# Patient Record
Sex: MALE | Race: WHITE | Employment: FULL TIME | URBAN - NONMETROPOLITAN AREA
[De-identification: names, ages, dates, MRNs, and addresses within clinical notes are randomized per-mention and may not be internally consistent; named-entity substitution may affect disease eponyms.]

---

## 2020-07-07 ENCOUNTER — OFFICE VISIT (OUTPATIENT)
Dept: INTERNAL MEDICINE CLINIC | Age: 30
End: 2020-07-07
Payer: MEDICAID

## 2020-07-07 VITALS
SYSTOLIC BLOOD PRESSURE: 128 MMHG | DIASTOLIC BLOOD PRESSURE: 69 MMHG | HEIGHT: 72 IN | BODY MASS INDEX: 21.81 KG/M2 | HEART RATE: 66 BPM | TEMPERATURE: 98.1 F | WEIGHT: 161 LBS

## 2020-07-07 LAB
AMPHETAMINE SCREEN, URINE: ABNORMAL
BARBITURATE SCREEN, URINE: ABNORMAL
BENZODIAZEPINE SCREEN, URINE: ABNORMAL
BUPRENORPHINE URINE: ABNORMAL
COCAINE METABOLITE SCREEN URINE: ABNORMAL
GABAPENTIN SCREEN, URINE: ABNORMAL
MDMA URINE: ABNORMAL
METHADONE SCREEN, URINE: ABNORMAL
METHAMPHETAMINE, URINE: ABNORMAL
OPIATE SCREEN URINE: ABNORMAL
OXYCODONE SCREEN URINE: ABNORMAL
PHENCYCLIDINE SCREEN URINE: ABNORMAL
PROPOXYPHENE SCREEN, URINE: ABNORMAL
THC SCREEN, URINE: ABNORMAL
TRICYCLIC ANTIDEPRESSANTS, UR: ABNORMAL

## 2020-07-07 PROCEDURE — 99204 OFFICE O/P NEW MOD 45 MIN: CPT | Performed by: INTERNAL MEDICINE

## 2020-07-07 PROCEDURE — G8420 CALC BMI NORM PARAMETERS: HCPCS | Performed by: INTERNAL MEDICINE

## 2020-07-07 PROCEDURE — 4004F PT TOBACCO SCREEN RCVD TLK: CPT | Performed by: INTERNAL MEDICINE

## 2020-07-07 PROCEDURE — 80305 DRUG TEST PRSMV DIR OPT OBS: CPT | Performed by: INTERNAL MEDICINE

## 2020-07-07 PROCEDURE — G8428 CUR MEDS NOT DOCUMENT: HCPCS | Performed by: INTERNAL MEDICINE

## 2020-07-07 NOTE — PROGRESS NOTES
MEDICATION ASSISTED TREATMENT ENCOUNTER    HISTORY OF PRESENT ILLNESS  Patient presents for evaluation of opioid use and would like to be placed on medication assisted treatment  She is referred here by his sister who comes here and sees me  Patient has had problems using heroin/fentanyl, pain pills  Patient started using [id number] years ago  He said his brother had  of a heroin overdose  Patient uses it snorting  Other drugs used: THC  Suboxone programs in the past no  Vivitrol in the past no  Last use of heroin 2 to 3 days ago  Patient would typically use $40-$50 worth of heroin/fentanyl daily  Ever used Suboxone off the street yes he has been using Suboxone off the street the past couple of days    No past medical history on file. No past surgical history on file. PAST PSYCHIATRIC HISTORY: Depression    No Known Allergies    Current Outpatient Medications   Medication Sig Dispense Refill    buprenorphine-naloxone (SUBOXONE) 12-3 MG sublingual film Place 1 Film under the tongue daily for 7 days. 7 Film 0     No current facility-administered medications for this visit.           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     Support system wife, sister, mom and dad     Legal issues Counts include 234 beds at the [facility] prison a handful of times     Tobacco: yes, cigarettes     Alcohol no                 ROS     General: Patient denies fevers, chills ,weight changes, sweats     Psych: No depression, anxiety, suicidal ideation or attempts     Endocrine: No thyroid issues,no neck pain, no galactorrhea, no weight changes     Pulmonary: No shortness of breath, orthopnea, PND     Cardiac: No chest pain,syncope, no history of cardiac issues     GI: nervous stomach     : No dysuria, an opioid antagonist that reverses respiratory depression caused by opioids. Pharmacy will give patient or family member [facility] and explain how to use in an emergency.   I reviewed the [place] Automated Rx Reporting System report     There does not appear to be any discrepancies or overprescribing of controlled substances    We gave him 112 mg Suboxone strip out of stock  I told him he has to wait at least 24 hours after last fentanyl use  Follow-up here tomorrow

## 2020-07-07 NOTE — PROGRESS NOTES
Verbal order per Dr. Eveline Schneider for urine drug screen. Positive for BUP BAR THC Fentanyl . Verified results with Ivana Gee RN. Dr. Eveline Schneider ordered Suboxone 12mg film qty 1 out of stock for patient. Verified dose with patient. Patient was sent home with Suboxone 12mg film qty 1 out of stock and will be seen back in the office 7/8/20.

## 2020-07-08 ENCOUNTER — OFFICE VISIT (OUTPATIENT)
Dept: INTERNAL MEDICINE CLINIC | Age: 30
End: 2020-07-08
Payer: MEDICAID

## 2020-07-08 VITALS
BODY MASS INDEX: 22.48 KG/M2 | HEART RATE: 66 BPM | HEIGHT: 72 IN | DIASTOLIC BLOOD PRESSURE: 74 MMHG | SYSTOLIC BLOOD PRESSURE: 128 MMHG | TEMPERATURE: 98.1 F | WEIGHT: 166 LBS

## 2020-07-08 PROCEDURE — 99214 OFFICE O/P EST MOD 30 MIN: CPT | Performed by: INTERNAL MEDICINE

## 2020-07-08 PROCEDURE — 4004F PT TOBACCO SCREEN RCVD TLK: CPT | Performed by: INTERNAL MEDICINE

## 2020-07-08 PROCEDURE — G8427 DOCREV CUR MEDS BY ELIG CLIN: HCPCS | Performed by: INTERNAL MEDICINE

## 2020-07-08 PROCEDURE — G8420 CALC BMI NORM PARAMETERS: HCPCS | Performed by: INTERNAL MEDICINE

## 2020-07-08 PROCEDURE — 80305 DRUG TEST PRSMV DIR OPT OBS: CPT | Performed by: INTERNAL MEDICINE

## 2020-07-08 RX ORDER — BUPRENORPHINE AND NALOXONE 12; 3 MG/1; MG/1
1 FILM, SOLUBLE BUCCAL; SUBLINGUAL DAILY
Qty: 2 FILM | Refills: 0 | Status: CANCELLED | OUTPATIENT
Start: 2020-07-08 | End: 2020-07-10

## 2020-07-08 RX ORDER — BUPRENORPHINE AND NALOXONE 12; 3 MG/1; MG/1
1 FILM, SOLUBLE BUCCAL; SUBLINGUAL DAILY
COMMUNITY
End: 2020-07-09 | Stop reason: SDUPTHER

## 2020-07-08 NOTE — PROGRESS NOTES
Clinician engaged with pt and reviewed OBOT clinic expectations for program participants. Clinician explained that upon starting with the program the patient will be expected to engage in individual and group counseling to enhance their recovery skills. Clinician reviewed the treatment menu with patient and identified local providers that they are comfortable seeing for counseling services. Provided pt with information about accessing services at their agency of choice. Clinician explained that upon completing their first counseling visit they need to bring a copy of a signed MICHELINE that allows the counselor to release attendance records to the clinic and a copy of their treatment plan. Clinician explained that they need to bring a signed form verifying their attendance after each session with their counselor so that it can be uploaded into their chart. It was explained to the pt that they need to attend either treatment groups at a local agency where they receive counseling or attend a minimum of two community support groups per week and would need to bring a signed verification form. Pt was provided with a list of local 12 step meetings and the attendance verification form. Clinician explained the drug screen policy and informed them that their provider may request that they come in for a random drug screen or medication count. In the event that they are called for a random check, they will have 24hrs to come into the office. In the event that pt is not compliant with program expectations the frequency of their visits may be increased for added accountability, they may be asked to participate in a higher level of care, or they may be terminated from the treatment program.     Clinician allowed time for pt questions and had them sign the program expectations form and clinician signed as the witness.  A copy was made for patient to present to the counselor that they schedule with so that the pt can get the necessary documentation for the clinic. The form was given to support staff to be scanned into the pt chart. Pt would prefer counseling options in the [facility] area and was provided with multiple providers locally that accept his insurance and encouraged to review the options and notify staff at next appointment which agency he has chosen to engage with. Clinician also provided pt with a comprehensive list of virtual and in-person community support groups.

## 2020-07-08 NOTE — PROGRESS NOTES
nocturia, urgency, frequency     MS: legs ache     Neuro: Patient denies headaches, seizures, tremors     Skin: No skin lesions, rashes    PHYSICAL EXAM   Blood pressure 128/74, pulse 66, temperature 98.1 °F (36.7 °C), height 6' (1.829 m), weight 166 lb (75.3 kg). General: Patient resting comfortably in no acute distress     Mental status: Alert and oriented to person place and time, no hallucinations or delusions     Eyes: Pupils are normal        Neurologic: No focal neurologic deficit detected, no tremors        Skin: No rashes, lesions or abnormalities noted        URINE DRUG SCREEN TODAY:  Recent Labs     07/09/20  1625   LABAMPH NEG   LABBARB NEG   LABBENZ NEG   BUPRENUR POS   COCAIMETSCRU NEG   GABAPENTIN N/A   MDMA NEG   METAMPU NEG   LABMETH NEG   OPIATESCREENURINE NEG   OXTCOSU NEG   PHENCYCLIDINESCREENURINE NEG   PROPOXYPHENE N/A   THCSCREENUR POS   TRICYUR N/A           Diagnosis Orders   1. Severe opioid use disorder (HCC)  POCT Rapid Drug Screen   2. Polysubstance abuse [facility])           PLAN:  Provider provided education on Medication Assisted Treatment options, including: Suboxone, Sublocade, Methadone, and Naltrexone/Vivitrol  Allowed opportunity to respond to questions regarding the treatment options. Patient voices that their treatment preference is suboxone. I feel that this patient is an appropriate candidate for this treatment option. Education given on the importance of combining Medication Assisted Treatment with comprehensive treatment, including: individual counseling, treatment groups, community support groups, and psychiatry as applicable. Patient will meet with  to review clinic counseling expectations and to be linked to appropriate services. Provider reviewed medication contract with patient. Patient is agreeable to the program expectations. Both patient and provider signed the medication contract.      Patient instructed to go to [facility] to watch a video and learn about [place]. I told patient [place] is an opioid antagonist that reverses respiratory depression caused by opioids. Pharmacy will give patient or family member [place] and explain how to use in an emergency.   I reviewed the [place] Automated Rx Reporting System report     There does not appear to be any discrepancies or overprescribing of controlled substances  I sent him home with 2- 12 mg strips Suboxone  Follow-up here tomorrow

## 2020-07-08 NOTE — PROGRESS NOTES
Verbal order per Dr. Olga Castillo for urine drug screen. Positive for BAR BUP THC. Verified results with Yani Naqvi RN. Dr. Olga Castillo ordered Suboxone 12mg film qty 2 out of stock for patient. Verified dose with patient. Patient was sent home with Suboxone 12mg film qty 2 out of stock and will be seen back in the office 7/9/20.

## 2020-07-09 ENCOUNTER — OFFICE VISIT (OUTPATIENT)
Dept: INTERNAL MEDICINE CLINIC | Age: 30
End: 2020-07-09
Payer: MEDICAID

## 2020-07-09 VITALS
BODY MASS INDEX: 22.08 KG/M2 | DIASTOLIC BLOOD PRESSURE: 71 MMHG | TEMPERATURE: 98.2 F | HEIGHT: 72 IN | SYSTOLIC BLOOD PRESSURE: 134 MMHG | HEART RATE: 63 BPM | WEIGHT: 163 LBS

## 2020-07-09 PROCEDURE — G8427 DOCREV CUR MEDS BY ELIG CLIN: HCPCS | Performed by: INTERNAL MEDICINE

## 2020-07-09 PROCEDURE — 80305 DRUG TEST PRSMV DIR OPT OBS: CPT | Performed by: INTERNAL MEDICINE

## 2020-07-09 PROCEDURE — G8420 CALC BMI NORM PARAMETERS: HCPCS | Performed by: INTERNAL MEDICINE

## 2020-07-09 PROCEDURE — 99213 OFFICE O/P EST LOW 20 MIN: CPT | Performed by: INTERNAL MEDICINE

## 2020-07-09 PROCEDURE — 4004F PT TOBACCO SCREEN RCVD TLK: CPT | Performed by: INTERNAL MEDICINE

## 2020-07-09 RX ORDER — BUPRENORPHINE AND NALOXONE 12; 3 MG/1; MG/1
1 FILM, SOLUBLE BUCCAL; SUBLINGUAL DAILY
Qty: 5 FILM | Refills: 0 | Status: SHIPPED | OUTPATIENT
Start: 2020-07-09 | End: 2020-07-14 | Stop reason: SDUPTHER

## 2020-07-09 NOTE — PROGRESS NOTES
MEDICATION ASSISTED TREATMENT ENCOUNTER    HISTORY OF PRESENT ILLNESS  Patient presents for evaluation of opioid use and would like to be placed on medication assisted treatment  he is referred here by his sister who comes here and sees me  I saw him here   Patient has had problems using heroin/fentanyl, pain pills  Patient started using [id number] years ago  He said his brother had  of a heroin overdose  Patient uses it snorting  Other drugs used: THC  Suboxone programs in the past no  Vivitrol in the past no  Last use of heroin 2 to 3 days ago  Patient would typically use $40-$50 worth of heroin/fentanyl daily  Ever used Suboxone off the street yes he has been using Suboxone off the street the past couple of days          ROS     General:   Feels much better on Suboxone no withdrawal symptoms    PHYSICAL EXAM   Blood pressure 134/71, pulse 63, temperature 98.2 °F (36.8 °C), height 6' (1.829 m), weight 163 lb (73.9 kg).          General: Patient resting comfortably in no acute distress     Mental status: Alert and oriented to person place and time, no hallucinations or delusions     Eyes: Pupils are normal           URINE DRUG SCREEN TODAY:  Amphetamine Screen, Urine  2020  4:25 PM  Unknown    NEG    Barbiturate Screen, Urine  2020  4:25 PM  Unknown    NEG    Benzodiazepine Screen, Urine  2020  4:25 PM  Unknown    NEG    Buprenorphine Urine  2020  4:25 PM  Unknown    POS    Cocaine Metabolite Screen, Urine  2020  4:25 PM  Unknown    NEG    Gabapentin Screen, Urine  2020  4:25 PM  Unknown    N/A    MDMA, Urine  2020  4:25 PM  Unknown    NEG    Methamphetamine, Urine  2020  4:25 PM  Unknown    NEG    Methadone Screen, Urine  2020  4:25 PM  Unknown    NEG    Opiate Scrn, Ur  2020  4:25 PM  Unknown    NEG    Oxycodone Screen, Ur  2020  4:25 PM  Unknown    NEG    PCP Screen, Urine

## 2020-07-14 ENCOUNTER — OFFICE VISIT (OUTPATIENT)
Dept: INTERNAL MEDICINE CLINIC | Age: 30
End: 2020-07-14
Payer: MEDICAID

## 2020-07-14 VITALS
TEMPERATURE: 97.7 F | BODY MASS INDEX: 22.48 KG/M2 | HEART RATE: 62 BPM | SYSTOLIC BLOOD PRESSURE: 129 MMHG | WEIGHT: 166 LBS | DIASTOLIC BLOOD PRESSURE: 80 MMHG | HEIGHT: 72 IN

## 2020-07-14 PROCEDURE — 80305 DRUG TEST PRSMV DIR OPT OBS: CPT | Performed by: INTERNAL MEDICINE

## 2020-07-14 PROCEDURE — 4004F PT TOBACCO SCREEN RCVD TLK: CPT | Performed by: INTERNAL MEDICINE

## 2020-07-14 PROCEDURE — G8420 CALC BMI NORM PARAMETERS: HCPCS | Performed by: INTERNAL MEDICINE

## 2020-07-14 PROCEDURE — G8427 DOCREV CUR MEDS BY ELIG CLIN: HCPCS | Performed by: INTERNAL MEDICINE

## 2020-07-14 PROCEDURE — 99213 OFFICE O/P EST LOW 20 MIN: CPT | Performed by: INTERNAL MEDICINE

## 2020-07-14 RX ORDER — BUPRENORPHINE AND NALOXONE 12; 3 MG/1; MG/1
1 FILM, SOLUBLE BUCCAL; SUBLINGUAL DAILY
Qty: 7 FILM | Refills: 0 | Status: SHIPPED | OUTPATIENT
Start: 2020-07-14 | End: 2020-07-21 | Stop reason: SDUPTHER

## 2020-07-14 NOTE — PROGRESS NOTES
Verbal order per Dr. Paola Morillo for urine drug screen. Positive for BUP THC. Verified results with Georges Peraza RN. Dr. Paola Morillo ordered Suboxone 12mg film daily for patient. Verified dose with patient. Patient was sent home with 1 week script of Suboxone 12mg film daily and will be seen back in the office 7/21/20.

## 2020-07-14 NOTE — PROGRESS NOTES
MEDICATION ASSISTED TREATMENT ENCOUNTER    HISTORY OF PRESENT ILLNESS  Patient presents for evaluation of opioid use and would like to be placed on medication assisted treatment  he is referred here by his sister who comes here and sees me  I saw him here   Patient has had problems using heroin/fentanyl, pain pills  Patient started using [id number] years ago  He said his brother had  of a heroin overdose  Patient uses it snorting  We started Suboxone last week  I gave him 12 mg films over the weekend  He said 1 day he had to use an extra half of a strip          ROS     General:   Feels much better on Suboxone no withdrawal symptoms    PHYSICAL EXAM   Blood pressure 129/80, pulse 62, temperature 97.7 °F (36.5 °C), height 6' (1.829 m), weight 166 lb (75.3 kg).          General: Patient resting comfortably in no acute distress     Mental status: Alert and oriented to person place and time, no hallucinations or delusions     Eyes: Pupils are normal           URINE DRUG SCREEN TODAY:  Amphetamine Screen, Urine  2020 10:15 AM  Unknown    NEG    Barbiturate Screen, Urine  2020 10:15 AM  Unknown    NEG    Benzodiazepine Screen, Urine  2020 10:15 AM  Unknown    NEG    Buprenorphine Urine  2020 10:15 AM  Unknown    POS    Cocaine Metabolite Screen, Urine  2020 10:15 AM  Unknown    NEG    Gabapentin Screen, Urine  2020 10:15 AM  Unknown    N/A    MDMA, Urine  2020 10:15 AM  Unknown    NEG    Methamphetamine, Urine  2020 10:15 AM  Unknown    NEG    Methadone Screen, Urine  2020 10:15 AM  Unknown    NEG    Opiate Scrn, Ur  2020 10:15 AM  Unknown    NEG    Oxycodone Screen, Ur  2020 10:15 AM  Unknown    NEG    PCP Screen, Urine  2020 10:15 AM  Unknown    NEG    Propoxyphene Screen, Urine  2020 10:15 AM  Unknown    N/A    THC Screen, Urine  2020 10:15 AM  Unknown    POS Tricyclic Antidepressants, Urine  07/14/2020 10:15 AM  Unknown    N/A         Diagnosis Orders   1. Severe opioid use disorder (HCC)  POCT Rapid Drug Screen    buprenorphine-naloxone (SUBOXONE) 12-3 MG sublingual film    Hepatitis C Antibody   2. Polysubstance abuse ([facility] [address]     3.  Encounter for monitoring Suboxone maintenance therapy           PLAN:  Patient says he is doing well on this dose  He is having no withdrawal no cravings no side effects from the Suboxone plan will be to continue him on 12 mg films daily  I am going to see him back early next week Gloria Johnston is working on setting up counseling for the patient

## 2020-07-21 ENCOUNTER — OFFICE VISIT (OUTPATIENT)
Dept: INTERNAL MEDICINE CLINIC | Age: 30
End: 2020-07-21
Payer: MEDICAID

## 2020-07-21 VITALS
HEIGHT: 72 IN | WEIGHT: 169.2 LBS | DIASTOLIC BLOOD PRESSURE: 60 MMHG | TEMPERATURE: 98.6 F | BODY MASS INDEX: 22.92 KG/M2 | SYSTOLIC BLOOD PRESSURE: 129 MMHG | HEART RATE: 65 BPM

## 2020-07-21 PROCEDURE — G8427 DOCREV CUR MEDS BY ELIG CLIN: HCPCS | Performed by: INTERNAL MEDICINE

## 2020-07-21 PROCEDURE — G8420 CALC BMI NORM PARAMETERS: HCPCS | Performed by: INTERNAL MEDICINE

## 2020-07-21 PROCEDURE — 80305 DRUG TEST PRSMV DIR OPT OBS: CPT | Performed by: INTERNAL MEDICINE

## 2020-07-21 PROCEDURE — 4004F PT TOBACCO SCREEN RCVD TLK: CPT | Performed by: INTERNAL MEDICINE

## 2020-07-21 PROCEDURE — 99213 OFFICE O/P EST LOW 20 MIN: CPT | Performed by: INTERNAL MEDICINE

## 2020-07-21 RX ORDER — BUPRENORPHINE AND NALOXONE 12; 3 MG/1; MG/1
1 FILM, SOLUBLE BUCCAL; SUBLINGUAL DAILY
Qty: 7 FILM | Refills: 0 | Status: SHIPPED | OUTPATIENT
Start: 2020-07-21 | End: 2020-07-28 | Stop reason: SDUPTHER

## 2020-07-21 ASSESSMENT — PATIENT HEALTH QUESTIONNAIRE - PHQ9
2. FEELING DOWN, DEPRESSED OR HOPELESS: 0
SUM OF ALL RESPONSES TO PHQ9 QUESTIONS 1 & 2: 0
SUM OF ALL RESPONSES TO PHQ QUESTIONS 1-9: 0
1. LITTLE INTEREST OR PLEASURE IN DOING THINGS: 0
SUM OF ALL RESPONSES TO PHQ QUESTIONS 1-9: 0

## 2020-07-21 NOTE — PROGRESS NOTES
Verbal order per Dr. Silvana Irizarry for urine drug screen. Positive for THC,BUP. Verified results with Tatiana Paulino.

## 2020-07-21 NOTE — PROGRESS NOTES
MEDICATION ASSISTED TREATMENT ENCOUNTER    HISTORY OF PRESENT ILLNESS  Patient presents for evaluation of opioid use and would like to be placed on medication assisted treatment  he is referred here by his sister who comes here and sees me  I saw him here   Patient has had problems using heroin/fentanyl, pain pills  Patient started using [id number] years ago  He said his brother had  of a heroin overdose  Patient uses it snorting  We started Suboxone last week  I gave him 12 mg films over the weekend  He said 1 day he had to use an extra half of a strip          ROS     General:   Feels much better on Suboxone no withdrawal symptoms  No urges or cravings  PHYSICAL EXAM   Blood pressure 129/60, pulse 65, temperature 98.6 °F (37 °C), temperature source Temporal, height 6' (1.829 m), weight 169 lb 3.2 oz (76.7 kg).          General: Patient resting comfortably in no acute distress     Mental status: Alert and oriented to person place and time, no hallucinations or delusions     Eyes: Pupils are normal           URINE DRUG SCREEN TODAY:  Amphetamine Screen, Urine  2020  1:34 PM  Unknown    NEG    Barbiturate Screen, Urine  2020  1:34 PM  Unknown    NEG    Benzodiazepine Screen, Urine  2020  1:34 PM  Unknown    NEG    Buprenorphine Urine  2020  1:34 PM  Unknown    POS    Cocaine Metabolite Screen, Urine  2020  1:34 PM  Unknown    NEG    Gabapentin Screen, Urine  2020  1:34 PM  Unknown    N/A    MDMA, Urine  2020  1:34 PM  Unknown    NEG    Methamphetamine, Urine  2020  1:34 PM  Unknown    NEG    Methadone Screen, Urine  2020  1:34 PM  Unknown    NEG    Opiate Scrn, Ur  2020  1:34 PM  Unknown    NEG    Oxycodone Screen, Ur  2020  1:34 PM  Unknown    NEG    PCP Screen, Urine  2020  1:34 PM  Unknown    NEG    Propoxyphene Screen, Urine  2020  1:34 PM  Unknown    N/A    THC Screen, Urine  07/21/2020  1:34 PM  Unknown    POS    Tricyclic Antidepressants, Urine  07/21/2020  1:34 PM  Unknown    N/A    Narrative     Negative for Fentanyl         Diagnosis Orders   1. Severe opioid use disorder (HCC)  POCT Rapid Drug Screen    buprenorphine-naloxone (SUBOXONE) 12-3 MG sublingual film   2. Polysubstance abuse ([facility] [address]     3.  Encounter for monitoring Suboxone maintenance therapy           PLAN:  Patient says he is doing well on this dose  He is having no withdrawal no cravings no side effects from the Suboxone plan will be to continue him on 12 mg films daily  We will have the  see him here today to help him set up meetings and counseling

## 2020-07-28 ENCOUNTER — VIRTUAL VISIT (OUTPATIENT)
Dept: INTERNAL MEDICINE CLINIC | Age: 30
End: 2020-07-28
Payer: MEDICAID

## 2020-07-28 PROCEDURE — G8420 CALC BMI NORM PARAMETERS: HCPCS | Performed by: INTERNAL MEDICINE

## 2020-07-28 PROCEDURE — G8428 CUR MEDS NOT DOCUMENT: HCPCS | Performed by: INTERNAL MEDICINE

## 2020-07-28 PROCEDURE — 4004F PT TOBACCO SCREEN RCVD TLK: CPT | Performed by: INTERNAL MEDICINE

## 2020-07-28 PROCEDURE — 99213 OFFICE O/P EST LOW 20 MIN: CPT | Performed by: INTERNAL MEDICINE

## 2020-07-28 RX ORDER — BUPRENORPHINE AND NALOXONE 12; 3 MG/1; MG/1
1 FILM, SOLUBLE BUCCAL; SUBLINGUAL DAILY
Qty: 7 FILM | Refills: 0 | Status: SHIPPED | OUTPATIENT
Start: 2020-07-28 | End: 2020-08-04 | Stop reason: SDUPTHER

## 2020-07-28 NOTE — PROGRESS NOTES
MEDICATION ASSISTED TREATMENT ENCOUNTER  We changed the patient to a video visit given the COVID threat  HISTORY OF PRESENT ILLNESS  Patient presents for evaluation of opioid use and would like to be placed on medication assisted treatment  he is referred here by his sister who comes here and sees me  I saw him here   Patient has had problems using heroin/fentanyl, pain pills  Patient started using [id number] years ago  He said his brother had  of a heroin overdose  Patient uses it snorting  We started Suboxone last week  I gave him 12 mg films over the weekend  He said 1 day he had to use an extra half of a strip          ROS     General:   Feels much better on Suboxone no withdrawal symptoms  No urges or cravings  PHYSICAL EXAM   There were no vitals taken for this visit. General: Patient resting comfortably in no acute distress     Mental status: Alert and oriented to person place and time, no hallucinations or delusions     Eyes: Pupils are normal           URINE DRUG SCREEN TODAY:  Not done   Diagnosis Orders   1. Severe opioid use disorder (HCC)  buprenorphine-naloxone (SUBOXONE) 12-3 MG sublingual film   2. Polysubstance abuse ([facility] [address]     3.  Encounter for monitoring Suboxone maintenance therapy           PLAN:  Patient says he is doing well on this dose  He is having no withdrawal no cravings no side effects from the Suboxone plan will be to continue him on 12 mg films daily  Follow-up 1 week

## 2020-08-04 ENCOUNTER — VIRTUAL VISIT (OUTPATIENT)
Dept: INTERNAL MEDICINE CLINIC | Age: 30
End: 2020-08-04
Payer: MEDICAID

## 2020-08-04 PROCEDURE — G8420 CALC BMI NORM PARAMETERS: HCPCS | Performed by: INTERNAL MEDICINE

## 2020-08-04 PROCEDURE — 4004F PT TOBACCO SCREEN RCVD TLK: CPT | Performed by: INTERNAL MEDICINE

## 2020-08-04 PROCEDURE — 99213 OFFICE O/P EST LOW 20 MIN: CPT | Performed by: INTERNAL MEDICINE

## 2020-08-04 PROCEDURE — G8428 CUR MEDS NOT DOCUMENT: HCPCS | Performed by: INTERNAL MEDICINE

## 2020-08-04 RX ORDER — BUPRENORPHINE AND NALOXONE 12; 3 MG/1; MG/1
1 FILM, SOLUBLE BUCCAL; SUBLINGUAL DAILY
Qty: 7 FILM | Refills: 0 | Status: SHIPPED | OUTPATIENT
Start: 2020-08-04 | End: 2020-08-11 | Stop reason: SDUPTHER

## 2020-08-04 NOTE — PROGRESS NOTES
2020    TELEHEALTH EVALUATION -- Audio/Visual (During EPVAB-73 public health emergency)    HPI:  A video conference was done with the patient  Patient was at home, I was in the office  There was no  one else  present during the conference    Chilango Juárez (:  1990) has requested an audio/video evaluation for the following concern(s):    HPI  I saw him here   Patient has had problems using heroin/fentanyl, pain pills  Patient started using [id number] years ago  He said his brother had  of a heroin overdose  Patient uses it snorting  We started Suboxone last week  I gave him 12 mg films over the weekend  He said 1 day he had to use an extra half of a strip  ROS-Patient is feeling well  Patient is not experiencing  withdrawal symptoms ,no urges or cravings  Patient is not having any side effects from the buprenorphine      Prior to Visit Medications    Medication Sig Taking? Authorizing Provider   buprenorphine-naloxone (SUBOXONE) 12-3 MG sublingual film Place 1 Film under the tongue daily for 7 days.  Yes Sapphire García MD       Social History     Tobacco Use    Smoking status: Current Every Day Smoker     Packs/day: 1.00    Smokeless tobacco: Never Used   Substance Use Topics    Alcohol use: Not Currently     Comment: social/occasional    Drug use: No     Comment: last used fentanyl 20            PHYSICAL EXAMINATION:  [ INSTRUCTIONS:  \"[x]\" Indicates a positive item  \"[]\" Indicates a negative item  -- DELETE ALL ITEMS NOT EXAMINED]  No vitals done    Constitutional: [x] Appears well-developed and well-nourished [x] No apparent distress      [] Abnormal-   Mental status  [x] Alert and awake  [x] Oriented to person/place/time [x]Able to follow commands      Eyes:  EOM    [x]  Normal  [] Abnormal-  Sclera  []  Normal  [] Abnormal -         Discharge []  None visible  [] Abnormal -  Pupils normal           Psychiatric:       [x] Normal Affect [] No Hallucinations        [] Abnormal- Diagnosis Orders   1. Severe opioid use disorder (HCC)  buprenorphine-naloxone (SUBOXONE) 12-3 MG sublingual film   2. Polysubstance abuse ([address]     3. Encounter for monitoring Suboxone maintenance therapy           Galen Mendoza is a 27 y.o. male being evaluated by a Virtual Visit (video visit) encounter to address concerns as mentioned above. A caregiver was present when appropriate. Due to this being a TeleHealth encounter (During [facility] public health emergency), evaluation of the following organ systems was limited: Vitals/Constitutional/EENT/Resp/CV/GI//MS/Neuro/Skin/Heme-Lymph-Imm. Pursuant to the emergency declaration under the [address] [place] [address] authority and the [employer] Act, this Virtual Visit was conducted with patient's (and/or legal guardian's) consent, to reduce the patient's risk of exposure to COVID-19 and provide necessary medical care. The patient (and/or legal guardian) has also been advised to contact this office for worsening conditions or problems, and seek emergency medical treatment and/or call 911 if deemed necessary. Services were provided through a video synchronous discussion virtually to substitute for in-person clinic visit. Follow-up here in the office 1 week  --Scooter Cunha MD on 8/4/2020 at 4:29 PM    An electronic signature was used to authenticate this note.

## 2020-08-11 ENCOUNTER — NURSE ONLY (OUTPATIENT)
Dept: INTERNAL MEDICINE CLINIC | Age: 30
End: 2020-08-11
Payer: MEDICAID

## 2020-08-11 VITALS
HEART RATE: 60 BPM | SYSTOLIC BLOOD PRESSURE: 120 MMHG | TEMPERATURE: 97.2 F | HEIGHT: 72 IN | BODY MASS INDEX: 23.57 KG/M2 | WEIGHT: 174 LBS | DIASTOLIC BLOOD PRESSURE: 62 MMHG

## 2020-08-11 PROCEDURE — 80305 DRUG TEST PRSMV DIR OPT OBS: CPT | Performed by: INTERNAL MEDICINE

## 2020-08-11 PROCEDURE — 99211 OFF/OP EST MAY X REQ PHY/QHP: CPT | Performed by: NURSE PRACTITIONER

## 2020-08-11 RX ORDER — BUPRENORPHINE AND NALOXONE 12; 3 MG/1; MG/1
1 FILM, SOLUBLE BUCCAL; SUBLINGUAL DAILY
Qty: 7 FILM | Refills: 0 | OUTPATIENT
Start: 2020-08-11 | End: 2020-08-18 | Stop reason: SDUPTHER

## 2020-08-11 NOTE — PROGRESS NOTES
Verbal order per Madelaine Clay CNP for urine drug screen. Positive for BUP THC. Altagracia NELSON ordered Suboxone 12mg film daily for 7 days. LPN called in script of Suboxone 12mg film daily for 7 days qty 7 into [facility] in [place], [place].

## 2020-08-18 ENCOUNTER — NURSE ONLY (OUTPATIENT)
Dept: LAB | Age: 30
End: 2020-08-18

## 2020-08-18 ENCOUNTER — OFFICE VISIT (OUTPATIENT)
Dept: INTERNAL MEDICINE CLINIC | Age: 30
End: 2020-08-18
Payer: MEDICAID

## 2020-08-18 VITALS
HEART RATE: 55 BPM | BODY MASS INDEX: 23.57 KG/M2 | HEIGHT: 72 IN | TEMPERATURE: 97.2 F | WEIGHT: 174 LBS | DIASTOLIC BLOOD PRESSURE: 63 MMHG | SYSTOLIC BLOOD PRESSURE: 110 MMHG

## 2020-08-18 LAB
AMPHETAMINE SCREEN, URINE: ABNORMAL
BARBITURATE SCREEN, URINE: ABNORMAL
BENZODIAZEPINE SCREEN, URINE: ABNORMAL
BUPRENORPHINE URINE: ABNORMAL
COCAINE METABOLITE SCREEN URINE: ABNORMAL
GABAPENTIN SCREEN, URINE: ABNORMAL
HEPATITIS C ANTIBODY: NEGATIVE
MDMA URINE: ABNORMAL
METHADONE SCREEN, URINE: ABNORMAL
METHAMPHETAMINE, URINE: ABNORMAL
OPIATE SCREEN URINE: ABNORMAL
OXYCODONE SCREEN URINE: ABNORMAL
PHENCYCLIDINE SCREEN URINE: ABNORMAL
PROPOXYPHENE SCREEN, URINE: ABNORMAL
THC SCREEN, URINE: ABNORMAL
TRICYCLIC ANTIDEPRESSANTS, UR: ABNORMAL

## 2020-08-18 PROCEDURE — 80305 DRUG TEST PRSMV DIR OPT OBS: CPT | Performed by: INTERNAL MEDICINE

## 2020-08-18 PROCEDURE — 99213 OFFICE O/P EST LOW 20 MIN: CPT | Performed by: INTERNAL MEDICINE

## 2020-08-18 PROCEDURE — G8427 DOCREV CUR MEDS BY ELIG CLIN: HCPCS | Performed by: INTERNAL MEDICINE

## 2020-08-18 PROCEDURE — 4004F PT TOBACCO SCREEN RCVD TLK: CPT | Performed by: INTERNAL MEDICINE

## 2020-08-18 PROCEDURE — G8420 CALC BMI NORM PARAMETERS: HCPCS | Performed by: INTERNAL MEDICINE

## 2020-08-18 RX ORDER — BUPRENORPHINE AND NALOXONE 12; 3 MG/1; MG/1
1 FILM, SOLUBLE BUCCAL; SUBLINGUAL DAILY
Qty: 7 FILM | Refills: 0 | Status: SHIPPED | OUTPATIENT
Start: 2020-08-18 | End: 2020-08-25 | Stop reason: SDUPTHER

## 2020-08-18 NOTE — PROGRESS NOTES
MEDICATION ASSISTED TREATMENT ENCOUNTER    HISTORY OF PRESENT ILLNESS  Patient presents for evaluation of opioid use and would like to be placed on medication assisted treatment  he is referred here by his sister who comes here and sees me  I saw him here   We had a video visit  and in the nurse visit a week later  Patient has had problems using heroin/fentanyl, pain pills  Patient started using [id number] years ago  He said his brother had  of a heroin overdose  Patient uses it snorting  We started Suboxone last week  I gave him 12 mg films over the weekend  He said 1 day he had to use an extra half of a strip          ROS     General:   Feels much better on Suboxone no withdrawal symptoms  No urges or cravings  PHYSICAL EXAM   Blood pressure 110/63, pulse 55, temperature 97.2 °F (36.2 °C), height 6' (1.829 m), weight 174 lb (78.9 kg).          General: Patient resting comfortably in no acute distress     Mental status: Alert and oriented to person place and time, no hallucinations or delusions     Eyes: Pupils are normal           URINE DRUG SCREEN TODAY:  Amphetamine Screen, Urine  2020  8:10 AM  Unknown    NEG    Barbiturate Screen, Urine  2020  8:10 AM  Unknown    NEG    Benzodiazepine Screen, Urine  2020  8:10 AM  Unknown    NEG    Buprenorphine Urine  2020  8:10 AM  Unknown    POS    Cocaine Metabolite Screen, Urine  2020  8:10 AM  Unknown    NEG    Gabapentin Screen, Urine  2020  8:10 AM  Unknown    N/A    MDMA, Urine  2020  8:10 AM  Unknown    NEG    Methamphetamine, Urine  2020  8:10 AM  Unknown    NEG    Methadone Screen, Urine  2020  8:10 AM  Unknown    NEG    Opiate Scrn, Ur  2020  8:10 AM  Unknown    NEG    Oxycodone Screen, Ur  2020  8:10 AM  Unknown    NEG    PCP Screen, Urine  2020  8:10 AM  Unknown    NEG    Propoxyphene Screen, Urine  2020  8:10 AM  Unknown    N/A    THC Screen, Urine  08/18/2020  8:10 AM  Unknown    POS    Tricyclic Antidepressants, Urine  08/18/2020  8:10 AM  Unknown    N/A    Narrative     NEGATIVE FOR FENTANYL. Diagnosis Orders   1. Severe opioid use disorder (HCC)  POCT Rapid Drug Screen    buprenorphine-naloxone (SUBOXONE) 12-3 MG sublingual film    Hepatitis C Antibody   2. Polysubstance abuse ([facility] [address]     3.  Encounter for monitoring Suboxone maintenance therapy           PLAN:  Patient says he is doing well on this dose  He is having no withdrawal no cravings no side effects from the Suboxone plan will be to continue him on 12 mg films daily  He is working on setting up counseling  Check hepatitis C antibody  Follow-up 1 week

## 2020-08-18 NOTE — PROGRESS NOTES
Verbal order per Dr. Fox Ruelas for urine drug screen. Positive for BUP THC . Verified results with Monika Oswald RN. Dr. Fox Ruelas ordered Suboxone 12mg film daily  for patient. Verified dose with patient. Patient was sent home with 1 week script of Suboxone 12mg film daily and will be seen back in the office 8/25/20. Ordered Hep C antibody.

## 2020-08-25 ENCOUNTER — OFFICE VISIT (OUTPATIENT)
Dept: INTERNAL MEDICINE CLINIC | Age: 30
End: 2020-08-25
Payer: MEDICAID

## 2020-08-25 VITALS
DIASTOLIC BLOOD PRESSURE: 60 MMHG | HEART RATE: 62 BPM | WEIGHT: 174 LBS | TEMPERATURE: 97.7 F | HEIGHT: 72 IN | SYSTOLIC BLOOD PRESSURE: 115 MMHG | BODY MASS INDEX: 23.57 KG/M2

## 2020-08-25 PROCEDURE — 4004F PT TOBACCO SCREEN RCVD TLK: CPT | Performed by: INTERNAL MEDICINE

## 2020-08-25 PROCEDURE — G8420 CALC BMI NORM PARAMETERS: HCPCS | Performed by: INTERNAL MEDICINE

## 2020-08-25 PROCEDURE — 80305 DRUG TEST PRSMV DIR OPT OBS: CPT | Performed by: INTERNAL MEDICINE

## 2020-08-25 PROCEDURE — 99213 OFFICE O/P EST LOW 20 MIN: CPT | Performed by: INTERNAL MEDICINE

## 2020-08-25 PROCEDURE — G8428 CUR MEDS NOT DOCUMENT: HCPCS | Performed by: INTERNAL MEDICINE

## 2020-08-25 RX ORDER — BUPRENORPHINE AND NALOXONE 12; 3 MG/1; MG/1
1 FILM, SOLUBLE BUCCAL; SUBLINGUAL DAILY
Qty: 14 FILM | Refills: 0 | Status: SHIPPED | OUTPATIENT
Start: 2020-08-25 | End: 2020-09-08 | Stop reason: SDUPTHER

## 2020-08-25 NOTE — PROGRESS NOTES
MEDICATION ASSISTED TREATMENT ENCOUNTER    HISTORY OF PRESENT ILLNESS  Patient presents for evaluation of opioid use and would like to be placed on medication assisted treatment  he is referred here by his sister who comes here and sees me  I saw him here   We had a video visit  and in the nurse visit a week later  Patient has had problems using heroin/fentanyl, pain pills  Patient started using [id number] years ago  He said his brother had  of a heroin overdose  Patient uses it snorting  We started Suboxone last week  I gave him 12 mg films over the weekend  He said 1 day he had to use an extra half of a strip          ROS     General:   Feels much better on Suboxone no withdrawal symptoms  No urges or cravings  PHYSICAL EXAM   Blood pressure 115/60, pulse 62, temperature 97.7 °F (36.5 °C), height 6' (1.829 m), weight 174 lb (78.9 kg).          General: Patient resting comfortably in no acute distress     Mental status: Alert and oriented to person place and time, no hallucinations or delusions     Eyes: Pupils are normal           URINE DRUG SCREEN TODAY:  Amphetamine Screen, Urine  2020  8:26 AM  Unknown    NEG    Barbiturate Screen, Urine  2020  8:26 AM  Unknown    NEG    Benzodiazepine Screen, Urine  2020  8:26 AM  Unknown    NEG    Buprenorphine Urine  2020  8:26 AM  Unknown    POS    Cocaine Metabolite Screen, Urine  2020  8:26 AM  Unknown    NEG    Gabapentin Screen, Urine  2020  8:26 AM  Unknown    N/A    MDMA, Urine  2020  8:26 AM  Unknown    NEG    Methamphetamine, Urine  2020  8:26 AM  Unknown    NEG    Methadone Screen, Urine  2020  8:26 AM  Unknown    NEG    Opiate Scrn, Ur  2020  8:26 AM  Unknown    NEG    Oxycodone Screen, Ur  2020  8:26 AM  Unknown    NEG    PCP Screen, Urine  2020  8:26 AM  Unknown    NEG    Propoxyphene Screen, Urine  2020  8:26 AM  Unknown    N/A    THC Screen, Urine  08/25/2020  8:26 AM  Unknown    POS    Tricyclic Antidepressants, Urine  08/25/2020  8:26 AM  Unknown    N/A    Narrative     NEGATIVE FOR FENTANYL. Diagnosis Orders   1. Severe opioid use disorder (HCC)  POCT Rapid Drug Screen    buprenorphine-naloxone (SUBOXONE) 12-3 MG sublingual film   2. Polysubstance abuse ([facility] [address]     3.  Encounter for monitoring Suboxone maintenance therapy           PLAN:  Patient says he is doing well on this dose  He is having no withdrawal no cravings no side effects from the Suboxone plan will be to continue him on 12 mg films daily  He is working on setting up counseling  Check hepatitis C antibody  Follow-up 2 weeks

## 2020-09-08 ENCOUNTER — OFFICE VISIT (OUTPATIENT)
Dept: INTERNAL MEDICINE CLINIC | Age: 30
End: 2020-09-08
Payer: MEDICAID

## 2020-09-08 VITALS
HEART RATE: 65 BPM | TEMPERATURE: 98.3 F | DIASTOLIC BLOOD PRESSURE: 60 MMHG | BODY MASS INDEX: 23.43 KG/M2 | SYSTOLIC BLOOD PRESSURE: 129 MMHG | HEIGHT: 72 IN | WEIGHT: 173 LBS

## 2020-09-08 PROCEDURE — 99213 OFFICE O/P EST LOW 20 MIN: CPT | Performed by: INTERNAL MEDICINE

## 2020-09-08 PROCEDURE — 80305 DRUG TEST PRSMV DIR OPT OBS: CPT | Performed by: INTERNAL MEDICINE

## 2020-09-08 PROCEDURE — G8420 CALC BMI NORM PARAMETERS: HCPCS | Performed by: INTERNAL MEDICINE

## 2020-09-08 PROCEDURE — 4004F PT TOBACCO SCREEN RCVD TLK: CPT | Performed by: INTERNAL MEDICINE

## 2020-09-08 PROCEDURE — G8427 DOCREV CUR MEDS BY ELIG CLIN: HCPCS | Performed by: INTERNAL MEDICINE

## 2020-09-08 RX ORDER — BUPRENORPHINE AND NALOXONE 12; 3 MG/1; MG/1
1 FILM, SOLUBLE BUCCAL; SUBLINGUAL DAILY
Qty: 14 FILM | Refills: 0 | Status: SHIPPED | OUTPATIENT
Start: 2020-09-08 | End: 2020-09-22 | Stop reason: SDUPTHER

## 2020-09-08 NOTE — PROGRESS NOTES
MEDICATION ASSISTED TREATMENT ENCOUNTER    HISTORY OF PRESENT ILLNESS  Patient presents for evaluation of opioid use and would like to be placed on medication assisted treatment  he is referred here by his sister who comes here and sees me  I saw him here   We had a video visit  and in the nurse visit a week later  Patient has had problems using heroin/fentanyl, pain pills  Patient started using [id number] years ago  He said his brother had  of a heroin overdose  Patient uses it snorting  We started Suboxone last week  I gave him 12 mg films over the weekend  He said 1 day he had to use an extra half of a strip    He says he has not used any illicit opioids      ROS     General:   Feels much better on Suboxone no withdrawal symptoms  No urges or cravings  PHYSICAL EXAM   Blood pressure 129/60, pulse 65, temperature 98.3 °F (36.8 °C), height 6' (1.829 m), weight 173 lb (78.5 kg).          General: Patient resting comfortably in no acute distress     Mental status: Alert and oriented to person place and time, no hallucinations or delusions     Eyes: Pupils are normal           URINE DRUG SCREEN TODAY:  Amphetamine Screen, Urine  2020  4:20 PM  Unknown    NEG    Barbiturate Screen, Urine  2020  4:20 PM  Unknown    NEG    Benzodiazepine Screen, Urine  2020  4:20 PM  Unknown    NEG    Buprenorphine Urine  2020  4:20 PM  Unknown    POS    Cocaine Metabolite Screen, Urine  2020  4:20 PM  Unknown    NEG    Gabapentin Screen, Urine  2020  4:20 PM  Unknown    N/A    MDMA, Urine  2020  4:20 PM  Unknown    NEG    Methamphetamine, Urine  2020  4:20 PM  Unknown    NEG    Methadone Screen, Urine  2020  4:20 PM  Unknown    NEG    Opiate Scrn, Ur  2020  4:20 PM  Unknown    NEG    Oxycodone Screen, Ur  2020  4:20 PM  Unknown    NEG    PCP Screen, Urine  2020  4:20 PM  Unknown    NEG Propoxyphene Screen, Urine  09/08/2020  4:20 PM  Unknown    N/A    THC Screen, Urine  09/08/2020  4:20 PM  Unknown    POS    Tricyclic Antidepressants, Urine  09/08/2020  4:20 PM  Unknown    N/A    Narrative     NEGATIVE FOR FENTANYL. Diagnosis Orders   1. Severe opioid use disorder (HCC)  POCT Rapid Drug Screen    buprenorphine-naloxone (SUBOXONE) 12-3 MG sublingual film   2. Polysubstance abuse ([facility] [address]     3.  Encounter for monitoring Suboxone maintenance therapy           PLAN:  Patient says he is doing well on this dose  He is having no withdrawal no cravings no side effects from the Suboxone plan will be to continue him on 12 mg films daily  He is working on setting up counseling  Hep C negative  Follow-up 2 weeks

## 2020-09-08 NOTE — PROGRESS NOTES
Verbal order per Dr. Mariela Soni for urine drug screen. Positive for BUP THC . Verified results with Tino Burkitt RN. Dr. Mariela Soni ordered Suboxone 12mg film daily for patient. Verified dose with patient. Patient was sent home with 2 week script of Suboxone 12mg film daily and will be seen back in the office 9/22/20.

## 2020-09-22 ENCOUNTER — OFFICE VISIT (OUTPATIENT)
Dept: INTERNAL MEDICINE CLINIC | Age: 30
End: 2020-09-22
Payer: MEDICAID

## 2020-09-22 VITALS
TEMPERATURE: 98.1 F | SYSTOLIC BLOOD PRESSURE: 109 MMHG | DIASTOLIC BLOOD PRESSURE: 64 MMHG | WEIGHT: 172 LBS | BODY MASS INDEX: 23.3 KG/M2 | HEIGHT: 72 IN | HEART RATE: 72 BPM

## 2020-09-22 PROCEDURE — 99213 OFFICE O/P EST LOW 20 MIN: CPT | Performed by: INTERNAL MEDICINE

## 2020-09-22 PROCEDURE — G8427 DOCREV CUR MEDS BY ELIG CLIN: HCPCS | Performed by: INTERNAL MEDICINE

## 2020-09-22 PROCEDURE — 80305 DRUG TEST PRSMV DIR OPT OBS: CPT | Performed by: INTERNAL MEDICINE

## 2020-09-22 PROCEDURE — 4004F PT TOBACCO SCREEN RCVD TLK: CPT | Performed by: INTERNAL MEDICINE

## 2020-09-22 PROCEDURE — G8420 CALC BMI NORM PARAMETERS: HCPCS | Performed by: INTERNAL MEDICINE

## 2020-09-22 RX ORDER — BUPRENORPHINE AND NALOXONE 12; 3 MG/1; MG/1
1 FILM, SOLUBLE BUCCAL; SUBLINGUAL DAILY
Qty: 14 FILM | Refills: 0 | Status: SHIPPED | OUTPATIENT
Start: 2020-09-22 | End: 2020-10-06 | Stop reason: SDUPTHER

## 2020-09-22 NOTE — PROGRESS NOTES
MEDICATION ASSISTED TREATMENT ENCOUNTER    HISTORY OF PRESENT ILLNESS  Patient presents for evaluation of opioid use and would like to be placed on medication assisted treatment  he is referred here by his sister who comes here and sees me  I saw him here   We had a video visit  and in the nurse visit a week later  Patient has had problems using heroin/fentanyl, pain pills  Patient started using [id number] years ago  He said his brother had  of a heroin overdose  Patient uses it snorting  We started Suboxone last week  I gave him 12 mg films over the weekend  He said 1 day he had to use an extra half of a strip    He says he has not used any illicit opioids      ROS     General:   Feels much better on Suboxone no withdrawal symptoms  No urges or cravings  PHYSICAL EXAM   Blood pressure 109/64, pulse 72, temperature 98.1 °F (36.7 °C), height 6' (1.829 m), weight 172 lb (78 kg).          General: Patient resting comfortably in no acute distress     Mental status: Alert and oriented to person place and time, no hallucinations or delusions     Eyes: Pupils are normal           URINE DRUG SCREEN TODAY:  Amphetamine Screen, Urine  2020  4:25 PM  Unknown    NEG    Barbiturate Screen, Urine  2020  4:25 PM  Unknown    NEG    Benzodiazepine Screen, Urine  2020  4:25 PM  Unknown    NEG    Buprenorphine Urine  2020  4:25 PM  Unknown    POS    Cocaine Metabolite Screen, Urine  2020  4:25 PM  Unknown    NEG    Gabapentin Screen, Urine  2020  4:25 PM  Unknown    N/A    MDMA, Urine  2020  4:25 PM  Unknown    NEG    Methamphetamine, Urine  2020  4:25 PM  Unknown    NEG    Methadone Screen, Urine  2020  4:25 PM  Unknown    NEG    Opiate Scrn, Ur  2020  4:25 PM  Unknown    NEG    Oxycodone Screen, Ur  2020  4:25 PM  Unknown    NEG    PCP Screen, Urine  2020  4:25 PM  Unknown    NEG

## 2020-09-22 NOTE — PROGRESS NOTES
Verbal order per Dr. Shonna Valle for urine drug screen. Positive for BUP THC. Verified results with Lelia Metz RN. Dr. Shonna Valle ordered Suboxone 12mg film daily for patient. Verified dose with patient. Patient was sent home with 2 week script of Suboxone 12mg film daily and will be seen back in the office 10/6/20.

## 2020-10-06 ENCOUNTER — OFFICE VISIT (OUTPATIENT)
Dept: INTERNAL MEDICINE CLINIC | Age: 30
End: 2020-10-06
Payer: MEDICAID

## 2020-10-06 VITALS
DIASTOLIC BLOOD PRESSURE: 61 MMHG | BODY MASS INDEX: 23.16 KG/M2 | TEMPERATURE: 97.8 F | HEART RATE: 59 BPM | WEIGHT: 171 LBS | SYSTOLIC BLOOD PRESSURE: 116 MMHG | HEIGHT: 72 IN

## 2020-10-06 LAB
ALCOHOL URINE: ABNORMAL
AMPHETAMINE SCREEN, URINE: ABNORMAL
BARBITURATE SCREEN, URINE: ABNORMAL
BENZODIAZEPINE SCREEN, URINE: ABNORMAL
BUPRENORPHINE URINE: ABNORMAL
COCAINE METABOLITE SCREEN URINE: ABNORMAL
FENTANYL SCREEN, URINE: ABNORMAL
GABAPENTIN SCREEN, URINE: ABNORMAL
MDMA URINE: ABNORMAL
METHADONE SCREEN, URINE: ABNORMAL
METHAMPHETAMINE, URINE: ABNORMAL
OPIATE SCREEN URINE: ABNORMAL
OXYCODONE SCREEN URINE: ABNORMAL
PHENCYCLIDINE SCREEN URINE: ABNORMAL
PROPOXYPHENE SCREEN, URINE: ABNORMAL
SYNTHETIC CANNABINOIDS(K2) SCREEN, URINE: ABNORMAL
THC SCREEN, URINE: ABNORMAL
TRAMADOL SCREEN URINE: ABNORMAL
TRICYCLIC ANTIDEPRESSANTS, UR: ABNORMAL

## 2020-10-06 PROCEDURE — G8427 DOCREV CUR MEDS BY ELIG CLIN: HCPCS | Performed by: INTERNAL MEDICINE

## 2020-10-06 PROCEDURE — G8484 FLU IMMUNIZE NO ADMIN: HCPCS | Performed by: INTERNAL MEDICINE

## 2020-10-06 PROCEDURE — 80305 DRUG TEST PRSMV DIR OPT OBS: CPT | Performed by: INTERNAL MEDICINE

## 2020-10-06 PROCEDURE — 99213 OFFICE O/P EST LOW 20 MIN: CPT | Performed by: INTERNAL MEDICINE

## 2020-10-06 PROCEDURE — 4004F PT TOBACCO SCREEN RCVD TLK: CPT | Performed by: INTERNAL MEDICINE

## 2020-10-06 PROCEDURE — G8420 CALC BMI NORM PARAMETERS: HCPCS | Performed by: INTERNAL MEDICINE

## 2020-10-06 RX ORDER — BUPRENORPHINE AND NALOXONE 12; 3 MG/1; MG/1
1 FILM, SOLUBLE BUCCAL; SUBLINGUAL DAILY
Qty: 14 FILM | Refills: 0 | OUTPATIENT
Start: 2020-10-06 | End: 2020-10-20 | Stop reason: SDUPTHER

## 2020-10-06 RX ORDER — BUPRENORPHINE AND NALOXONE 12; 3 MG/1; MG/1
1 FILM, SOLUBLE BUCCAL; SUBLINGUAL DAILY
Qty: 7 FILM | Refills: 0 | Status: SHIPPED | OUTPATIENT
Start: 2020-10-06 | End: 2020-10-06 | Stop reason: CLARIF

## 2020-10-06 NOTE — PROGRESS NOTES
Verbal order per Dr. Jasmin Logan for urine drug screen. Positive for BUP. Verified results with Meena Torrez. Dr. Jasmin Logan ordered Suboxone 12mg film daily for patient. Verified dose with patient. Patient was sent home with 1 week script of Suboxone 12mg film daily and will be seen back in the office 10/13/20.
Cocaine Metabolite Screen, Urine  10/06/2020  4:32 PM  Unknown    NEG    FENTANYL SCREEN, URINE  10/06/2020  4:32 PM  Unknown    NEG    Gabapentin Screen, Urine  10/06/2020  4:32 PM  Unknown    N/A    MDMA, Urine  10/06/2020  4:32 PM  Unknown    NEG    Methadone Screen, Urine  10/06/2020  4:32 PM  Unknown    NEG    Methamphetamine, Urine  10/06/2020  4:32 PM  Unknown    NEG    Opiate Scrn, Ur  10/06/2020  4:32 PM  Unknown    NEG    Oxycodone Screen, Ur  10/06/2020  4:32 PM  Unknown    NEG    PCP Screen, Urine  10/06/2020  4:32 PM  Unknown    NEG    Propoxyphene Screen, Urine  10/06/2020  4:32 PM  Unknown    N/A    Synthetic Cannabinoids (K2) Screen, Urine  10/06/2020  4:32 PM  Unknown    NEG    THC Screen, Urine  10/06/2020  4:32 PM  Unknown    NEG    Tramadol Scrn, Ur  10/06/2020  4:32 PM  Unknown    POS    Tricyclic Antidepressants, Urine  10/06/2020  4:32 PM  Unknown       Narrative     NEGATIVE FOR FENTANYL. Diagnosis Orders   1. Severe opioid use disorder (HCC)  POCT Rapid Drug Screen    buprenorphine-naloxone (SUBOXONE) 12-3 MG sublingual film   2. Polysubstance abuse ([facility] [address]     3.  Encounter for monitoring Suboxone maintenance therapy           PLAN:  Patient says he is doing well on this dose  He is having no withdrawal no cravings no side effects from the Suboxone plan will be to continue him on 12 mg films daily  He is working on setting up counseling  Hep C negative  Absolutely denies taking any Ultram  Will send a comprehensive urine  Follow-up next Monday

## 2020-10-20 ENCOUNTER — OFFICE VISIT (OUTPATIENT)
Dept: INTERNAL MEDICINE CLINIC | Age: 30
End: 2020-10-20
Payer: MEDICAID

## 2020-10-20 VITALS
WEIGHT: 168 LBS | SYSTOLIC BLOOD PRESSURE: 123 MMHG | BODY MASS INDEX: 22.75 KG/M2 | TEMPERATURE: 97.7 F | HEART RATE: 62 BPM | DIASTOLIC BLOOD PRESSURE: 60 MMHG | HEIGHT: 72 IN

## 2020-10-20 PROCEDURE — 99213 OFFICE O/P EST LOW 20 MIN: CPT | Performed by: INTERNAL MEDICINE

## 2020-10-20 PROCEDURE — G8420 CALC BMI NORM PARAMETERS: HCPCS | Performed by: INTERNAL MEDICINE

## 2020-10-20 PROCEDURE — 80305 DRUG TEST PRSMV DIR OPT OBS: CPT | Performed by: INTERNAL MEDICINE

## 2020-10-20 PROCEDURE — G8428 CUR MEDS NOT DOCUMENT: HCPCS | Performed by: INTERNAL MEDICINE

## 2020-10-20 PROCEDURE — 4004F PT TOBACCO SCREEN RCVD TLK: CPT | Performed by: INTERNAL MEDICINE

## 2020-10-20 PROCEDURE — G8484 FLU IMMUNIZE NO ADMIN: HCPCS | Performed by: INTERNAL MEDICINE

## 2020-10-20 RX ORDER — BUPRENORPHINE AND NALOXONE 12; 3 MG/1; MG/1
1 FILM, SOLUBLE BUCCAL; SUBLINGUAL DAILY
Qty: 14 FILM | Refills: 0 | Status: SHIPPED | OUTPATIENT
Start: 2020-10-20 | End: 2020-11-03 | Stop reason: SDUPTHER

## 2020-10-20 NOTE — PROGRESS NOTES
Verbal order per Dr. Britta Davalos for urine drug screen. Positive for BUP, THC. Verified results with Rafaela Mliligan LPN. Dr. Britta Davalos ordered Suboxone 12 mg film daily for patient. Verified dose with patient. Patient was sent home with 2 week script for Suboxone 12 mg film daily and will be seen back in the office on 11/3/20.
films daily  He is working on setting up counseling  Hep C negative  Follow-up 2 weeks

## 2020-11-03 ENCOUNTER — OFFICE VISIT (OUTPATIENT)
Dept: INTERNAL MEDICINE CLINIC | Age: 30
End: 2020-11-03
Payer: MEDICAID

## 2020-11-03 VITALS
DIASTOLIC BLOOD PRESSURE: 66 MMHG | HEART RATE: 81 BPM | BODY MASS INDEX: 22.89 KG/M2 | SYSTOLIC BLOOD PRESSURE: 120 MMHG | TEMPERATURE: 97.7 F | HEIGHT: 72 IN | WEIGHT: 169 LBS

## 2020-11-03 PROCEDURE — G8420 CALC BMI NORM PARAMETERS: HCPCS | Performed by: INTERNAL MEDICINE

## 2020-11-03 PROCEDURE — 4004F PT TOBACCO SCREEN RCVD TLK: CPT | Performed by: INTERNAL MEDICINE

## 2020-11-03 PROCEDURE — G8484 FLU IMMUNIZE NO ADMIN: HCPCS | Performed by: INTERNAL MEDICINE

## 2020-11-03 PROCEDURE — 99213 OFFICE O/P EST LOW 20 MIN: CPT | Performed by: INTERNAL MEDICINE

## 2020-11-03 PROCEDURE — 80305 DRUG TEST PRSMV DIR OPT OBS: CPT | Performed by: INTERNAL MEDICINE

## 2020-11-03 PROCEDURE — G8428 CUR MEDS NOT DOCUMENT: HCPCS | Performed by: INTERNAL MEDICINE

## 2020-11-03 RX ORDER — BUPRENORPHINE AND NALOXONE 12; 3 MG/1; MG/1
1 FILM, SOLUBLE BUCCAL; SUBLINGUAL DAILY
Qty: 14 FILM | Refills: 0 | Status: SHIPPED | OUTPATIENT
Start: 2020-11-03 | End: 2020-11-17 | Stop reason: SDUPTHER

## 2020-11-03 NOTE — PROGRESS NOTES
Verbal order per Dr. Randy Oakley for urine drug screen. Positive for BUP THC. Verified results with Kelle Kim MA. Dr. Randy Oakley ordered Suboxone 12mg film daily  for patient. Verified dose with patient. Patient was sent home with 2 week script of Suboxone 12mg film daily and will be seen back in the office 11/17/20.

## 2020-11-03 NOTE — PROGRESS NOTES
MEDICATION ASSISTED TREATMENT ENCOUNTER    HISTORY OF PRESENT ILLNESS  Patient presents for evaluation of opioid use and would like to be placed on medication assisted treatment  he is referred here by his sister who comes here and sees me  I saw him here 10/20  We had a video visit  and in the nurse visit a week later  Patient has had problems using heroin/fentanyl, pain pills  Patient started using [id number] years ago  He said his brother had  of a heroin overdose  Patient uses it snorting  We started Suboxone last week  I gave him 12 mg films over the weekend  He said 1 day he had to use an extra half of a strip    He says he has not used any illicit opioids      ROS     General:   Feels much better on Suboxone no withdrawal symptoms  No urges or cravings  PHYSICAL EXAM   Blood pressure 120/66, pulse 81, temperature 97.7 °F (36.5 °C), height 6' (1.829 m), weight 169 lb (76.7 kg). General: Patient resting comfortably in no acute distress     Mental status: Alert and oriented to person place and time, no hallucinations or delusions     Eyes: Pupils are normal           URINE DRUG SCREEN TODAY:  Recent Labs     20  1616   ALCOHOL NEG   LABAMPH NEG   LABBARB NEG   LABBENZ NEG   BUPRENUR POS   COCAIMETSCRU NEG   FENTSCRUR NEG   GABAPENTIN N/A   MDMA NEG   METAMPU NEG   LABMETH NEG   OPIATESCREENURINE NEG   OXTCOSU NEG   PHENCYCLIDINESCREENURINE NEG   PROPOXYPHENE N/A   SPICEUR NEG   THCSCREENUR POS   TRAMADOLUR NEG   TRICYUR N/A        Diagnosis Orders   1. Severe opioid use disorder (HCC)  POCT Rapid Drug Screen    buprenorphine-naloxone (SUBOXONE) 12-3 MG sublingual film   2. Polysubstance abuse ([facility] [address]     3.  Encounter for monitoring Suboxone maintenance therapy           PLAN:  Patient says he is doing well on this dose  He is having no withdrawal no cravings no side effects from the Suboxone plan will be to continue him on 12 mg films daily  He is working on setting up counseling  Hep C negative  Follow-up 2 weeks

## 2020-11-17 ENCOUNTER — VIRTUAL VISIT (OUTPATIENT)
Dept: INTERNAL MEDICINE CLINIC | Age: 30
End: 2020-11-17
Payer: MEDICAID

## 2020-11-17 ENCOUNTER — TELEPHONE (OUTPATIENT)
Dept: INTERNAL MEDICINE CLINIC | Age: 30
End: 2020-11-17

## 2020-11-17 PROCEDURE — G8428 CUR MEDS NOT DOCUMENT: HCPCS | Performed by: INTERNAL MEDICINE

## 2020-11-17 PROCEDURE — 4004F PT TOBACCO SCREEN RCVD TLK: CPT | Performed by: INTERNAL MEDICINE

## 2020-11-17 PROCEDURE — 99213 OFFICE O/P EST LOW 20 MIN: CPT | Performed by: INTERNAL MEDICINE

## 2020-11-17 PROCEDURE — G8484 FLU IMMUNIZE NO ADMIN: HCPCS | Performed by: INTERNAL MEDICINE

## 2020-11-17 PROCEDURE — G8420 CALC BMI NORM PARAMETERS: HCPCS | Performed by: INTERNAL MEDICINE

## 2020-11-17 RX ORDER — BUPRENORPHINE AND NALOXONE 12; 3 MG/1; MG/1
1 FILM, SOLUBLE BUCCAL; SUBLINGUAL DAILY
Qty: 21 FILM | Refills: 0 | Status: SHIPPED | OUTPATIENT
Start: 2020-11-17 | End: 2020-12-08 | Stop reason: SDUPTHER

## 2020-11-17 NOTE — PROGRESS NOTES
2020    TELEHEALTH EVALUATION -- Audio/Visual (During VQMDE-61 public health emergency)    HPI:  A video conference was done with the patient  Patient was at home, I was in the office  There was no  one else  present during the conference    Chucho Sanderson (:  1990) has requested an audio/video evaluation for the following concern(s):    HPI  I saw him here 11/3  We had a video visit  and in the nurse visit a week later  Patient has had problems using heroin/fentanyl, pain pills  Patient started using [id number] years ago  He said his brother had  of a heroin overdose  Patient uses it snorting  We started Suboxone last week  I gave him 12 mg films over the weekend  He said 1 day he had to use an extra half of a strip     He says he has not used any illicit opioids     ROS-Patient is feeling well  Patient is not experiencing  withdrawal symptoms ,no urges or cravings  Patient is not having any side effects from the buprenorphine      Prior to Visit Medications    Medication Sig Taking? Authorizing Provider   buprenorphine-naloxone (SUBOXONE) 12-3 MG sublingual film Place 1 Film under the tongue daily for 21 days.  Yes Aggie Wright MD       Social History     Tobacco Use    Smoking status: Current Every Day Smoker     Packs/day: 1.00    Smokeless tobacco: Never Used   Substance Use Topics    Alcohol use: Not Currently     Comment: social/occasional    Drug use: No     Comment: last used fentanyl 20            PHYSICAL EXAMINATION:  [ INSTRUCTIONS:  \"[x]\" Indicates a positive item  \"[]\" Indicates a negative item  -- DELETE ALL ITEMS NOT EXAMINED]  No vitals done    Constitutional: [x] Appears well-developed and well-nourished [x] No apparent distress      [] Abnormal-   Mental status  [x] Alert and awake  [x] Oriented to person/place/time [x]Able to follow commands      Eyes:  EOM    [x]  Normal  [] Abnormal-  Sclera  []  Normal  [] Abnormal -         Discharge []  None visible  [] Abnormal -  Pupils normal           Psychiatric:       [x] Normal Affect [] No Hallucinations        [] Abnormal-        Diagnosis Orders   1. Severe opioid use disorder (HCC)  buprenorphine-naloxone (SUBOXONE) 12-3 MG sublingual film   2. Polysubstance abuse ([facility] [address]     3. Encounter for monitoring Suboxone maintenance therapy           Alex Galeana is a 27 y.o. male being evaluated by a Virtual Visit (video visit) encounter to address concerns as mentioned above. A caregiver was present when appropriate. Due to this being a TeleHealth encounter ([facility]), evaluation of the following organ systems was limited: Vitals/Constitutional/EENT/Resp/CV/GI//MS/Neuro/Skin/Heme-Lymph-Imm. Pursuant to the emergency declaration under the [address] [place] [address] authority and the [employer] Act, this Virtual Visit was conducted with patient's (and/or legal guardian's) consent, to reduce the patient's risk of exposure to COVID-19 and provide necessary medical care. The patient (and/or legal guardian) has also been advised to contact this office for worsening conditions or problems, and seek emergency medical treatment and/or call 911 if deemed necessary. Services were provided through a video synchronous discussion virtually to substitute for in-person clinic visit. Doing well  Continue current dose of Suboxone  Follow-up 3 weeks  --Gold Irby MD on 11/17/2020 at 4:01 PM    An electronic signature was used to authenticate this note.

## 2020-11-17 NOTE — TELEPHONE ENCOUNTER
I talked to Patient's wife-she will get message to patient if he can come in early today.  His appointment is at 4:30pm

## 2020-12-07 ENCOUNTER — TELEPHONE (OUTPATIENT)
Dept: INTERNAL MEDICINE CLINIC | Age: 30
End: 2020-12-07

## 2020-12-08 ENCOUNTER — OFFICE VISIT (OUTPATIENT)
Dept: INTERNAL MEDICINE CLINIC | Age: 30
End: 2020-12-08
Payer: MEDICAID

## 2020-12-08 VITALS
DIASTOLIC BLOOD PRESSURE: 59 MMHG | HEIGHT: 72 IN | TEMPERATURE: 97.3 F | WEIGHT: 163 LBS | SYSTOLIC BLOOD PRESSURE: 131 MMHG | BODY MASS INDEX: 22.08 KG/M2 | HEART RATE: 72 BPM

## 2020-12-08 PROCEDURE — 99213 OFFICE O/P EST LOW 20 MIN: CPT | Performed by: INTERNAL MEDICINE

## 2020-12-08 PROCEDURE — G8427 DOCREV CUR MEDS BY ELIG CLIN: HCPCS | Performed by: INTERNAL MEDICINE

## 2020-12-08 PROCEDURE — 80305 DRUG TEST PRSMV DIR OPT OBS: CPT | Performed by: INTERNAL MEDICINE

## 2020-12-08 PROCEDURE — G8484 FLU IMMUNIZE NO ADMIN: HCPCS | Performed by: INTERNAL MEDICINE

## 2020-12-08 PROCEDURE — 4004F PT TOBACCO SCREEN RCVD TLK: CPT | Performed by: INTERNAL MEDICINE

## 2020-12-08 PROCEDURE — G8420 CALC BMI NORM PARAMETERS: HCPCS | Performed by: INTERNAL MEDICINE

## 2020-12-08 RX ORDER — BUPRENORPHINE AND NALOXONE 12; 3 MG/1; MG/1
1 FILM, SOLUBLE BUCCAL; SUBLINGUAL DAILY
Qty: 21 FILM | Refills: 0 | Status: SHIPPED | OUTPATIENT
Start: 2020-12-08 | End: 2020-12-29 | Stop reason: SDUPTHER

## 2020-12-08 NOTE — PROGRESS NOTES
MEDICATION ASSISTED TREATMENT ENCOUNTER    HISTORY OF PRESENT ILLNESS  Patient presents for evaluation of opioid use and would like to be placed on medication assisted treatment  he is referred here by his sister who comes here and sees me  I saw him here 11/3  We had a video visit    Patient has had problems using heroin/fentanyl, pain pills  Patient started using [id number] years ago  He said his brother had  of a heroin overdose  Patient uses it snorting  We started Suboxone last week  I gave him 12 mg films over the weekend  He said 1 day he had to use an extra half of a strip    He says he has not used any illicit opioids      ROS     General:   Feels much better on Suboxone no withdrawal symptoms  No urges or cravings  PHYSICAL EXAM   Blood pressure (!) 131/59, pulse 72, temperature 97.3 °F (36.3 °C), height 6' (1.829 m), weight 163 lb (73.9 kg). General: Patient resting comfortably in no acute distress     Mental status: Alert and oriented to person place and time, no hallucinations or delusions     Eyes: Pupils are normal           URINE DRUG SCREEN TODAY:  Recent Labs     20  1621   ALCOHOL NEG   LABAMPH NEG   LABBARB NEG   LABBENZ NEG   BUPRENUR POS   COCAIMETSCRU NEG   FENTSCRUR NEG   GABAPENTIN N/A   MDMA NEG   METAMPU NEG   LABMETH NEG   OPIATESCREENURINE NEG   OXTCOSU NEG   PHENCYCLIDINESCREENURINE NEG   PROPOXYPHENE N/A   SPICEUR NEG   THCSCREENUR POS   TRAMADOLUR NEG   TRICYUR N/A        Diagnosis Orders   1. Severe opioid use disorder (HCC)  POCT Rapid Drug Screen    buprenorphine-naloxone (SUBOXONE) 12-3 MG sublingual film   2. Polysubstance abuse ([facility] [address]     3.  Encounter for monitoring Suboxone maintenance therapy           PLAN:  Patient says he is doing well on this dose  He is having no withdrawal no cravings no side effects from the Suboxone plan will be to continue him on 12 mg films daily  He is working on setting up counseling  Hep C negative  Follow-up 3  weeks

## 2020-12-08 NOTE — PROGRESS NOTES
Verbal order per Dr. Corrine Eng for urine drug screen. Positive for BUP, THC. Verified results with Court B., LPn. Dr. Corrine Eng ordered Suboxone 12 mg film daily for patient. Verified dose with patient. Patient was sent home with 3 week script for Suboxone 12 mg film daily and will be seen back in the office on 12/29/20.

## 2020-12-29 ENCOUNTER — OFFICE VISIT (OUTPATIENT)
Dept: INTERNAL MEDICINE CLINIC | Age: 30
End: 2020-12-29
Payer: MEDICAID

## 2020-12-29 VITALS
DIASTOLIC BLOOD PRESSURE: 66 MMHG | BODY MASS INDEX: 22.21 KG/M2 | HEIGHT: 72 IN | TEMPERATURE: 98.2 F | SYSTOLIC BLOOD PRESSURE: 134 MMHG | WEIGHT: 164 LBS | HEART RATE: 80 BPM

## 2020-12-29 PROCEDURE — 99213 OFFICE O/P EST LOW 20 MIN: CPT | Performed by: INTERNAL MEDICINE

## 2020-12-29 PROCEDURE — G8428 CUR MEDS NOT DOCUMENT: HCPCS | Performed by: INTERNAL MEDICINE

## 2020-12-29 PROCEDURE — G8484 FLU IMMUNIZE NO ADMIN: HCPCS | Performed by: INTERNAL MEDICINE

## 2020-12-29 PROCEDURE — 4004F PT TOBACCO SCREEN RCVD TLK: CPT | Performed by: INTERNAL MEDICINE

## 2020-12-29 PROCEDURE — G8420 CALC BMI NORM PARAMETERS: HCPCS | Performed by: INTERNAL MEDICINE

## 2020-12-29 PROCEDURE — 80305 DRUG TEST PRSMV DIR OPT OBS: CPT | Performed by: INTERNAL MEDICINE

## 2020-12-29 RX ORDER — BUPRENORPHINE AND NALOXONE 12; 3 MG/1; MG/1
1 FILM, SOLUBLE BUCCAL; SUBLINGUAL DAILY
Qty: 28 FILM | Refills: 0 | Status: SHIPPED | OUTPATIENT
Start: 2020-12-29 | End: 2021-01-26 | Stop reason: SDUPTHER

## 2020-12-29 NOTE — PROGRESS NOTES
Verbal order per Dr. Horald Bloch for urine drug screen. Positive for BUP THC. Verified results with Ema Ewing RN. Dr. Horald Bloch ordered Suboxone 12mg film daily for patient. Verified dose with patient. Patient was sent home with 3 week script of Suboxone 12mg film daily and will be seen back in the office 1/19/21.

## 2020-12-29 NOTE — PROGRESS NOTES
MEDICATION ASSISTED TREATMENT ENCOUNTER    HISTORY OF PRESENT ILLNESS  Patient presents for evaluation of opioid use and would like to be placed on medication assisted treatment  he is referred here by his sister who comes here and sees me  I saw him here   We had a video visit    Patient has had problems using heroin/fentanyl, pain pills  Patient started using [id number] years ago  He said his brother had  of a heroin overdose  Patient uses it snorting  We started Suboxone last week  I gave him 12 mg films over the weekend  He said 1 day he had to use an extra half of a strip    He says he has not used any illicit opioids      ROS     General:   Feels much better on Suboxone no withdrawal symptoms  No urges or cravings  PHYSICAL EXAM   Blood pressure 134/66, pulse 80, temperature 98.2 °F (36.8 °C), height 6' (1.829 m), weight 164 lb (74.4 kg). General: Patient resting comfortably in no acute distress     Mental status: Alert and oriented to person place and time, no hallucinations or delusions     Eyes: Pupils are normal           URINE DRUG SCREEN TODAY:  Recent Labs     20  1636   ALCOHOL NEG   LABAMPH NEG   LABBARB NEG   LABBENZ NEG   BUPRENUR POS   COCAIMETSCRU NEG   FENTSCRUR NEG   GABAPENTIN N/A   MDMA NEG   METAMPU NEG   LABMETH NEG   OPIATESCREENURINE NEG   OXTCOSU NEG   PHENCYCLIDINESCREENURINE NEG   PROPOXYPHENE N/A   SPICEUR NEG   THCSCREENUR POS   TRAMADOLUR NEG   TRICYUR N/A        Diagnosis Orders   1. Severe opioid use disorder (HCC)  POCT Rapid Drug Screen    buprenorphine-naloxone (SUBOXONE) 12-3 MG sublingual film   2. Polysubstance abuse ([facility] [address]     3.  Encounter for monitoring Suboxone maintenance therapy           PLAN:  Patient says he is doing well on this dose  He is having no withdrawal no cravings no side effects from the Suboxone plan will be to continue him on 12 mg films daily  He is working on setting up counseling  Hep C negative  I reviewed the [place] [employer] [facility] Reporting [facility] report     There does not appear to be any discrepancies or overprescribing of controlled substances    Follow-up 4  weeks

## 2021-01-26 ENCOUNTER — OFFICE VISIT (OUTPATIENT)
Dept: INTERNAL MEDICINE CLINIC | Age: 31
End: 2021-01-26
Payer: MEDICAID

## 2021-01-26 VITALS
HEIGHT: 72 IN | BODY MASS INDEX: 22.35 KG/M2 | HEART RATE: 79 BPM | WEIGHT: 165 LBS | DIASTOLIC BLOOD PRESSURE: 60 MMHG | SYSTOLIC BLOOD PRESSURE: 113 MMHG

## 2021-01-26 DIAGNOSIS — F11.20 SEVERE OPIOID USE DISORDER (HCC): Primary | ICD-10-CM

## 2021-01-26 DIAGNOSIS — Z51.81 ENCOUNTER FOR MONITORING SUBOXONE MAINTENANCE THERAPY: ICD-10-CM

## 2021-01-26 DIAGNOSIS — F19.10 POLYSUBSTANCE ABUSE (HCC): ICD-10-CM

## 2021-01-26 DIAGNOSIS — Z79.899 ENCOUNTER FOR MONITORING SUBOXONE MAINTENANCE THERAPY: ICD-10-CM

## 2021-01-26 PROCEDURE — G8420 CALC BMI NORM PARAMETERS: HCPCS | Performed by: INTERNAL MEDICINE

## 2021-01-26 PROCEDURE — G8428 CUR MEDS NOT DOCUMENT: HCPCS | Performed by: INTERNAL MEDICINE

## 2021-01-26 PROCEDURE — G8484 FLU IMMUNIZE NO ADMIN: HCPCS | Performed by: INTERNAL MEDICINE

## 2021-01-26 PROCEDURE — 80305 DRUG TEST PRSMV DIR OPT OBS: CPT | Performed by: INTERNAL MEDICINE

## 2021-01-26 PROCEDURE — 99213 OFFICE O/P EST LOW 20 MIN: CPT | Performed by: INTERNAL MEDICINE

## 2021-01-26 PROCEDURE — 4004F PT TOBACCO SCREEN RCVD TLK: CPT | Performed by: INTERNAL MEDICINE

## 2021-01-26 RX ORDER — BUPRENORPHINE AND NALOXONE 12; 3 MG/1; MG/1
1 FILM, SOLUBLE BUCCAL; SUBLINGUAL DAILY
Qty: 28 FILM | Refills: 0 | Status: SHIPPED | OUTPATIENT
Start: 2021-01-26 | End: 2021-02-23 | Stop reason: SDUPTHER

## 2021-01-26 NOTE — PROGRESS NOTES
MEDICATION ASSISTED TREATMENT ENCOUNTER    HISTORY OF PRESENT ILLNESS  Patient presents for evaluation of opioid use and would like to be placed on medication assisted treatment  he is referred here by his sister who comes here and sees me  I saw him here   We had a video visit    Patient has had problems using heroin/fentanyl, pain pills  Patient started using [id number] years ago  He said his brother had  of a heroin overdose  Patient uses it snorting  We started Suboxone last week  I gave him 12 mg films over the weekend  He said 1 day he had to use an extra half of a strip    He says he has not used any illicit opioids      ROS     General:   Feels much better on Suboxone no withdrawal symptoms  No urges or cravings  PHYSICAL EXAM   Blood pressure 113/60, pulse 79, height 6' (1.829 m), weight 165 lb (74.8 kg). General: Patient resting comfortably in no acute distress     Mental status: Alert and oriented to person place and time, no hallucinations or delusions     Eyes: Pupils are normal           URINE DRUG SCREEN TODAY:  Recent Labs     21  1639   ALCOHOL NEG   LABAMPH NEG   LABBARB NEG   LABBENZ NEG   BUPRENUR POS   COCAIMETSCRU NEG   FENTSCRUR NEG   GABAPENTIN N/A   MDMA NEG   METAMPU NEG   LABMETH NEG   OPIATESCREENURINE NEG   OXTCOSU NEG   PHENCYCLIDINESCREENURINE NEG   PROPOXYPHENE N/A   SPICEUR NEG   THCSCREENUR POS   TRAMADOLUR NEG   TRICYUR N/A        Diagnosis Orders   1. Severe opioid use disorder (HCC)  POCT Rapid Drug Screen    buprenorphine-naloxone (SUBOXONE) 12-3 MG sublingual film   2. Polysubstance abuse ([facility] [address]     3.  Encounter for monitoring Suboxone maintenance therapy           PLAN:  Patient says he is doing well on this dose  He is having no withdrawal no cravings no side effects from the Suboxone plan will be to continue him on 12 mg films daily  He is working on setting up counseling  Hep C negative  I reviewed the [place] [employer] report     There does not appear to be any discrepancies or overprescribing of controlled substances    Follow-up 4  weeks

## 2021-01-26 NOTE — PROGRESS NOTES
Verbal order per Dr. Janneth Pearce for urine drug screen. Positive for BUp, THC. Verified results with Jessica Rosen LPN. Dr. Janneth Pearce ordered Suboxone 12 mg film daily for patient. Verified dose with patient. Patient was sent home with 4 week script for Suboxone 12 mg film daily and will be seen back in the office on 2/23/21.

## 2021-02-23 ENCOUNTER — OFFICE VISIT (OUTPATIENT)
Dept: INTERNAL MEDICINE CLINIC | Age: 31
End: 2021-02-23
Payer: MEDICAID

## 2021-02-23 VITALS
WEIGHT: 165 LBS | DIASTOLIC BLOOD PRESSURE: 87 MMHG | HEART RATE: 83 BPM | SYSTOLIC BLOOD PRESSURE: 132 MMHG | BODY MASS INDEX: 22.35 KG/M2 | HEIGHT: 72 IN

## 2021-02-23 DIAGNOSIS — Z79.899 ENCOUNTER FOR MONITORING SUBOXONE MAINTENANCE THERAPY: ICD-10-CM

## 2021-02-23 DIAGNOSIS — Z51.81 ENCOUNTER FOR MONITORING SUBOXONE MAINTENANCE THERAPY: ICD-10-CM

## 2021-02-23 DIAGNOSIS — F11.20 SEVERE OPIOID USE DISORDER (HCC): Primary | ICD-10-CM

## 2021-02-23 DIAGNOSIS — F19.10 POLYSUBSTANCE ABUSE (HCC): ICD-10-CM

## 2021-02-23 PROCEDURE — 4004F PT TOBACCO SCREEN RCVD TLK: CPT | Performed by: INTERNAL MEDICINE

## 2021-02-23 PROCEDURE — 80305 DRUG TEST PRSMV DIR OPT OBS: CPT | Performed by: INTERNAL MEDICINE

## 2021-02-23 PROCEDURE — G8484 FLU IMMUNIZE NO ADMIN: HCPCS | Performed by: INTERNAL MEDICINE

## 2021-02-23 PROCEDURE — 99213 OFFICE O/P EST LOW 20 MIN: CPT | Performed by: INTERNAL MEDICINE

## 2021-02-23 PROCEDURE — G8428 CUR MEDS NOT DOCUMENT: HCPCS | Performed by: INTERNAL MEDICINE

## 2021-02-23 PROCEDURE — G8420 CALC BMI NORM PARAMETERS: HCPCS | Performed by: INTERNAL MEDICINE

## 2021-02-23 RX ORDER — BUPRENORPHINE AND NALOXONE 12; 3 MG/1; MG/1
1 FILM, SOLUBLE BUCCAL; SUBLINGUAL DAILY
Qty: 28 FILM | Refills: 0 | Status: SHIPPED | OUTPATIENT
Start: 2021-02-23 | End: 2021-03-23 | Stop reason: SDUPTHER

## 2021-02-23 NOTE — PROGRESS NOTES
Verbal order per Dr. Evelia Mckeon for urine drug screen. Positive for BUP THC. Verified results with Anika Haywood. Dr. Evelia Mckeon ordered Subxoone 12mg film daily for patient. Verified dose with patient. Patient was sent home with 4 week script of Suboxone 12mg film daily and will be seen back in the office 3/23/21.

## 2021-02-23 NOTE — PROGRESS NOTES
MEDICATION ASSISTED TREATMENT ENCOUNTER    HISTORY OF PRESENT ILLNESS  Patient presents for evaluation of opioid use and would like to be placed on medication assisted treatment  he is referred here by his sister who comes here and sees me  I saw him here     Patient has had problems using heroin/fentanyl, pain pills  Patient started using [id number] years ago  He said his brother had  of a heroin overdose  Patient uses it snorting  We started Suboxone last week  I gave him 12 mg films over the weekend  He said 1 day he had to use an extra half of a strip    He says he has not used any illicit opioids      ROS     General:   Feels much better on Suboxone no withdrawal symptoms  No urges or cravings  PHYSICAL EXAM   Blood pressure 132/87, pulse 83, height 6' (1.829 m), weight 165 lb (74.8 kg). General: Patient resting comfortably in no acute distress     Mental status: Alert and oriented to person place and time, no hallucinations or delusions     Eyes: Pupils are normal           URINE DRUG SCREEN TODAY:  Recent Labs     21  1634   ALCOHOL NEG   LABAMPH NEG   LABBARB NEG   LABBENZ NEG   BUPRENUR POS   COCAIMETSCRU NEG   FENTSCRUR NEG   GABAPENTIN N/A   MDMA NEG   METAMPU NEG   LABMETH NEG   OPIATESCREENURINE NEG   OXTCOSU NEG   PHENCYCLIDINESCREENURINE NEG   PROPOXYPHENE N/A   SPICEUR NEG   THCSCREENUR POS   TRAMADOLUR NEG   TRICYUR N/A        Diagnosis Orders   1. Severe opioid use disorder (HCC)  POCT Rapid Drug Screen    buprenorphine-naloxone (SUBOXONE) 12-3 MG sublingual film   2. Polysubstance abuse ([facility] [address]     3.  Encounter for monitoring Suboxone maintenance therapy           PLAN:  Patient says he is doing well on this dose  He is having no withdrawal no cravings no side effects from the Suboxone plan will be to continue him on 12 mg films daily  He is working on setting up counseling  Hep C negative  I reviewed the [place] [facility] report     There does not appear to be any discrepancies or overprescribing of controlled substances    Follow-up 4  weeks

## 2021-03-23 ENCOUNTER — OFFICE VISIT (OUTPATIENT)
Dept: INTERNAL MEDICINE CLINIC | Age: 31
End: 2021-03-23
Payer: MEDICAID

## 2021-03-23 VITALS
TEMPERATURE: 98.1 F | WEIGHT: 155 LBS | SYSTOLIC BLOOD PRESSURE: 109 MMHG | HEART RATE: 58 BPM | BODY MASS INDEX: 20.99 KG/M2 | HEIGHT: 72 IN | DIASTOLIC BLOOD PRESSURE: 64 MMHG

## 2021-03-23 DIAGNOSIS — F19.10 POLYSUBSTANCE ABUSE (HCC): ICD-10-CM

## 2021-03-23 DIAGNOSIS — Z79.899 ENCOUNTER FOR MONITORING SUBOXONE MAINTENANCE THERAPY: ICD-10-CM

## 2021-03-23 DIAGNOSIS — F11.20 SEVERE OPIOID USE DISORDER (HCC): Primary | ICD-10-CM

## 2021-03-23 DIAGNOSIS — R63.4 WEIGHT LOSS: ICD-10-CM

## 2021-03-23 DIAGNOSIS — Z51.81 ENCOUNTER FOR MONITORING SUBOXONE MAINTENANCE THERAPY: ICD-10-CM

## 2021-03-23 PROCEDURE — 4004F PT TOBACCO SCREEN RCVD TLK: CPT | Performed by: INTERNAL MEDICINE

## 2021-03-23 PROCEDURE — G8427 DOCREV CUR MEDS BY ELIG CLIN: HCPCS | Performed by: INTERNAL MEDICINE

## 2021-03-23 PROCEDURE — 80305 DRUG TEST PRSMV DIR OPT OBS: CPT | Performed by: INTERNAL MEDICINE

## 2021-03-23 PROCEDURE — G8484 FLU IMMUNIZE NO ADMIN: HCPCS | Performed by: INTERNAL MEDICINE

## 2021-03-23 PROCEDURE — G8420 CALC BMI NORM PARAMETERS: HCPCS | Performed by: INTERNAL MEDICINE

## 2021-03-23 PROCEDURE — 99213 OFFICE O/P EST LOW 20 MIN: CPT | Performed by: INTERNAL MEDICINE

## 2021-03-23 RX ORDER — BUPRENORPHINE AND NALOXONE 12; 3 MG/1; MG/1
1 FILM, SOLUBLE BUCCAL; SUBLINGUAL DAILY
Qty: 28 FILM | Refills: 0 | Status: SHIPPED | OUTPATIENT
Start: 2021-03-23 | End: 2021-04-20 | Stop reason: SDUPTHER

## 2021-03-23 NOTE — PROGRESS NOTES
Verbal order per Dr. Michael Durham for urine drug screen. Positive for BUP, THC. Verified results with Bry Hill LPN. Dr. Michael Durham ordered Suboxone 12 mg film daily for patient. Verified dose with patient. Patient was sent home with 4 week script for Suboxone 12 mg film daily and will be seen back in the office on 4/20/21.

## 2021-03-23 NOTE — PROGRESS NOTES
MEDICATION ASSISTED TREATMENT ENCOUNTER    HISTORY OF PRESENT ILLNESS  Patient presents for evaluation of opioid use and would like to be placed on medication assisted treatment  he is referred here by his sister who comes here and sees me  I saw him here     Patient has had problems using heroin/fentanyl, pain pills  Patient started using [id number] years ago  He said his brother had  of a heroin overdose  Patient uses it snorting  We started Suboxone last week  I gave him 12 mg films over the weekend  He said 1 day he had to use an extra half of a strip    He says he has not used any illicit opioids      ROS     General:   Feels much better on Suboxone no withdrawal symptoms  No urges or cravings  He says he is losing weight  PHYSICAL EXAM   Blood pressure 109/64, pulse 58, temperature 98.1 °F (36.7 °C), height 6' (1.829 m), weight 155 lb (70.3 kg). General: Patient resting comfortably in no acute distress     Mental status: Alert and oriented to person place and time, no hallucinations or delusions     Eyes: Pupils are normal           URINE DRUG SCREEN TODAY:  Recent Labs     21  1630   ALCOHOL NEG   LABAMPH NEG   LABBARB NEG   LABBENZ NEG   BUPRENUR POS   COCAIMETSCRU NEG   FENTSCRUR NEG   GABAPENTIN N/A   MDMA NEG   METAMPU NEG   LABMETH NEG   OPIATESCREENURINE NEG   OXTCOSU NEG   PHENCYCLIDINESCREENURINE NEG   PROPOXYPHENE N/A   SPICEUR NEG   THCSCREENUR POS   TRAMADOLUR NEG   TRICYUR N/A        Diagnosis Orders   1. Severe opioid use disorder (HCC)  POCT Rapid Drug Screen    buprenorphine-naloxone (SUBOXONE) 12-3 MG sublingual film   2. Weight loss  CBC    Comprehensive Metabolic Panel    TSH With Reflex Ft4   3. Polysubstance abuse ([address]     4.  Encounter for monitoring Suboxone maintenance therapy           PLAN:  Patient says he is doing well on this dose  He is having no withdrawal no cravings no side effects from the Suboxone plan will be to continue him on 12 mg films daily  He is working on setting up counseling  Hep C negative  I reviewed the [place] [facility] report     There does not appear to be any discrepancies or overprescribing of controlled substances  He has lost 15 pounds but he says he walks about 10 miles a day at work  He has a good appetite  He wears a monitor that says he burns 3000 arturo a day at work  We will check CBC TSH CMP  Follow-up 4 weeks  Follow-up 4  weeks

## 2021-04-20 ENCOUNTER — OFFICE VISIT (OUTPATIENT)
Dept: INTERNAL MEDICINE CLINIC | Age: 31
End: 2021-04-20
Payer: MEDICAID

## 2021-04-20 VITALS
HEIGHT: 72 IN | TEMPERATURE: 97.8 F | HEART RATE: 75 BPM | DIASTOLIC BLOOD PRESSURE: 72 MMHG | WEIGHT: 156 LBS | SYSTOLIC BLOOD PRESSURE: 120 MMHG | BODY MASS INDEX: 21.13 KG/M2

## 2021-04-20 DIAGNOSIS — F11.20 SEVERE OPIOID USE DISORDER (HCC): Primary | ICD-10-CM

## 2021-04-20 DIAGNOSIS — F19.10 POLYSUBSTANCE ABUSE (HCC): ICD-10-CM

## 2021-04-20 DIAGNOSIS — R63.4 WEIGHT LOSS: ICD-10-CM

## 2021-04-20 DIAGNOSIS — Z51.81 ENCOUNTER FOR MONITORING SUBOXONE MAINTENANCE THERAPY: ICD-10-CM

## 2021-04-20 DIAGNOSIS — Z79.899 ENCOUNTER FOR MONITORING SUBOXONE MAINTENANCE THERAPY: ICD-10-CM

## 2021-04-20 PROCEDURE — 99213 OFFICE O/P EST LOW 20 MIN: CPT | Performed by: INTERNAL MEDICINE

## 2021-04-20 PROCEDURE — 4004F PT TOBACCO SCREEN RCVD TLK: CPT | Performed by: INTERNAL MEDICINE

## 2021-04-20 PROCEDURE — G8428 CUR MEDS NOT DOCUMENT: HCPCS | Performed by: INTERNAL MEDICINE

## 2021-04-20 PROCEDURE — 80305 DRUG TEST PRSMV DIR OPT OBS: CPT | Performed by: INTERNAL MEDICINE

## 2021-04-20 PROCEDURE — G8420 CALC BMI NORM PARAMETERS: HCPCS | Performed by: INTERNAL MEDICINE

## 2021-04-20 RX ORDER — BUPRENORPHINE AND NALOXONE 12; 3 MG/1; MG/1
1 FILM, SOLUBLE BUCCAL; SUBLINGUAL DAILY
Qty: 28 FILM | Refills: 0 | Status: SHIPPED | OUTPATIENT
Start: 2021-04-20 | End: 2021-05-18 | Stop reason: SDUPTHER

## 2021-04-20 NOTE — PROGRESS NOTES
Verbal order per Dr. Olga Kuhn for urine drug screen. Positive for BUP THC. Verified results with Kristin Fiore RN. Dr. Olga Kuhn ordered Suboxone 12mg film daily for patient. Verified dose with patient. Patient was sent home with 4 week script of Suboxone 12mg film daily and will be seen back in the office 5/18/21.
PHENCYCLIDINESCREENURINE NEG   PROPOXYPHENE N/A   SPICEUR NEG   THCSCREENUR POS   TRAMADOLUR NEG   TRICYUR N/A        Diagnosis Orders   1. Severe opioid use disorder (HCC)  POCT Rapid Drug Screen    buprenorphine-naloxone (SUBOXONE) 12-3 MG sublingual film   2. Polysubstance abuse ([facility] [address]     3. Encounter for monitoring Suboxone maintenance therapy     4.  Weight loss           PLAN:  Patient says he is doing well on this dose  He is having no withdrawal no cravings no side effects from the Suboxone plan will be to continue him on 12 mg films daily  He is working on setting up counseling  Hep C negative  I reviewed the [employer] report     There does not appear to be any discrepancies or overprescribing of controlled substances  He has lost 15 pounds but he says he walks about 10 miles a day at work  He has a good appetite  He wears a monitor that says he burns 3000 arturo a day at work  I ordered CBC TSH CMP  He says he has not got these done yet but he will  Follow-up 4 weeks

## 2021-05-18 ENCOUNTER — NURSE ONLY (OUTPATIENT)
Dept: LAB | Age: 31
End: 2021-05-18

## 2021-05-18 ENCOUNTER — OFFICE VISIT (OUTPATIENT)
Dept: INTERNAL MEDICINE CLINIC | Age: 31
End: 2021-05-18
Payer: MEDICAID

## 2021-05-18 VITALS
SYSTOLIC BLOOD PRESSURE: 134 MMHG | BODY MASS INDEX: 21.07 KG/M2 | HEIGHT: 73 IN | HEART RATE: 72 BPM | WEIGHT: 159 LBS | DIASTOLIC BLOOD PRESSURE: 96 MMHG | TEMPERATURE: 97.7 F

## 2021-05-18 DIAGNOSIS — Z79.899 ENCOUNTER FOR MONITORING SUBOXONE MAINTENANCE THERAPY: ICD-10-CM

## 2021-05-18 DIAGNOSIS — F11.20 SEVERE OPIOID USE DISORDER (HCC): ICD-10-CM

## 2021-05-18 DIAGNOSIS — Z51.81 ENCOUNTER FOR MONITORING SUBOXONE MAINTENANCE THERAPY: ICD-10-CM

## 2021-05-18 DIAGNOSIS — F11.20 SEVERE OPIOID USE DISORDER (HCC): Primary | ICD-10-CM

## 2021-05-18 DIAGNOSIS — R63.4 WEIGHT LOSS: ICD-10-CM

## 2021-05-18 DIAGNOSIS — F19.10 POLYSUBSTANCE ABUSE (HCC): ICD-10-CM

## 2021-05-18 LAB
ALBUMIN SERPL-MCNC: 4.4 G/DL (ref 3.5–5.1)
ALCOHOL URINE: ABNORMAL
ALP BLD-CCNC: 55 U/L (ref 38–126)
ALT SERPL-CCNC: 26 U/L (ref 11–66)
AMPHETAMINE SCREEN, URINE: ABNORMAL
ANION GAP SERPL CALCULATED.3IONS-SCNC: 8 MEQ/L (ref 8–16)
AST SERPL-CCNC: 30 U/L (ref 5–40)
BARBITURATE SCREEN, URINE: ABNORMAL
BENZODIAZEPINE SCREEN, URINE: ABNORMAL
BILIRUB SERPL-MCNC: 0.2 MG/DL (ref 0.3–1.2)
BUN BLDV-MCNC: 18 MG/DL (ref 7–22)
BUPRENORPHINE URINE: ABNORMAL
CALCIUM SERPL-MCNC: 9.1 MG/DL (ref 8.5–10.5)
CHLORIDE BLD-SCNC: 105 MEQ/L (ref 98–111)
CO2: 28 MEQ/L (ref 23–33)
COCAINE METABOLITE SCREEN URINE: ABNORMAL
CREAT SERPL-MCNC: 1.1 MG/DL (ref 0.4–1.2)
ERYTHROCYTE [DISTWIDTH] IN BLOOD BY AUTOMATED COUNT: 12.5 % (ref 11.5–14.5)
ERYTHROCYTE [DISTWIDTH] IN BLOOD BY AUTOMATED COUNT: 42.5 FL (ref 35–45)
FENTANYL SCREEN, URINE: ABNORMAL
GABAPENTIN SCREEN, URINE: ABNORMAL
GFR SERPL CREATININE-BSD FRML MDRD: 78 ML/MIN/1.73M2
GLUCOSE BLD-MCNC: 98 MG/DL (ref 70–108)
HCT VFR BLD CALC: 40.9 % (ref 42–52)
HEMOGLOBIN: 13.2 GM/DL (ref 14–18)
MCH RBC QN AUTO: 29.9 PG (ref 26–33)
MCHC RBC AUTO-ENTMCNC: 32.3 GM/DL (ref 32.2–35.5)
MCV RBC AUTO: 92.5 FL (ref 80–94)
MDMA URINE: ABNORMAL
METHADONE SCREEN, URINE: ABNORMAL
METHAMPHETAMINE, URINE: ABNORMAL
OPIATE SCREEN URINE: ABNORMAL
OXYCODONE SCREEN URINE: ABNORMAL
PHENCYCLIDINE SCREEN URINE: ABNORMAL
PLATELET # BLD: 111 THOU/MM3 (ref 130–400)
PMV BLD AUTO: 13.6 FL (ref 9.4–12.4)
POTASSIUM SERPL-SCNC: 4.2 MEQ/L (ref 3.5–5.2)
PROPOXYPHENE SCREEN, URINE: ABNORMAL
RBC # BLD: 4.42 MILL/MM3 (ref 4.7–6.1)
SODIUM BLD-SCNC: 141 MEQ/L (ref 135–145)
SYNTHETIC CANNABINOIDS(K2) SCREEN, URINE: ABNORMAL
THC SCREEN, URINE: ABNORMAL
TOTAL PROTEIN: 7.1 G/DL (ref 6.1–8)
TRAMADOL SCREEN URINE: ABNORMAL
TRICYCLIC ANTIDEPRESSANTS, UR: ABNORMAL
TSH SERPL DL<=0.05 MIU/L-ACNC: 2.13 UIU/ML (ref 0.4–4.2)
WBC # BLD: 5.6 THOU/MM3 (ref 4.8–10.8)

## 2021-05-18 PROCEDURE — 99213 OFFICE O/P EST LOW 20 MIN: CPT | Performed by: INTERNAL MEDICINE

## 2021-05-18 PROCEDURE — 4004F PT TOBACCO SCREEN RCVD TLK: CPT | Performed by: INTERNAL MEDICINE

## 2021-05-18 PROCEDURE — 80305 DRUG TEST PRSMV DIR OPT OBS: CPT | Performed by: INTERNAL MEDICINE

## 2021-05-18 PROCEDURE — G8420 CALC BMI NORM PARAMETERS: HCPCS | Performed by: INTERNAL MEDICINE

## 2021-05-18 PROCEDURE — G8428 CUR MEDS NOT DOCUMENT: HCPCS | Performed by: INTERNAL MEDICINE

## 2021-05-18 RX ORDER — BUPRENORPHINE AND NALOXONE 12; 3 MG/1; MG/1
1 FILM, SOLUBLE BUCCAL; SUBLINGUAL DAILY
Qty: 28 FILM | Refills: 0 | Status: SHIPPED | OUTPATIENT
Start: 2021-05-18 | End: 2021-06-15 | Stop reason: SDUPTHER

## 2021-05-18 NOTE — PROGRESS NOTES
Verbal order per Dr. Reagan Gamez for urine drug screen. Positive for BUP THC. Verified results with Candis Castleman RN. Dr. Reagan Gamez ordered Suboxone 12mg film daily for patient. Verified dose with patient. Patient was sent home with 4 week script of Suboxone 12mg film daily and will be seen back in the office 6/15/21.

## 2021-05-18 NOTE — PROGRESS NOTES
MEDICATION ASSISTED TREATMENT ENCOUNTER    HISTORY OF PRESENT ILLNESS  Patient presents for evaluation of opioid use and would like to be placed on medication assisted treatment  he is referred here by his sister who comes here and sees me  I saw him here     Patient has had problems using heroin/fentanyl, pain pills  Patient started using [id number] years ago  He said his brother had  of a heroin overdose  Patient uses it snorting  We started Suboxone last week  I gave him 12 mg films over the weekend  He said 1 day he had to use an extra half of a strip    He says he has not used any illicit opioids      ROS     General:   Feels much better on Suboxone no withdrawal symptoms  No urges or cravings  He says he is losing weight  PHYSICAL EXAM   Blood pressure (!) 134/96, pulse 72, temperature 97.7 °F (36.5 °C), height 6' 1\" (1.854 m), weight 159 lb (72.1 kg).          General: Patient resting comfortably in no acute distress   Mental Status Examination:  Level of consciousness:  within normal limits and awake  Appearance:  well-appearing, in chair, good grooming and good hygiene  Behavior/Motor:  {Normal  Attitude toward examiner:  cooperative and attentive  Speech:  spontaneous and normal volume  Mood: normal  Affect:  mood congruent  Thought processes:  linear  Thought content:  Denies homicidal ideations  Suicidal Ideation:  denies suicidal ideation  Delusions:  no evidence of delusions  Perceptual Disturbance:  denies any perceptual disturbance  Cognition:  oriented to person, place, and time    Insight : good  Judgment: good  Medication Side Effects:none       Eyes: Pupils are normal           URINE DRUG SCREEN TODAY:  Recent Labs     21  0904   ALCOHOL NEG   LABAMPH NEG   LABBARB NEG   LABBENZ NEG   BUPRENUR POS   COCAIMETSCRU NEG   FENTSCRUR NEG   GABAPENTIN N/A   MDMA NEG   METAMPU NEG   LABMETH NEG   OPIATESCREENURINE NEG   OXTCOSU NEG   PHENCYCLIDINESCREENURINE NEG   PROPOXYPHENE N/A   SPICEUR NEG   THCSCREENUR POS   TRAMADOLUR NEG   TRICYUR N/A        Diagnosis Orders   1. Severe opioid use disorder (HCC)  POCT Rapid Drug Screen    buprenorphine-naloxone (SUBOXONE) 12-3 MG sublingual film    Comprehensive Metabolic Panel    TSH With Reflex Ft4    CBC   2. Weight loss     3. Polysubstance abuse ([address]     4.  Encounter for monitoring Suboxone maintenance therapy           PLAN:  Patient says he is doing well on this dose  He is having no withdrawal no cravings no side effects from the Suboxone plan will be to continue him on 12 mg films daily  He is working on setting up counseling  Hep C negative  I reviewed the [place] [facility] report     There does not appear to be any discrepancies or overprescribing of controlled substances  He has lost 15 pounds but he says he walks about 10 miles a day at work  He has a good appetite  He wears a monitor that says he burns 3000 arturo a day at work  I ordered CBC TSH CMP  He says he has not got these done yet but he will  Follow-up 4 weeks  I reviewed the [place] [facility] report     There does not appear to be any discrepancies or overprescribing of controlled substances

## 2021-06-15 ENCOUNTER — OFFICE VISIT (OUTPATIENT)
Dept: INTERNAL MEDICINE CLINIC | Age: 31
End: 2021-06-15
Payer: MEDICAID

## 2021-06-15 VITALS
WEIGHT: 163 LBS | DIASTOLIC BLOOD PRESSURE: 60 MMHG | HEIGHT: 73 IN | SYSTOLIC BLOOD PRESSURE: 105 MMHG | TEMPERATURE: 97.9 F | BODY MASS INDEX: 21.6 KG/M2 | HEART RATE: 71 BPM

## 2021-06-15 DIAGNOSIS — F11.20 SEVERE OPIOID USE DISORDER (HCC): Primary | ICD-10-CM

## 2021-06-15 DIAGNOSIS — Z51.81 ENCOUNTER FOR MONITORING SUBOXONE MAINTENANCE THERAPY: ICD-10-CM

## 2021-06-15 DIAGNOSIS — Z79.899 ENCOUNTER FOR MONITORING SUBOXONE MAINTENANCE THERAPY: ICD-10-CM

## 2021-06-15 DIAGNOSIS — F19.10 POLYSUBSTANCE ABUSE (HCC): ICD-10-CM

## 2021-06-15 PROCEDURE — 99213 OFFICE O/P EST LOW 20 MIN: CPT | Performed by: INTERNAL MEDICINE

## 2021-06-15 PROCEDURE — G8420 CALC BMI NORM PARAMETERS: HCPCS | Performed by: INTERNAL MEDICINE

## 2021-06-15 PROCEDURE — 4004F PT TOBACCO SCREEN RCVD TLK: CPT | Performed by: INTERNAL MEDICINE

## 2021-06-15 PROCEDURE — G8427 DOCREV CUR MEDS BY ELIG CLIN: HCPCS | Performed by: INTERNAL MEDICINE

## 2021-06-15 PROCEDURE — 80305 DRUG TEST PRSMV DIR OPT OBS: CPT | Performed by: INTERNAL MEDICINE

## 2021-06-15 RX ORDER — BUPRENORPHINE AND NALOXONE 12; 3 MG/1; MG/1
1 FILM, SOLUBLE BUCCAL; SUBLINGUAL DAILY
Qty: 28 FILM | Refills: 0 | Status: SHIPPED | OUTPATIENT
Start: 2021-06-15 | End: 2021-07-13 | Stop reason: SDUPTHER

## 2021-06-15 NOTE — PROGRESS NOTES
MEDICATION ASSISTED TREATMENT ENCOUNTER    HISTORY OF PRESENT ILLNESS  Patient presents for evaluation of opioid use and would like to be placed on medication assisted treatment  he is referred here by his sister who comes here and sees me  I saw him here     Patient has had problems using heroin/fentanyl, pain pills  Patient started using [id number] years ago  He said his brother had  of a heroin overdose  Patient uses it snorting  We started Suboxone last week  I gave him 12 mg films over the weekend  He said 1 day he had to use an extra half of a strip    He says he has not used any illicit opioids    He works at [employer]:   Feels much better on Suboxone no withdrawal symptoms  No urges or cravings  He says he is losing weight  PHYSICAL EXAM   Blood pressure 105/60, pulse 71, temperature 97.9 °F (36.6 °C), height 6' 1\" (1.854 m), weight 163 lb (73.9 kg).          General: Patient resting comfortably in no acute distress   Mental Status Examination:  Level of consciousness:  within normal limits and awake  Appearance:  well-appearing, in chair, good grooming and good hygiene  Behavior/Motor:  {Normal  Attitude toward examiner:  cooperative and attentive  Speech:  spontaneous and normal volume  Mood: normal  Affect:  mood congruent  Thought processes:  linear  Thought content:  Denies homicidal ideations  Suicidal Ideation:  denies suicidal ideation  Delusions:  no evidence of delusions  Perceptual Disturbance:  denies any perceptual disturbance  Cognition:  oriented to person, place, and time    Insight : good  Judgment: good  Medication Side Effects:none       Eyes: Pupils are normal           URINE DRUG SCREEN TODAY:  Recent Labs     06/15/21  0850   ALCOHOL NEG   LABAMPH NEG   LABBARB NEG   LABBENZ NEG   BUPRENUR POS   COCAIMETSCRU NEG   FENTSCRUR NEG   GABAPENTIN N/A   MDMA NEG   METAMPU NEG   LABMETH NEG OPIATESCREENURINE NEG   OXTCOSU NEG   PHENCYCLIDINESCREENURINE NEG   PROPOXYPHENE N/A   SPICEUR NEG   THCSCREENUR POS   TRAMADOLUR NEG   TRICYUR N/A        Diagnosis Orders   1. Severe opioid use disorder (HCC)  POCT Rapid Drug Screen    buprenorphine-naloxone (SUBOXONE) 12-3 MG sublingual film   2. Polysubstance abuse ([address]     3.  Encounter for monitoring Suboxone maintenance therapy           PLAN:  Patient says he is doing well on this dose  He is having no withdrawal no cravings no side effects from the Suboxone plan will be to continue him on 12 mg films daily  He is working on setting up counseling  Hep C negative  I reviewed the [place] [facility] report     There does not appear to be any discrepancies or overprescribing of controlled substances  He has lost 15 pounds but he says he walks about 10 miles a day at work  He says he has left that job does not walk as much  He has gained about 4 5 pounds  I ordered CBC TSH CMP ,were normal    Follow-up 4 weeks  I reviewed the [place] [facility] report     There does not appear to be any discrepancies or overprescribing of controlled substances

## 2021-06-15 NOTE — PROGRESS NOTES
Verbal order per Dr. Alok Chu for urine drug screen. Positive for BUP THC. Verified results with Blanca Meyer RN. Dr. Alok Chu ordered Suboxone 12mg film daily for patient. Verified dose with patient. Patient was sent home with 4 week script of Suboxone 12mg film daily and will be seen back in the office 7/13/21.

## 2021-07-13 ENCOUNTER — OFFICE VISIT (OUTPATIENT)
Dept: INTERNAL MEDICINE CLINIC | Age: 31
End: 2021-07-13
Payer: MEDICAID

## 2021-07-13 VITALS
TEMPERATURE: 97.2 F | WEIGHT: 162 LBS | SYSTOLIC BLOOD PRESSURE: 109 MMHG | HEIGHT: 73 IN | HEART RATE: 67 BPM | BODY MASS INDEX: 21.47 KG/M2 | DIASTOLIC BLOOD PRESSURE: 69 MMHG

## 2021-07-13 DIAGNOSIS — F11.20 SEVERE OPIOID USE DISORDER (HCC): Primary | ICD-10-CM

## 2021-07-13 DIAGNOSIS — F19.10 POLYSUBSTANCE ABUSE (HCC): ICD-10-CM

## 2021-07-13 DIAGNOSIS — Z79.899 ENCOUNTER FOR MONITORING SUBOXONE MAINTENANCE THERAPY: ICD-10-CM

## 2021-07-13 DIAGNOSIS — Z51.81 ENCOUNTER FOR MONITORING SUBOXONE MAINTENANCE THERAPY: ICD-10-CM

## 2021-07-13 PROCEDURE — 80305 DRUG TEST PRSMV DIR OPT OBS: CPT | Performed by: INTERNAL MEDICINE

## 2021-07-13 PROCEDURE — G8428 CUR MEDS NOT DOCUMENT: HCPCS | Performed by: INTERNAL MEDICINE

## 2021-07-13 PROCEDURE — G8420 CALC BMI NORM PARAMETERS: HCPCS | Performed by: INTERNAL MEDICINE

## 2021-07-13 PROCEDURE — 99213 OFFICE O/P EST LOW 20 MIN: CPT | Performed by: INTERNAL MEDICINE

## 2021-07-13 PROCEDURE — 4004F PT TOBACCO SCREEN RCVD TLK: CPT | Performed by: INTERNAL MEDICINE

## 2021-07-13 RX ORDER — BUPRENORPHINE AND NALOXONE 12; 3 MG/1; MG/1
1 FILM, SOLUBLE BUCCAL; SUBLINGUAL DAILY
Qty: 28 FILM | Refills: 0 | Status: SHIPPED | OUTPATIENT
Start: 2021-07-13 | End: 2021-08-10 | Stop reason: SDUPTHER

## 2021-07-13 NOTE — PROGRESS NOTES
Verbal order per Dr. Tony Alcala for urine drug screen. Positive for BUP THC. Verified results with Yani Huang. Dr. Tony Alcala ordered Suboxone 12mg film daily for patient. Verified dose with patient. Patient was sent home with 4 week script of Suboxone 12mg film daily and will be seen back in the office 8/10/21.

## 2021-07-13 NOTE — PROGRESS NOTES
MEDICATION ASSISTED TREATMENT ENCOUNTER    HISTORY OF PRESENT ILLNESS  Patient presents for evaluation of opioid use and would like to be placed on medication assisted treatment  he is referred here by his sister who comes here and sees me  I saw him here 6/15    Patient has had problems using heroin/fentanyl, pain pills  Patient started using [id number] years ago  He said his brother had  of a heroin overdose  Patient uses it snorting  We started Suboxone last week  I gave him 12 mg films over the weekend  He said 1 day he had to use an extra half of a strip    He says he has not used any illicit opioids    He works at [employer]:   Feels much better on Suboxone no withdrawal symptoms  No urges or cravings  He says he is losing weight  PHYSICAL EXAM   Blood pressure 109/69, pulse 67, temperature 97.2 °F (36.2 °C), height 6' 1\" (1.854 m), weight 162 lb (73.5 kg).          General: Patient resting comfortably in no acute distress   Mental Status Examination:  Level of consciousness:  within normal limits and awake  Appearance:  well-appearing, in chair, good grooming and good hygiene  Behavior/Motor:  {Normal  Attitude toward examiner:  cooperative and attentive  Speech:  spontaneous and normal volume  Mood: normal  Affect:  mood congruent  Thought processes:  linear  Thought content:  Denies homicidal ideations  Suicidal Ideation:  denies suicidal ideation  Delusions:  no evidence of delusions  Perceptual Disturbance:  denies any perceptual disturbance  Cognition:  oriented to person, place, and time    Insight : good  Judgment: good  Medication Side Effects:none       Eyes: Pupils are normal           URINE DRUG SCREEN TODAY:  Recent Labs     21  0858   ALCOHOL NEG   LABAMPH NEG   LABBARB NEG   LABBENZ NEG   BUPRENUR POS   COCAIMETSCRU NEG   FENTSCRUR NEG   GABAPENTIN N/A   MDMA NEG   METAMPU NEG   LABMETH NEG OPIATESCREENURINE NEG   OXTCOSU NEG   PHENCYCLIDINESCREENURINE NEG   PROPOXYPHENE N/A   SPICEUR NEG   THCSCREENUR POS   TRAMADOLUR NEG   TRICYUR N/A        Diagnosis Orders   1. Severe opioid use disorder (HCC)  POCT Rapid Drug Screen    buprenorphine-naloxone (SUBOXONE) 12-3 MG sublingual film   2. Polysubstance abuse ([address]     3.  Encounter for monitoring Suboxone maintenance therapy           PLAN:  Patient says he is doing well on this dose  He is having no withdrawal no cravings no side effects from the Suboxone plan will be to continue him on 12 mg films daily  He is working on setting up counseling  Hep C negative  I reviewed the [place] [facility] report     There does not appear to be any discrepancies or overprescribing of controlled substances    Follow-up 4 weeks

## 2021-08-10 ENCOUNTER — OFFICE VISIT (OUTPATIENT)
Dept: INTERNAL MEDICINE CLINIC | Age: 31
End: 2021-08-10
Payer: MEDICAID

## 2021-08-10 VITALS
TEMPERATURE: 98.3 F | DIASTOLIC BLOOD PRESSURE: 62 MMHG | BODY MASS INDEX: 21.6 KG/M2 | HEIGHT: 73 IN | SYSTOLIC BLOOD PRESSURE: 109 MMHG | WEIGHT: 163 LBS | HEART RATE: 61 BPM

## 2021-08-10 DIAGNOSIS — F11.20 SEVERE OPIOID USE DISORDER (HCC): Primary | ICD-10-CM

## 2021-08-10 DIAGNOSIS — Z79.899 ENCOUNTER FOR MONITORING SUBOXONE MAINTENANCE THERAPY: ICD-10-CM

## 2021-08-10 DIAGNOSIS — Z51.81 ENCOUNTER FOR MONITORING SUBOXONE MAINTENANCE THERAPY: ICD-10-CM

## 2021-08-10 DIAGNOSIS — F19.10 POLYSUBSTANCE ABUSE (HCC): ICD-10-CM

## 2021-08-10 PROCEDURE — G8420 CALC BMI NORM PARAMETERS: HCPCS | Performed by: INTERNAL MEDICINE

## 2021-08-10 PROCEDURE — 99213 OFFICE O/P EST LOW 20 MIN: CPT | Performed by: INTERNAL MEDICINE

## 2021-08-10 PROCEDURE — 80305 DRUG TEST PRSMV DIR OPT OBS: CPT | Performed by: INTERNAL MEDICINE

## 2021-08-10 PROCEDURE — 4004F PT TOBACCO SCREEN RCVD TLK: CPT | Performed by: INTERNAL MEDICINE

## 2021-08-10 PROCEDURE — G8428 CUR MEDS NOT DOCUMENT: HCPCS | Performed by: INTERNAL MEDICINE

## 2021-08-10 RX ORDER — BUPRENORPHINE AND NALOXONE 12; 3 MG/1; MG/1
1 FILM, SOLUBLE BUCCAL; SUBLINGUAL DAILY
Qty: 28 FILM | Refills: 0 | Status: SHIPPED | OUTPATIENT
Start: 2021-08-10 | End: 2021-09-07 | Stop reason: SDUPTHER

## 2021-08-10 NOTE — PROGRESS NOTES
Verbal order per Dr. Jasmin Logan for urine drug screen. Positive for BUP THC. Verified results with Camila Morton. Dr. Jasmin Logan ordered Suboxone 12mg film daily for patient. Verified dose with patient. Patient was sent home with 4 week script of Suboxone 12mg film daily and will be seen back in the office 9/7/21.

## 2021-08-10 NOTE — PROGRESS NOTES
MEDICATION ASSISTED TREATMENT ENCOUNTER    HISTORY OF PRESENT ILLNESS  Patient presents for evaluation of opioid use and would like to be placed on medication assisted treatment  he is referred here by his sister who comes here and sees me  I saw him here     Patient has had problems using heroin/fentanyl, pain pills  Patient started using [id number] years ago  He said his brother had  of a heroin overdose  Patient uses it snorting  We started Suboxone last week  I gave him 12 mg films over the weekend  He said 1 day he had to use an extra half of a strip    He says he has not used any illicit opioids    He works at [employer]:   Feels much better on Suboxone no withdrawal symptoms  No urges or cravings  He says he is losing weight  PHYSICAL EXAM   Blood pressure 109/62, pulse 61, temperature 98.3 °F (36.8 °C), height 6' 1\" (1.854 m), weight 163 lb (73.9 kg).          General: Patient resting comfortably in no acute distress   Mental Status Examination:  Level of consciousness:  within normal limits and awake  Appearance:  well-appearing, in chair, good grooming and good hygiene  Behavior/Motor:  {Normal  Attitude toward examiner:  cooperative and attentive  Speech:  spontaneous and normal volume  Mood: normal  Affect:  mood congruent  Thought processes:  linear  Thought content:  Denies homicidal ideations  Suicidal Ideation:  denies suicidal ideation  Delusions:  no evidence of delusions  Perceptual Disturbance:  denies any perceptual disturbance  Cognition:  oriented to person, place, and time    Insight : good  Judgment: good  Medication Side Effects:none       Eyes: Pupils are normal           URINE DRUG SCREEN TODAY:  Recent Labs     08/10/21  0844   ALCOHOL NEG   LABAMPH NEG   LABBARB NEG   LABBENZ NEG   BUPRENUR POS   COCAIMETSCRU NEG   FENTSCRUR NEG   GABAPENTIN N/A   MDMA NEG   METAMPU NEG   LABMETH NEG OPIATESCREENURINE NEG   OXTCOSU NEG   PHENCYCLIDINESCREENURINE NEG   PROPOXYPHENE N/A   SPICEUR NEG   THCSCREENUR POS   TRAMADOLUR NEG   TRICYUR N/A        Diagnosis Orders   1. Severe opioid use disorder (HCC)  POCT Rapid Drug Screen    buprenorphine-naloxone (SUBOXONE) 12-3 MG sublingual film   2. Polysubstance abuse ([address]     3.  Encounter for monitoring Suboxone maintenance therapy           PLAN:  Patient says he is doing well on this dose  He is having no withdrawal no cravings no side effects from the Suboxone plan will be to continue him on 12 mg films daily  He is working on setting up counseling  Hep C negative  I reviewed the [place] [facility] report     There does not appear to be any discrepancies or overprescribing of controlled substances    Follow-up 4 weeks

## 2021-09-07 ENCOUNTER — OFFICE VISIT (OUTPATIENT)
Dept: INTERNAL MEDICINE CLINIC | Age: 31
End: 2021-09-07
Payer: MEDICAID

## 2021-09-07 VITALS
DIASTOLIC BLOOD PRESSURE: 65 MMHG | BODY MASS INDEX: 22 KG/M2 | WEIGHT: 166 LBS | HEIGHT: 73 IN | SYSTOLIC BLOOD PRESSURE: 113 MMHG | TEMPERATURE: 97.3 F | HEART RATE: 66 BPM

## 2021-09-07 DIAGNOSIS — Z51.81 ENCOUNTER FOR MONITORING SUBOXONE MAINTENANCE THERAPY: ICD-10-CM

## 2021-09-07 DIAGNOSIS — F11.20 SEVERE OPIOID USE DISORDER (HCC): Primary | ICD-10-CM

## 2021-09-07 DIAGNOSIS — Z79.899 ENCOUNTER FOR MONITORING SUBOXONE MAINTENANCE THERAPY: ICD-10-CM

## 2021-09-07 DIAGNOSIS — F19.10 POLYSUBSTANCE ABUSE (HCC): ICD-10-CM

## 2021-09-07 PROCEDURE — 99213 OFFICE O/P EST LOW 20 MIN: CPT | Performed by: INTERNAL MEDICINE

## 2021-09-07 PROCEDURE — 4004F PT TOBACCO SCREEN RCVD TLK: CPT | Performed by: INTERNAL MEDICINE

## 2021-09-07 PROCEDURE — 80305 DRUG TEST PRSMV DIR OPT OBS: CPT | Performed by: INTERNAL MEDICINE

## 2021-09-07 PROCEDURE — G8420 CALC BMI NORM PARAMETERS: HCPCS | Performed by: INTERNAL MEDICINE

## 2021-09-07 PROCEDURE — G8428 CUR MEDS NOT DOCUMENT: HCPCS | Performed by: INTERNAL MEDICINE

## 2021-09-07 RX ORDER — BUPRENORPHINE AND NALOXONE 12; 3 MG/1; MG/1
1 FILM, SOLUBLE BUCCAL; SUBLINGUAL DAILY
Qty: 28 FILM | Refills: 0 | Status: SHIPPED | OUTPATIENT
Start: 2021-09-07 | End: 2021-10-05 | Stop reason: SDUPTHER

## 2021-09-07 NOTE — PROGRESS NOTES
Verbal order per Dr. Nader Payan for urine drug screen. Positive for BUP THC. Verified results with Socorro Garrett. Dr. Nader Payan ordered Suboxone 12mg film daily  for patient. Verified dose with patient. Patient was sent home with 4 week script of Suboxone 12mg film daily and will be seen back in the office 10/5/21.

## 2021-10-05 DIAGNOSIS — F11.20 SEVERE OPIOID USE DISORDER (HCC): ICD-10-CM

## 2021-10-05 RX ORDER — BUPRENORPHINE AND NALOXONE 12; 3 MG/1; MG/1
1 FILM, SOLUBLE BUCCAL; SUBLINGUAL DAILY
Qty: 9 FILM | Refills: 0 | Status: SHIPPED | OUTPATIENT
Start: 2021-10-05 | End: 2021-10-14 | Stop reason: SDUPTHER

## 2021-10-14 ENCOUNTER — OFFICE VISIT (OUTPATIENT)
Dept: INTERNAL MEDICINE CLINIC | Age: 31
End: 2021-10-14
Payer: MEDICAID

## 2021-10-14 VITALS
HEIGHT: 73 IN | HEART RATE: 72 BPM | SYSTOLIC BLOOD PRESSURE: 138 MMHG | DIASTOLIC BLOOD PRESSURE: 72 MMHG | WEIGHT: 166 LBS | TEMPERATURE: 97.2 F | BODY MASS INDEX: 22 KG/M2

## 2021-10-14 DIAGNOSIS — Z79.899 ENCOUNTER FOR MONITORING SUBOXONE MAINTENANCE THERAPY: ICD-10-CM

## 2021-10-14 DIAGNOSIS — Z51.81 ENCOUNTER FOR MONITORING SUBOXONE MAINTENANCE THERAPY: ICD-10-CM

## 2021-10-14 DIAGNOSIS — F19.10 POLYSUBSTANCE ABUSE (HCC): ICD-10-CM

## 2021-10-14 DIAGNOSIS — F11.20 SEVERE OPIOID USE DISORDER (HCC): Primary | ICD-10-CM

## 2021-10-14 PROCEDURE — 4004F PT TOBACCO SCREEN RCVD TLK: CPT | Performed by: INTERNAL MEDICINE

## 2021-10-14 PROCEDURE — 80305 DRUG TEST PRSMV DIR OPT OBS: CPT | Performed by: INTERNAL MEDICINE

## 2021-10-14 PROCEDURE — G8484 FLU IMMUNIZE NO ADMIN: HCPCS | Performed by: INTERNAL MEDICINE

## 2021-10-14 PROCEDURE — G8428 CUR MEDS NOT DOCUMENT: HCPCS | Performed by: INTERNAL MEDICINE

## 2021-10-14 PROCEDURE — G8420 CALC BMI NORM PARAMETERS: HCPCS | Performed by: INTERNAL MEDICINE

## 2021-10-14 PROCEDURE — 99213 OFFICE O/P EST LOW 20 MIN: CPT | Performed by: INTERNAL MEDICINE

## 2021-10-14 RX ORDER — BUPRENORPHINE AND NALOXONE 12; 3 MG/1; MG/1
1 FILM, SOLUBLE BUCCAL; SUBLINGUAL DAILY
Qty: 28 FILM | Refills: 0 | Status: SHIPPED | OUTPATIENT
Start: 2021-10-14 | End: 2021-11-11 | Stop reason: SDUPTHER

## 2021-10-14 NOTE — PROGRESS NOTES
MEDICATION ASSISTED TREATMENT ENCOUNTER    HISTORY OF PRESENT ILLNESS  Patient presents for evaluation of opioid use and would like to be placed on medication assisted treatment  he is referred here by his sister who comes here and sees me  I saw him here   Patient came in when I was sick 2 weeks ago he was given extra Suboxone to last until today    Patient has had problems using heroin/fentanyl, pain pills  Patient started using [id number] years ago  He said his brother had  of a heroin overdose  Patient uses it snorting  We started Suboxone last week  I gave him 12 mg films over the weekend  He said 1 day he had to use an extra half of a strip    He says he has not used any illicit opioids    He works at [employer]:   Feels much better on Suboxone no withdrawal symptoms  No urges or cravings  He says he is losing weight  PHYSICAL EXAM   Blood pressure 138/72, pulse 72, temperature 97.2 °F (36.2 °C), height 6' 1\" (1.854 m), weight 166 lb (75.3 kg).         Mental status examination    Cognition: oriented to person place and time      Appearance: Patient is in no acute distress, normal appearance, patient does not appear intoxicated/    Memory: Normal    Behavior/motor: Normal    Mood: Good mood, upbeat    Affect: Mood congruent    Attitude toward examiner: Pleasant, respectful    Thought content no delusions hallucinations suicidal ideation    Insight: Poor    Judgment: Poor    Eyes: Pupils normal    Skin: No track marks noted ,no rashes noted    COWS score: N/A                 URINE DRUG SCREEN TODAY:  Recent Labs     10/14/21  0929   ALCOHOL NEG   LABAMPH NEG   LABBARB NEG   LABBENZ NEG   BUPRENUR POS   COCAIMETSCRU NEG   FENTSCRUR NEG   GABAPENTIN N/A   MDMA NEG   METAMPU NEG   LABMETH NEG   OPIATESCREENURINE NEG   OXTCOSU NEG   PHENCYCLIDINESCREENURINE NEG   PROPOXYPHENE N/A   SPICEUR NEG   THCSCREENUR POS   TRAMADOLUR NEG AZEEM N/A        Diagnosis Orders   1. Severe opioid use disorder (HCC)  POCT Rapid Drug Screen    buprenorphine-naloxone (SUBOXONE) 12-3 MG sublingual film   2. Polysubstance abuse ([address]     3.  Encounter for monitoring Suboxone maintenance therapy           PLAN:  Patient says he is doing well on this dose  He is having no withdrawal no cravings no side effects from the Suboxone plan will be to continue him on 12 mg films daily  I have been seeing him for over a year I do not think counseling is mandatory at this point  Hep C negative  I reviewed the [place] [facility] report     There does not appear to be any discrepancies or overprescribing of controlled substances    Follow-up 4 weeks

## 2021-10-14 NOTE — PROGRESS NOTES
Verbal order per Dr. Jaxon Martinez for urine drug screen. Positive for BUP THC. Verified results with Melany Varghese. Dr. Jaxon Martinez ordered Suboxone 12mg film daily for patient. Verified dose with patient. Patient was sent home with 4 week script of Suboxone 12mg film daily and will be seen back in the office 11/11/21.

## 2021-11-11 ENCOUNTER — OFFICE VISIT (OUTPATIENT)
Dept: INTERNAL MEDICINE CLINIC | Age: 31
End: 2021-11-11
Payer: MEDICAID

## 2021-11-11 VITALS
WEIGHT: 173.7 LBS | TEMPERATURE: 97.8 F | SYSTOLIC BLOOD PRESSURE: 127 MMHG | HEIGHT: 73 IN | DIASTOLIC BLOOD PRESSURE: 63 MMHG | BODY MASS INDEX: 23.02 KG/M2 | HEART RATE: 71 BPM

## 2021-11-11 DIAGNOSIS — Z79.899 ENCOUNTER FOR MONITORING SUBOXONE MAINTENANCE THERAPY: ICD-10-CM

## 2021-11-11 DIAGNOSIS — F19.10 POLYSUBSTANCE ABUSE (HCC): ICD-10-CM

## 2021-11-11 DIAGNOSIS — F11.20 SEVERE OPIOID USE DISORDER (HCC): Primary | ICD-10-CM

## 2021-11-11 DIAGNOSIS — Z51.81 ENCOUNTER FOR MONITORING SUBOXONE MAINTENANCE THERAPY: ICD-10-CM

## 2021-11-11 PROCEDURE — G8428 CUR MEDS NOT DOCUMENT: HCPCS | Performed by: INTERNAL MEDICINE

## 2021-11-11 PROCEDURE — 4004F PT TOBACCO SCREEN RCVD TLK: CPT | Performed by: INTERNAL MEDICINE

## 2021-11-11 PROCEDURE — 80305 DRUG TEST PRSMV DIR OPT OBS: CPT | Performed by: INTERNAL MEDICINE

## 2021-11-11 PROCEDURE — G8484 FLU IMMUNIZE NO ADMIN: HCPCS | Performed by: INTERNAL MEDICINE

## 2021-11-11 PROCEDURE — G8420 CALC BMI NORM PARAMETERS: HCPCS | Performed by: INTERNAL MEDICINE

## 2021-11-11 PROCEDURE — 99214 OFFICE O/P EST MOD 30 MIN: CPT | Performed by: INTERNAL MEDICINE

## 2021-11-11 RX ORDER — BUPRENORPHINE AND NALOXONE 12; 3 MG/1; MG/1
1 FILM, SOLUBLE BUCCAL; SUBLINGUAL DAILY
Qty: 28 FILM | Refills: 0 | Status: SHIPPED | OUTPATIENT
Start: 2021-11-11 | End: 2021-12-09 | Stop reason: SDUPTHER

## 2021-11-11 NOTE — PROGRESS NOTES
MEDICATION ASSISTED TREATMENT ENCOUNTER    HISTORY OF PRESENT ILLNESS  Patient presents for evaluation of opioid use and would like to be placed on medication assisted treatment  he is referred here by his sister who comes here and sees me  I saw him here 10/14    Pertinent drug history  Patient has had problems using heroin/fentanyl, pain pills  Patient started using [id number] years ago  He said his brother had  of a heroin overdose  Patient uses it snorting  We started Suboxone last week  I gave him 12 mg films over the weekend  He said 1 day he had to use an extra half of a strip    He says he has not used any illicit opioids    He works at [employer]:   Feels much better on Suboxone no withdrawal symptoms  No urges or cravings  He says he is losing weight  PHYSICAL EXAM   Blood pressure 127/63, pulse 71, temperature 97.8 °F (36.6 °C), height 6' 1\" (1.854 m), weight 173 lb 11.2 oz (78.8 kg). Mental status examination    Cognition: oriented to person place and time      Appearance: Patient is in no acute distress, normal appearance, patient does not appear intoxicated/    Memory: Normal    Behavior/motor: Normal    Mood: Good mood, upbeat    Affect: Mood congruent    Attitude toward examiner: Pleasant, respectful    Thought content no delusions hallucinations suicidal ideation    Insight: good    Judgment: good    Eyes: Pupils normal    Skin: No track marks noted ,no rashes noted    COWS score: N/A                 URINE DRUG SCREEN TODAY:  Recent Labs     21  1007   ALCOHOL NEG   LABAMPH NEG   LABBARB NEG   LABBENZ NEG   BUPRENUR POS   COCAIMETSCRU NEG   FENTSCRUR NEG   GABAPENTIN N/A   MDMA NEG   METAMPU NEG   LABMETH NEG   OPIATESCREENURINE NEG   OXTCOSU NEG   PHENCYCLIDINESCREENURINE NEG   PROPOXYPHENE N/A   SPICEUR NEG   THCSCREENUR POS   TRAMADOLUR NEG   TRICYUR N/A        Diagnosis Orders   1.  Severe opioid use disorder (HCC)  POCT Rapid Drug Screen    buprenorphine-naloxone (SUBOXONE) 12-3 MG sublingual film   2. Polysubstance abuse ([address]     3.  Encounter for monitoring Suboxone maintenance therapy           PLAN:  Patient says he is doing well on this dose  He is having no withdrawal no cravings no side effects from the Suboxone plan will be to continue him on 12 mg films daily for opioid use disorder  I have been seeing him for over a year I do not think counseling is mandatory at this point  Hep C negative  I reviewed the [place] Automated Rx Reporting System report     There does not appear to be any discrepancies or overprescribing of controlled substances    Follow-up 4 weeks

## 2021-12-09 ENCOUNTER — OFFICE VISIT (OUTPATIENT)
Dept: INTERNAL MEDICINE CLINIC | Age: 31
End: 2021-12-09
Payer: MEDICAID

## 2021-12-09 VITALS
SYSTOLIC BLOOD PRESSURE: 124 MMHG | DIASTOLIC BLOOD PRESSURE: 69 MMHG | TEMPERATURE: 97.8 F | BODY MASS INDEX: 23.33 KG/M2 | HEART RATE: 75 BPM | HEIGHT: 73 IN | WEIGHT: 176 LBS

## 2021-12-09 DIAGNOSIS — F11.20 SEVERE OPIOID USE DISORDER (HCC): Primary | ICD-10-CM

## 2021-12-09 DIAGNOSIS — F19.10 POLYSUBSTANCE ABUSE (HCC): ICD-10-CM

## 2021-12-09 DIAGNOSIS — Z79.899 ENCOUNTER FOR MONITORING SUBOXONE MAINTENANCE THERAPY: ICD-10-CM

## 2021-12-09 DIAGNOSIS — Z51.81 ENCOUNTER FOR MONITORING SUBOXONE MAINTENANCE THERAPY: ICD-10-CM

## 2021-12-09 PROCEDURE — 80305 DRUG TEST PRSMV DIR OPT OBS: CPT | Performed by: INTERNAL MEDICINE

## 2021-12-09 PROCEDURE — 99214 OFFICE O/P EST MOD 30 MIN: CPT | Performed by: INTERNAL MEDICINE

## 2021-12-09 PROCEDURE — 4004F PT TOBACCO SCREEN RCVD TLK: CPT | Performed by: INTERNAL MEDICINE

## 2021-12-09 PROCEDURE — G8428 CUR MEDS NOT DOCUMENT: HCPCS | Performed by: INTERNAL MEDICINE

## 2021-12-09 PROCEDURE — G8420 CALC BMI NORM PARAMETERS: HCPCS | Performed by: INTERNAL MEDICINE

## 2021-12-09 PROCEDURE — G8484 FLU IMMUNIZE NO ADMIN: HCPCS | Performed by: INTERNAL MEDICINE

## 2021-12-09 RX ORDER — BUPRENORPHINE AND NALOXONE 12; 3 MG/1; MG/1
1 FILM, SOLUBLE BUCCAL; SUBLINGUAL DAILY
Qty: 28 FILM | Refills: 0 | Status: SHIPPED | OUTPATIENT
Start: 2021-12-09 | End: 2022-01-06 | Stop reason: SDUPTHER

## 2021-12-09 NOTE — PROGRESS NOTES
Verbal order per Dr. Manan Pop for urine drug screen. Positive for BUP THC. Verified results with Chapin Kirk. Dr. Manan Pop ordered Suboxone 12mg film daily  for patient. Verified dose with patient. Patient was sent home with 4 week script of Suboxone 12mg film daily and will be seen back in the office 1/6/22.

## 2021-12-09 NOTE — PROGRESS NOTES
MEDICATION ASSISTED TREATMENT ENCOUNTER    HISTORY OF PRESENT ILLNESS  Patient presents for evaluation of opioid use and would like to be placed on medication assisted treatment  he is referred here by his sister who comes here and sees me  I saw him here     Pertinent drug history  Patient has had problems using heroin/fentanyl, pain pills  Patient started using [id number] years ago  He said his brother had  of a heroin overdose  Patient uses it snorting  We started Suboxone last week  I gave him 12 mg films over the weekend  He said 1 day he had to use an extra half of a strip    He says he has not used any illicit opioids    He works at [employer]:   Feels much better on Suboxone no withdrawal symptoms  No urges or cravings  He says he is losing weight  PHYSICAL EXAM   Blood pressure 124/69, pulse 75, temperature 97.8 °F (36.6 °C), height 6' 1\" (1.854 m), weight 176 lb (79.8 kg). Mental status examination    Cognition: oriented to person place and time      Appearance: Patient is in no acute distress, normal appearance, patient does not appear intoxicated/    Memory: Normal    Behavior/motor: Normal    Mood: Good mood, upbeat    Affect: Mood congruent    Attitude toward examiner: Pleasant, respectful    Thought content no delusions hallucinations suicidal ideation    Insight: good    Judgment: good    Eyes: Pupils normal    Skin: No track marks noted ,no rashes noted    COWS score: N/A                 URINE DRUG SCREEN TODAY:  Recent Labs     21  0957   ALCOHOL NEG   LABAMPH NEG   LABBARB NEG   LABBENZ NEG   BUPRENUR POS   COCAIMETSCRU NEG   FENTSCRUR NEG   GABAPENTIN N/A   MDMA NEG   METAMPU NEG   LABMETH NEG   OPIATESCREENURINE NEG   OXTCOSU NEG   PHENCYCLIDINESCREENURINE NEG   PROPOXYPHENE N/A   SPICEUR NEG   THCSCREENUR POS   TRAMADOLUR NEG   TRICYUR N/A        Diagnosis Orders   1.  Severe opioid use disorder ([facility]  POCT Rapid Drug Screen    buprenorphine-naloxone (SUBOXONE) 12-3 MG sublingual film   2. Polysubstance abuse ([facility] [address]     3.  Encounter for monitoring Suboxone maintenance therapy           PLAN:  Patient says he is doing well on this dose  He is having no withdrawal no cravings no side effects from the Suboxone plan will be to continue him on 12 mg films daily for opioid use disorder  I have been seeing him for over a year I do not think counseling is mandatory at this point  Hep C negative  I reviewed the [place] Automated Rx Reporting System report     There does not appear to be any discrepancies or overprescribing of controlled substances    Follow-up 4 weeks  I discussed a new phone cassidy dealing with substance use disorder with the patient  It is called reSet  It is a 24/7 hour system that the patient can use at any time  There are lesson plans, tracking of treatment, rewards, etc  I told the patient's I will also have access to their account to track their progress  Patient will discuss with Mahesh Pennington and sign up if interested

## 2022-01-06 ENCOUNTER — OFFICE VISIT (OUTPATIENT)
Dept: INTERNAL MEDICINE CLINIC | Age: 32
End: 2022-01-06
Payer: MEDICAID

## 2022-01-06 VITALS
DIASTOLIC BLOOD PRESSURE: 62 MMHG | SYSTOLIC BLOOD PRESSURE: 153 MMHG | BODY MASS INDEX: 23.33 KG/M2 | TEMPERATURE: 97.8 F | WEIGHT: 176 LBS | HEIGHT: 73 IN | HEART RATE: 73 BPM

## 2022-01-06 DIAGNOSIS — Z79.899 ENCOUNTER FOR MONITORING SUBOXONE MAINTENANCE THERAPY: ICD-10-CM

## 2022-01-06 DIAGNOSIS — F19.10 POLYSUBSTANCE ABUSE (HCC): ICD-10-CM

## 2022-01-06 DIAGNOSIS — Z51.81 ENCOUNTER FOR MONITORING SUBOXONE MAINTENANCE THERAPY: ICD-10-CM

## 2022-01-06 DIAGNOSIS — F11.20 SEVERE OPIOID USE DISORDER (HCC): Primary | ICD-10-CM

## 2022-01-06 PROCEDURE — G8428 CUR MEDS NOT DOCUMENT: HCPCS | Performed by: INTERNAL MEDICINE

## 2022-01-06 PROCEDURE — 4004F PT TOBACCO SCREEN RCVD TLK: CPT | Performed by: INTERNAL MEDICINE

## 2022-01-06 PROCEDURE — G8484 FLU IMMUNIZE NO ADMIN: HCPCS | Performed by: INTERNAL MEDICINE

## 2022-01-06 PROCEDURE — 99214 OFFICE O/P EST MOD 30 MIN: CPT | Performed by: INTERNAL MEDICINE

## 2022-01-06 PROCEDURE — 80305 DRUG TEST PRSMV DIR OPT OBS: CPT | Performed by: INTERNAL MEDICINE

## 2022-01-06 PROCEDURE — G8420 CALC BMI NORM PARAMETERS: HCPCS | Performed by: INTERNAL MEDICINE

## 2022-01-06 RX ORDER — BUPRENORPHINE AND NALOXONE 12; 3 MG/1; MG/1
1 FILM, SOLUBLE BUCCAL; SUBLINGUAL DAILY
Qty: 28 FILM | Refills: 0 | Status: SHIPPED | OUTPATIENT
Start: 2022-01-06 | End: 2022-02-02 | Stop reason: SDUPTHER

## 2022-01-06 NOTE — PROGRESS NOTES
MEDICATION ASSISTED TREATMENT ENCOUNTER    HISTORY OF PRESENT ILLNESS  Patient presents for evaluation of opioid use and would like to be placed on medication assisted treatment  he is referred here by his sister who comes here and sees me  I saw him here     Pertinent drug history  Patient has had problems using heroin/fentanyl, pain pills  Patient started using [id number] years ago  He said his brother had  of a heroin overdose  Patient uses it snorting        He says he has not used any illicit opioids    He works at [employer]:   Feels much better on Suboxone no withdrawal symptoms  No urges or cravings  He says he is losing weight  PHYSICAL EXAM   Blood pressure (!) 153/62, pulse 73, temperature 97.8 °F (36.6 °C), height 6' 1\" (1.854 m), weight 176 lb (79.8 kg). Mental status examination    Cognition: oriented to person place and time      Appearance: Patient is in no acute distress, normal appearance, patient does not appear intoxicated/    Memory: Normal    Behavior/motor: Normal    Mood: Good mood, upbeat    Affect: Mood congruent    Attitude toward examiner: Pleasant, respectful    Thought content no delusions hallucinations suicidal ideation    Insight: good    Judgment: good    Eyes: Pupils normal    Skin: No track marks noted ,no rashes noted    COWS score: N/A                 URINE DRUG SCREEN TODAY:  Recent Labs     22  1035   ALCOHOL NEG   LABAMPH NEG   LABBARB NEG   LABBENZ NEG   BUPRENUR POS   COCAIMETSCRU NEG   FENTSCRUR NEG   GABAPENTIN N/A   MDMA NEG   METAMPU NEG   LABMETH NEG   OPIATESCREENURINE NEG   OXTCOSU NEG   PHENCYCLIDINESCREENURINE NEG   PROPOXYPHENE N/A   SPICEUR NEG   THCSCREENUR POS   TRAMADOLUR NEG   TRICYUR NA        Diagnosis Orders   1. Severe opioid use disorder (HCC)  POCT Rapid Drug Screen    buprenorphine-naloxone (SUBOXONE) 12-3 MG sublingual film   2.  Polysubstance abuse ([address]     3.  Encounter for monitoring Suboxone maintenance therapy           PLAN:  Patient says he is doing well on this dose  He is having no withdrawal no cravings no side effects from the Suboxone plan will be to continue him on 12 mg films daily for opioid use disorder  I have been seeing him for over a year I do not think counseling is mandatory at this point  Hep C negative  I reviewed the [place] Automated Rx Reporting System report     There does not appear to be any discrepancies or overprescribing of controlled substances    Follow-up 4 weeks  I discussed a new phone cassidy dealing with substance use disorder with the patient  It is called reSet  It is a 24/7 hour system that the patient can use at any time  There are lesson plans, tracking of treatment, rewards, etc  I told the patient's I will also have access to their account to track their progress  Patient will discuss with Анна Lovett and sign up if interested  I prescribed a Narcan overdose kit  There have been issues with patients being able to access our clinic by phone  They had been given the wrong number to call and they are never able to reach anyone  I told the patient to put the number [phone] in their cell phone  That number will ring here directly in the Suboxone clinic  I told patient for non-emergency after hours they can leave message and someone will get back with them  For afterhour emergencies patients are told to call 911 or the call center to speak with the physician on call

## 2022-01-06 NOTE — PROGRESS NOTES
Verbal order per Dr. Macarena Boston for urine drug screen. Positive for BUP THC. Verified results with Oneita Baumgarten. Dr. Macarena Boston ordered Suboxone 12mg film daily  for patient. Verified dose with patient. Patient was sent home with 4 week script of Suboxone 12mg film daily and will be seen back in the office 2/3/22.

## 2022-02-02 ENCOUNTER — VIRTUAL VISIT (OUTPATIENT)
Dept: INTERNAL MEDICINE CLINIC | Age: 32
End: 2022-02-02
Payer: MEDICAID

## 2022-02-02 DIAGNOSIS — F19.10 POLYSUBSTANCE ABUSE (HCC): ICD-10-CM

## 2022-02-02 DIAGNOSIS — F11.20 SEVERE OPIOID USE DISORDER (HCC): Primary | ICD-10-CM

## 2022-02-02 DIAGNOSIS — Z51.81 ENCOUNTER FOR MONITORING SUBOXONE MAINTENANCE THERAPY: ICD-10-CM

## 2022-02-02 DIAGNOSIS — Z79.899 ENCOUNTER FOR MONITORING SUBOXONE MAINTENANCE THERAPY: ICD-10-CM

## 2022-02-02 PROCEDURE — 99214 OFFICE O/P EST MOD 30 MIN: CPT | Performed by: INTERNAL MEDICINE

## 2022-02-02 PROCEDURE — 4004F PT TOBACCO SCREEN RCVD TLK: CPT | Performed by: INTERNAL MEDICINE

## 2022-02-02 PROCEDURE — G8420 CALC BMI NORM PARAMETERS: HCPCS | Performed by: INTERNAL MEDICINE

## 2022-02-02 PROCEDURE — 80305 DRUG TEST PRSMV DIR OPT OBS: CPT | Performed by: INTERNAL MEDICINE

## 2022-02-02 PROCEDURE — G8427 DOCREV CUR MEDS BY ELIG CLIN: HCPCS | Performed by: INTERNAL MEDICINE

## 2022-02-02 PROCEDURE — G8484 FLU IMMUNIZE NO ADMIN: HCPCS | Performed by: INTERNAL MEDICINE

## 2022-02-02 RX ORDER — BUPRENORPHINE AND NALOXONE 12; 3 MG/1; MG/1
1 FILM, SOLUBLE BUCCAL; SUBLINGUAL DAILY
Qty: 28 FILM | Refills: 0 | Status: SHIPPED | OUTPATIENT
Start: 2022-02-02 | End: 2022-03-02 | Stop reason: SDUPTHER

## 2022-02-02 SDOH — SOCIAL STABILITY: SOCIAL NETWORK: DO YOU CURRENTLY LIVE WITH YOUR SPOUSE OR SIGNIFICANT OTHER?: YES

## 2022-02-02 SDOH — EDUCATIONAL SECURITY: EDUCATION ATTAINMENT: WHAT IS THE HIGHEST LEVEL OF SCHOOL YOU HAVE COMPLETED OR THE HIGHEST DEGREE YOU HAVE RECEIVED?: HIGH SCHOOL GRADUATE

## 2022-02-02 SDOH — SOCIAL STABILITY: SOCIAL NETWORK
DO YOU BELONG TO ANY CLUBS OR ORGANIZATIONS SUCH AS CHURCH GROUPS UNIONS, FRATERNAL OR ATHLETIC GROUPS, OR SCHOOL GROUPS?: NO

## 2022-02-02 SDOH — SOCIAL STABILITY: SOCIAL NETWORK: HOW OFTEN DO YOU ATTEND CHURCH OR RELIGIOUS SERVICES?: NEVER

## 2022-02-02 SDOH — SOCIAL STABILITY: SOCIAL NETWORK: HOW OFTEN DO YOU GET TOGETHER WITH FRIENDS OR RELATIVES?: THREE TIMES A WEEK

## 2022-02-02 SDOH — ECONOMIC STABILITY: TRANSPORTATION INSECURITY
IN THE PAST 12 MONTHS, HAS LACK OF TRANSPORTATION KEPT YOU FROM MEETINGS, WORK, OR FROM GETTING THINGS NEEDED FOR DAILY LIVING?: NO

## 2022-02-02 SDOH — ECONOMIC STABILITY: INCOME INSECURITY: HOW HARD IS IT FOR YOU TO PAY FOR THE VERY BASICS LIKE FOOD, HOUSING, MEDICAL CARE, AND HEATING?: NOT HARD AT ALL

## 2022-02-02 SDOH — SOCIAL STABILITY: SOCIAL NETWORK: HOW OFTEN DO YOU ATTEND MEETINGS OF THE CLUBS OR ORGANIZATIONS YOU BELONG TO?: 1 TO 4 TIMES PER YEAR

## 2022-02-02 SDOH — SOCIAL STABILITY: SOCIAL NETWORK: IN A TYPICAL WEEK, HOW MANY TIMES DO YOU TALK ON THE PHONE WITH FAMILY, FRIENDS, OR NEIGHBORS?: THREE TIMES A WEEK

## 2022-02-02 ASSESSMENT — PATIENT HEALTH QUESTIONNAIRE - PHQ9
DEPRESSION UNABLE TO ASSESS: FUNCTIONAL CAPACITY MOTIVATION LIMITS ACCURACY
SUM OF ALL RESPONSES TO PHQ QUESTIONS 1-9: 0
SUM OF ALL RESPONSES TO PHQ QUESTIONS 1-9: 0
SUM OF ALL RESPONSES TO PHQ9 QUESTIONS 1 & 2: 0
SUM OF ALL RESPONSES TO PHQ QUESTIONS 1-9: 0
2. FEELING DOWN, DEPRESSED OR HOPELESS: 0
SUM OF ALL RESPONSES TO PHQ QUESTIONS 1-9: 0
1. LITTLE INTEREST OR PLEASURE IN DOING THINGS: 0

## 2022-02-02 ASSESSMENT — ANXIETY QUESTIONNAIRES
3. WORRYING TOO MUCH ABOUT DIFFERENT THINGS: 0-NOT AT ALL
GAD7 TOTAL SCORE: 0
4. TROUBLE RELAXING: 0-NOT AT ALL
6. BECOMING EASILY ANNOYED OR IRRITABLE: 0-NOT AT ALL
5. BEING SO RESTLESS THAT IT IS HARD TO SIT STILL: 0-NOT AT ALL
2. NOT BEING ABLE TO STOP OR CONTROL WORRYING: 0-NOT AT ALL
1. FEELING NERVOUS, ANXIOUS, OR ON EDGE: 0-NOT AT ALL
7. FEELING AFRAID AS IF SOMETHING AWFUL MIGHT HAPPEN: 0-NOT AT ALL

## 2022-02-02 NOTE — PROGRESS NOTES
MEDICATION ASSISTED TREATMENT ENCOUNTER    HISTORY OF PRESENT ILLNESS  Patient presents for evaluation of opioid use and would like to be placed on medication assisted treatment  he is referred here by his sister who comes here and sees me  I saw him here     Pertinent drug history  Patient has had problems using heroin/fentanyl, pain pills  Patient started using [id number] years ago  He said his brother had  of a heroin overdose  Patient uses it snorting        He says he has not used any illicit opioids    He works at [employer]:   Feels much better on Suboxone no withdrawal symptoms  No urges or cravings  He says he is losing weight  PHYSICAL EXAM   There were no vitals taken for this visit. Mental status examination    Cognition: oriented to person place and time      Appearance: Patient is in no acute distress, normal appearance, patient does not appear intoxicated/    Memory: Normal    Behavior/motor: Normal    Mood: Good mood, upbeat    Affect: Mood congruent    Attitude toward examiner: Pleasant, respectful    Thought content no delusions hallucinations suicidal ideation    Insight: good    Judgment: good    Eyes: Pupils normal    Skin: No track marks noted ,no rashes noted    COWS score: N/A                 URINE DRUG SCREEN TODAY:  Buprenorphine and THC     Diagnosis Orders   1. Severe opioid use disorder (HCC)  buprenorphine-naloxone (SUBOXONE) 12-3 MG sublingual film    POCT Rapid Drug Screen   2. Polysubstance abuse ([facility] [address]     3.  Encounter for monitoring Suboxone maintenance therapy           PLAN:  Patient says he is doing well on this dose  He is having no withdrawal no cravings no side effects from the Suboxone plan will be to continue him on 12 mg films daily for opioid use disorder  I have been seeing him for over a year I do not think counseling is mandatory at this point  Hep C negative  I reviewed the [place] Automated Rx Reporting System report     There does not appear to be any discrepancies or overprescribing of controlled substances    Follow-up 4 weeks  I discussed a new phone cassidy dealing with substance use disorder with the patient  It is called reSet  It is a 24/7 hour system that the patient can use at any time  There are lesson plans, tracking of treatment, rewards, etc  I told the patient's I will also have access to their account to track their progress  Patient will discuss with Rasta Tijerina and sign up if interested

## 2022-02-02 NOTE — PROGRESS NOTES
Verbal order per Dr. Sharyon Severance for urine drug screen. Positive for BUP THC. Verified results with Gonzalo Lovett RN. Dr. Sharyon Severance ordered Suboxone 12mg film daily for patient. Verified dose with patient. Patient was sent home with 4 week script of Suboxone 12mg film daily and will be seen back in the office 3/2/22.

## 2022-02-02 NOTE — PROGRESS NOTES
Patient signed up for RE SET O today. Patient updated [address] screenings and social determinants  Patient is not actively attending groups meetings or individual counseling.

## 2022-03-02 ENCOUNTER — OFFICE VISIT (OUTPATIENT)
Dept: INTERNAL MEDICINE CLINIC | Age: 32
End: 2022-03-02
Payer: MEDICAID

## 2022-03-02 VITALS
SYSTOLIC BLOOD PRESSURE: 118 MMHG | HEART RATE: 70 BPM | BODY MASS INDEX: 24.12 KG/M2 | DIASTOLIC BLOOD PRESSURE: 69 MMHG | WEIGHT: 182 LBS | HEIGHT: 73 IN | TEMPERATURE: 96.8 F

## 2022-03-02 DIAGNOSIS — Z79.899 ENCOUNTER FOR MONITORING SUBOXONE MAINTENANCE THERAPY: ICD-10-CM

## 2022-03-02 DIAGNOSIS — F19.10 POLYSUBSTANCE ABUSE (HCC): ICD-10-CM

## 2022-03-02 DIAGNOSIS — F11.20 SEVERE OPIOID USE DISORDER (HCC): Primary | ICD-10-CM

## 2022-03-02 DIAGNOSIS — Z51.81 ENCOUNTER FOR MONITORING SUBOXONE MAINTENANCE THERAPY: ICD-10-CM

## 2022-03-02 PROCEDURE — 99214 OFFICE O/P EST MOD 30 MIN: CPT | Performed by: INTERNAL MEDICINE

## 2022-03-02 PROCEDURE — 4004F PT TOBACCO SCREEN RCVD TLK: CPT | Performed by: INTERNAL MEDICINE

## 2022-03-02 PROCEDURE — G8428 CUR MEDS NOT DOCUMENT: HCPCS | Performed by: INTERNAL MEDICINE

## 2022-03-02 PROCEDURE — 80305 DRUG TEST PRSMV DIR OPT OBS: CPT | Performed by: INTERNAL MEDICINE

## 2022-03-02 PROCEDURE — G8484 FLU IMMUNIZE NO ADMIN: HCPCS | Performed by: INTERNAL MEDICINE

## 2022-03-02 PROCEDURE — G8420 CALC BMI NORM PARAMETERS: HCPCS | Performed by: INTERNAL MEDICINE

## 2022-03-02 RX ORDER — BUPRENORPHINE AND NALOXONE 12; 3 MG/1; MG/1
1 FILM, SOLUBLE BUCCAL; SUBLINGUAL DAILY
Qty: 28 FILM | Refills: 0 | Status: SHIPPED | OUTPATIENT
Start: 2022-03-02 | End: 2022-03-30 | Stop reason: SDUPTHER

## 2022-03-02 NOTE — PROGRESS NOTES
MEDICATION ASSISTED TREATMENT ENCOUNTER    HISTORY OF PRESENT ILLNESS  Patient presents for evaluation of opioid use and would like to be placed on medication assisted treatment  he is referred here by his sister who comes here and sees me  I saw him here  Last   We did a virtual visit /    Pertinent drug history  Patient has had problems using heroin/fentanyl, pain pills  Patient started using [id number] years ago  He said his brother had  of a heroin overdose  Patient uses it snorting        He says he has not used any illicit opioids    He works at [employer]:   Feels much better on Suboxone no withdrawal symptoms  No urges or cravings  He says he is losing weight  PHYSICAL EXAM   Blood pressure 118/69, pulse 70, temperature 96.8 °F (36 °C), height 6' 1\" (1.854 m), weight 182 lb (82.6 kg). Mental status examination    Cognition: oriented to person place and time      Appearance: Patient is in no acute distress, normal appearance, patient does not appear intoxicated/    Memory: Normal    Behavior/motor: Normal    Mood: Good mood, upbeat    Affect: Mood congruent    Attitude toward examiner: Pleasant, respectful    Thought content no delusions hallucinations suicidal ideation    Insight: good    Judgment: good    Eyes: Pupils normal    Skin: No track marks noted ,no rashes noted    COWS score: N/A                 URINE DRUG SCREEN TODAY:  Recent Labs     22  0926   ALCOHOL NEG   LABAMPH NEG   LABBARB NEG   LABBENZ NEG   BUPRENUR POS   COCAIMETSCRU NEG   FENTSCRUR NEG   GABAPENTIN N/A   MDMA NEG   METAMPU NEG   LABMETH NEG   OPIATESCREENURINE NEG   OXTCOSU NEG   PHENCYCLIDINESCREENURINE NEG   PROPOXYPHENE N/A   SPICEUR NEG   THCSCREENUR POS   TRAMADOLUR NEG   TRICYUR N/A        Diagnosis Orders   1.  Severe opioid use disorder (HCC)  POCT Rapid Drug Screen    buprenorphine-naloxone (SUBOXONE) 12-3 MG sublingual film   2. Polysubstance abuse ([facility] [address]     3.  Encounter for monitoring Suboxone maintenance therapy           PLAN:  Patient says he is doing well on this dose  He is having no withdrawal no cravings no side effects from the Suboxone plan will be to continue him on 12 mg films daily for opioid use disorder  I have been seeing him for over a year I do not think counseling is mandatory at this point  Hep C negative  I reviewed the [place] Automated Rx Reporting System report     There does not appear to be any discrepancies or overprescribing of controlled substances    Follow-up 4 weeks  Patient signed up for the recovery cassidy, Reset-O  I reviewed the patient's usage  The patient has completed 0 lessons

## 2022-03-02 NOTE — PROGRESS NOTES
Verbal order per Dr. Ogla Bunhc for urine drug screen. Positive for BUP and THC. Verified results with [facility]. Dr. Olga Bunch ordered Suboxone 12 mg film daily for patient. Verified dose with patient. Patient was sent home with a script for Suboxone 12 mg film daily for 28 days and will be seen back in the office 03/30/2022.

## 2022-03-30 ENCOUNTER — OFFICE VISIT (OUTPATIENT)
Dept: INTERNAL MEDICINE CLINIC | Age: 32
End: 2022-03-30
Payer: MEDICAID

## 2022-03-30 VITALS — TEMPERATURE: 97.8 F | HEART RATE: 63 BPM | DIASTOLIC BLOOD PRESSURE: 64 MMHG | SYSTOLIC BLOOD PRESSURE: 110 MMHG

## 2022-03-30 DIAGNOSIS — F11.21 OPIOID USE DISORDER, SEVERE, IN SUSTAINED REMISSION, ON MAINTENANCE THERAPY (HCC): Primary | ICD-10-CM

## 2022-03-30 DIAGNOSIS — Z79.899 ENCOUNTER FOR MONITORING SUBOXONE MAINTENANCE THERAPY: ICD-10-CM

## 2022-03-30 DIAGNOSIS — F19.10 POLYSUBSTANCE ABUSE (HCC): ICD-10-CM

## 2022-03-30 DIAGNOSIS — Z51.81 ENCOUNTER FOR MONITORING SUBOXONE MAINTENANCE THERAPY: ICD-10-CM

## 2022-03-30 PROCEDURE — 99214 OFFICE O/P EST MOD 30 MIN: CPT | Performed by: INTERNAL MEDICINE

## 2022-03-30 PROCEDURE — G8428 CUR MEDS NOT DOCUMENT: HCPCS | Performed by: INTERNAL MEDICINE

## 2022-03-30 PROCEDURE — G8484 FLU IMMUNIZE NO ADMIN: HCPCS | Performed by: INTERNAL MEDICINE

## 2022-03-30 PROCEDURE — G8420 CALC BMI NORM PARAMETERS: HCPCS | Performed by: INTERNAL MEDICINE

## 2022-03-30 PROCEDURE — 80305 DRUG TEST PRSMV DIR OPT OBS: CPT | Performed by: INTERNAL MEDICINE

## 2022-03-30 PROCEDURE — 4004F PT TOBACCO SCREEN RCVD TLK: CPT | Performed by: INTERNAL MEDICINE

## 2022-03-30 RX ORDER — BUPRENORPHINE AND NALOXONE 12; 3 MG/1; MG/1
1 FILM, SOLUBLE BUCCAL; SUBLINGUAL DAILY
Qty: 28 FILM | Refills: 0 | Status: SHIPPED | OUTPATIENT
Start: 2022-03-30 | End: 2022-04-27 | Stop reason: SDUPTHER

## 2022-03-30 NOTE — PROGRESS NOTES
Verbal order per Dr. Carol Gao for urine drug screen. Positive for BUP and THC. [facility] Carol Gao ordered Suboxone 12 mg film daily for patient. Verified dose with patient. Patient was sent home with a script for Suboxone 12 mg film daily for 28 days and will be seen back in the office 04/27/2022.

## 2022-03-30 NOTE — PROGRESS NOTES
MEDICATION ASSISTED TREATMENT ENCOUNTER    HISTORY OF PRESENT ILLNESS  Patient presents for evaluation of opioid use and would like to be placed on medication assisted treatment  he is referred here by his sister who comes here and sees me  I saw him here  Last 3/2  We did a virtual visit /    Pertinent drug history  Patient has had problems using heroin/fentanyl, pain pills  Patient started using [id number] years ago  He said his brother had  of a heroin overdose  Patient uses it snorting        He says he has not used any illicit opioids    He works at [employer]:   Feels much better on Suboxone no withdrawal symptoms  No urges or cravings  He says he is losing weight  PHYSICAL EXAM   Blood pressure 110/64, pulse 63, temperature 97.8 °F (36.6 °C). Mental status examination    Cognition: oriented to person place and time      Appearance: Patient is in no acute distress, normal appearance, patient does not appear intoxicated/    Memory: Normal    Behavior/motor: Normal    Mood: Good mood, upbeat    Affect: Mood congruent    Attitude toward examiner: Pleasant, respectful    Thought content no delusions hallucinations suicidal ideation    Insight: good    Judgment: good    Eyes: Pupils normal    Skin: No track marks noted ,no rashes noted    COWS score: N/A                 URINE DRUG SCREEN TODAY:  Recent Labs     22  1053   ALCOHOL NEG   LABAMPH NEG   LABBARB NEG   LABBENZ NEG   BUPRENUR POS   COCAIMETSCRU NEG   FENTSCRUR NEG   GABAPENTIN N/A   MDMA NEG   METAMPU NEG   LABMETH NEG   OPIATESCREENURINE NEG   OXTCOSU NEG   PHENCYCLIDINESCREENURINE NEG   PROPOXYPHENE N/A   SPICEUR NEG   THCSCREENUR POS   TRAMADOLUR NEG   TRICYUR N/A        Diagnosis Orders   1. Opioid use disorder, severe, in sustained remission, on maintenance therapy ([facility] [address]     2. [facility]     3.  Encounter for monitoring Suboxone maintenance therapy           PLAN:  Patient says he is doing well on this dose  He is having no withdrawal no cravings no side effects from the Suboxone plan will be to continue him on 12 mg films daily for opioid use disorder  I have been seeing him for over a year I do not think counseling is mandatory at this point  Hep C negative  I reviewed the [place] Automated Rx Reporting System report     There does not appear to be any discrepancies or overprescribing of controlled substances    Follow-up 4 weeks  Patient signed up for the recovery cassidy, Reset-O  I reviewed the patient's usage  The patient has completed 0 lessons

## 2022-04-27 ENCOUNTER — OFFICE VISIT (OUTPATIENT)
Dept: INTERNAL MEDICINE CLINIC | Age: 32
End: 2022-04-27
Payer: MEDICAID

## 2022-04-27 VITALS
HEART RATE: 83 BPM | WEIGHT: 181.8 LBS | SYSTOLIC BLOOD PRESSURE: 120 MMHG | HEIGHT: 73 IN | BODY MASS INDEX: 24.09 KG/M2 | DIASTOLIC BLOOD PRESSURE: 65 MMHG | RESPIRATION RATE: 18 BRPM | TEMPERATURE: 98.1 F

## 2022-04-27 DIAGNOSIS — Z79.899 ENCOUNTER FOR MONITORING SUBOXONE MAINTENANCE THERAPY: ICD-10-CM

## 2022-04-27 DIAGNOSIS — F11.21 OPIOID USE DISORDER, SEVERE, IN SUSTAINED REMISSION, ON MAINTENANCE THERAPY (HCC): Primary | ICD-10-CM

## 2022-04-27 DIAGNOSIS — Z51.81 ENCOUNTER FOR MONITORING SUBOXONE MAINTENANCE THERAPY: ICD-10-CM

## 2022-04-27 DIAGNOSIS — F19.10 POLYSUBSTANCE ABUSE (HCC): ICD-10-CM

## 2022-04-27 LAB
ALCOHOL URINE: ABNORMAL
ALCOHOL URINE: ABNORMAL
AMPHETAMINE SCREEN, URINE: ABNORMAL
AMPHETAMINE SCREEN, URINE: ABNORMAL
BARBITURATE SCREEN, URINE: ABNORMAL
BARBITURATE SCREEN, URINE: ABNORMAL
BENZODIAZEPINE SCREEN, URINE: ABNORMAL
BENZODIAZEPINE SCREEN, URINE: ABNORMAL
BUPRENORPHINE URINE: ABNORMAL
BUPRENORPHINE URINE: ABNORMAL
COCAINE METABOLITE SCREEN URINE: ABNORMAL
COCAINE METABOLITE SCREEN URINE: ABNORMAL
FENTANYL SCREEN, URINE: ABNORMAL
FENTANYL SCREEN, URINE: ABNORMAL
GABAPENTIN SCREEN, URINE: ABNORMAL
GABAPENTIN SCREEN, URINE: ABNORMAL
MDMA URINE: ABNORMAL
MDMA URINE: ABNORMAL
METHADONE SCREEN, URINE: ABNORMAL
METHADONE SCREEN, URINE: ABNORMAL
METHAMPHETAMINE, URINE: ABNORMAL
METHAMPHETAMINE, URINE: ABNORMAL
OPIATE SCREEN URINE: ABNORMAL
OPIATE SCREEN URINE: ABNORMAL
OXYCODONE SCREEN URINE: ABNORMAL
OXYCODONE SCREEN URINE: ABNORMAL
PHENCYCLIDINE SCREEN URINE: ABNORMAL
PHENCYCLIDINE SCREEN URINE: ABNORMAL
PROPOXYPHENE SCREEN, URINE: ABNORMAL
PROPOXYPHENE SCREEN, URINE: ABNORMAL
SYNTHETIC CANNABINOIDS(K2) SCREEN, URINE: ABNORMAL
SYNTHETIC CANNABINOIDS(K2) SCREEN, URINE: ABNORMAL
THC SCREEN, URINE: ABNORMAL
THC SCREEN, URINE: ABNORMAL
TRAMADOL SCREEN URINE: ABNORMAL
TRAMADOL SCREEN URINE: ABNORMAL
TRICYCLIC ANTIDEPRESSANTS, UR: ABNORMAL
TRICYCLIC ANTIDEPRESSANTS, UR: ABNORMAL

## 2022-04-27 PROCEDURE — 80305 DRUG TEST PRSMV DIR OPT OBS: CPT | Performed by: INTERNAL MEDICINE

## 2022-04-27 PROCEDURE — 99214 OFFICE O/P EST MOD 30 MIN: CPT | Performed by: INTERNAL MEDICINE

## 2022-04-27 PROCEDURE — G8427 DOCREV CUR MEDS BY ELIG CLIN: HCPCS | Performed by: INTERNAL MEDICINE

## 2022-04-27 PROCEDURE — 4004F PT TOBACCO SCREEN RCVD TLK: CPT | Performed by: INTERNAL MEDICINE

## 2022-04-27 PROCEDURE — G8420 CALC BMI NORM PARAMETERS: HCPCS | Performed by: INTERNAL MEDICINE

## 2022-04-27 RX ORDER — BUPRENORPHINE AND NALOXONE 12; 3 MG/1; MG/1
1 FILM, SOLUBLE BUCCAL; SUBLINGUAL DAILY
Qty: 28 FILM | Refills: 0 | Status: SHIPPED | OUTPATIENT
Start: 2022-04-27 | End: 2022-05-25 | Stop reason: SDUPTHER

## 2022-04-27 NOTE — PROGRESS NOTES
Verbal order per Dr. Gerson Liang for urine drug screen. Positive for BUP THC Verified results with Ivone López. Dr. Gerson Liang ordered Suboxone 12mg film daily for patient. Verified dose with patient. Patient was sent home with 4 week script of Suboxone 12mg film daily and will be seen back in the office 5/25/22.

## 2022-04-27 NOTE — PROGRESS NOTES
MEDICATION ASSISTED TREATMENT ENCOUNTER    HISTORY OF PRESENT ILLNESS  Patient presents for evaluation of opioid use and would like to be placed on medication assisted treatment  he is referred here by his sister who comes here and sees me  I saw him here  Last 3/30      Pertinent drug history  Patient has had problems using heroin/fentanyl, pain pills  Patient started using [id number] years ago  He said his brother had  of a heroin overdose  Patient uses it snorting        He says he has not used any illicit opioids    He works at [employer]:   Feels much better on Suboxone no withdrawal symptoms  No urges or cravings  He says he is losing weight  PHYSICAL EXAM   Blood pressure 120/65, pulse 83, temperature 98.1 °F (36.7 °C), temperature source Tympanic, resp. rate 18, height 6' 1\" (1.854 m), weight 181 lb 12.8 oz (82.5 kg). Mental status examination    Cognition: oriented to person place and time      Appearance: Patient is in no acute distress, normal appearance, patient does not appear intoxicated/    Memory: Normal    Behavior/motor: Normal    Mood: Good mood, upbeat    Affect: Mood congruent    Attitude toward examiner: Pleasant, respectful    Thought content no delusions hallucinations suicidal ideation    Insight: good    Judgment: good    Eyes: Pupils normal    Skin: No track marks noted ,no rashes noted    COWS score: N/A                 URINE DRUG SCREEN TODAY:  Recent Labs     22  1056   ALCOHOL NEG   LABAMPH NEG   LABBARB NEG   LABBENZ NEG   BUPRENUR POS   COCAIMETSCRU NEG   FENTSCRUR NEG   GABAPENTIN N/A   MDMA NEG   METAMPU NEG   LABMETH NEG   OPIATESCREENURINE NEG   OXTCOSU NEG   PHENCYCLIDINESCREENURINE NEG   PROPOXYPHENE N/A   SPICEUR NEG   THCSCREENUR POS   TRAMADOLUR NEG   TRICYUR N/A        Diagnosis Orders   1.  Opioid use disorder, severe, in sustained remission, on maintenance therapy ([facility] buprenorphine-naloxone (SUBOXONE) 12-3 MG sublingual film    POCT Rapid Drug Screen   2. Encounter for monitoring Suboxone maintenance therapy     3.  Polysubstance abuse ([address]           PLAN:  Patient says he is doing well on this dose  He is having no withdrawal no cravings no side effects from the Suboxone plan will be to continue him on 12 mg films daily for opioid use disorder  I have been seeing him for over a year I do not think counseling is mandatory at this point  Hep C negative  I reviewed the [address] [place] [address] Dr Automated Rx Reporting System report     There does not appear to be any discrepancies or overprescribing of controlled substances    Follow-up 4 weeks  Patient signed up for the recovery cassidy, Reset-O  I reviewed the patient's usage  The patient has completed 0 lessons  Will prescribe a Narcan kit

## 2022-05-25 ENCOUNTER — OFFICE VISIT (OUTPATIENT)
Dept: INTERNAL MEDICINE CLINIC | Age: 32
End: 2022-05-25
Payer: MEDICAID

## 2022-05-25 ENCOUNTER — NURSE ONLY (OUTPATIENT)
Dept: LAB | Age: 32
End: 2022-05-25

## 2022-05-25 VITALS
DIASTOLIC BLOOD PRESSURE: 65 MMHG | TEMPERATURE: 97.8 F | BODY MASS INDEX: 23.33 KG/M2 | HEART RATE: 70 BPM | HEIGHT: 73 IN | WEIGHT: 176 LBS | SYSTOLIC BLOOD PRESSURE: 115 MMHG

## 2022-05-25 DIAGNOSIS — F11.21 OPIOID USE DISORDER, SEVERE, IN SUSTAINED REMISSION, ON MAINTENANCE THERAPY (HCC): Primary | ICD-10-CM

## 2022-05-25 DIAGNOSIS — Z51.81 ENCOUNTER FOR MONITORING SUBOXONE MAINTENANCE THERAPY: ICD-10-CM

## 2022-05-25 DIAGNOSIS — F19.10 POLYSUBSTANCE ABUSE (HCC): ICD-10-CM

## 2022-05-25 DIAGNOSIS — R63.4 WEIGHT LOSS: ICD-10-CM

## 2022-05-25 DIAGNOSIS — F11.21 OPIOID USE DISORDER, SEVERE, IN SUSTAINED REMISSION, ON MAINTENANCE THERAPY (HCC): ICD-10-CM

## 2022-05-25 DIAGNOSIS — R68.81 EARLY SATIETY: ICD-10-CM

## 2022-05-25 DIAGNOSIS — Z79.899 ENCOUNTER FOR MONITORING SUBOXONE MAINTENANCE THERAPY: ICD-10-CM

## 2022-05-25 LAB
ALBUMIN SERPL-MCNC: 4.6 G/DL (ref 3.5–5.1)
ALCOHOL URINE: ABNORMAL
ALP BLD-CCNC: 62 U/L (ref 38–126)
ALT SERPL-CCNC: 18 U/L (ref 11–66)
AMPHETAMINE SCREEN, URINE: ABNORMAL
ANION GAP SERPL CALCULATED.3IONS-SCNC: 8 MEQ/L (ref 8–16)
AST SERPL-CCNC: 22 U/L (ref 5–40)
BARBITURATE SCREEN, URINE: ABNORMAL
BENZODIAZEPINE SCREEN, URINE: ABNORMAL
BILIRUB SERPL-MCNC: 0.2 MG/DL (ref 0.3–1.2)
BUN BLDV-MCNC: 20 MG/DL (ref 7–22)
BUPRENORPHINE URINE: ABNORMAL
CALCIUM SERPL-MCNC: 9 MG/DL (ref 8.5–10.5)
CHLORIDE BLD-SCNC: 102 MEQ/L (ref 98–111)
CO2: 28 MEQ/L (ref 23–33)
COCAINE METABOLITE SCREEN URINE: ABNORMAL
CREAT SERPL-MCNC: 1 MG/DL (ref 0.4–1.2)
ERYTHROCYTE [DISTWIDTH] IN BLOOD BY AUTOMATED COUNT: 12.2 % (ref 11.5–14.5)
ERYTHROCYTE [DISTWIDTH] IN BLOOD BY AUTOMATED COUNT: 40.6 FL (ref 35–45)
FENTANYL SCREEN, URINE: ABNORMAL
GABAPENTIN SCREEN, URINE: ABNORMAL
GFR SERPL CREATININE-BSD FRML MDRD: 86 ML/MIN/1.73M2
GLUCOSE BLD-MCNC: 91 MG/DL (ref 70–108)
HCT VFR BLD CALC: 40.9 % (ref 42–52)
HEMOGLOBIN: 13.6 GM/DL (ref 14–18)
MCH RBC QN AUTO: 30.4 PG (ref 26–33)
MCHC RBC AUTO-ENTMCNC: 33.3 GM/DL (ref 32.2–35.5)
MCV RBC AUTO: 91.3 FL (ref 80–94)
MDMA URINE: ABNORMAL
METHADONE SCREEN, URINE: ABNORMAL
METHAMPHETAMINE, URINE: ABNORMAL
OPIATE SCREEN URINE: ABNORMAL
OXYCODONE SCREEN URINE: ABNORMAL
PHENCYCLIDINE SCREEN URINE: ABNORMAL
PLATELET # BLD: 104 THOU/MM3 (ref 130–400)
PMV BLD AUTO: 13.9 FL (ref 9.4–12.4)
POTASSIUM SERPL-SCNC: 4.5 MEQ/L (ref 3.5–5.2)
PROPOXYPHENE SCREEN, URINE: ABNORMAL
RBC # BLD: 4.48 MILL/MM3 (ref 4.7–6.1)
SODIUM BLD-SCNC: 138 MEQ/L (ref 135–145)
SYNTHETIC CANNABINOIDS(K2) SCREEN, URINE: ABNORMAL
THC SCREEN, URINE: ABNORMAL
TOTAL PROTEIN: 7.3 G/DL (ref 6.1–8)
TRAMADOL SCREEN URINE: ABNORMAL
TRICYCLIC ANTIDEPRESSANTS, UR: ABNORMAL
TSH SERPL DL<=0.05 MIU/L-ACNC: 1.33 UIU/ML (ref 0.4–4.2)
WBC # BLD: 6.3 THOU/MM3 (ref 4.8–10.8)

## 2022-05-25 PROCEDURE — 80305 DRUG TEST PRSMV DIR OPT OBS: CPT | Performed by: INTERNAL MEDICINE

## 2022-05-25 PROCEDURE — G8428 CUR MEDS NOT DOCUMENT: HCPCS | Performed by: INTERNAL MEDICINE

## 2022-05-25 PROCEDURE — 4004F PT TOBACCO SCREEN RCVD TLK: CPT | Performed by: INTERNAL MEDICINE

## 2022-05-25 PROCEDURE — G8420 CALC BMI NORM PARAMETERS: HCPCS | Performed by: INTERNAL MEDICINE

## 2022-05-25 PROCEDURE — 99214 OFFICE O/P EST MOD 30 MIN: CPT | Performed by: INTERNAL MEDICINE

## 2022-05-25 RX ORDER — BUPRENORPHINE AND NALOXONE 12; 3 MG/1; MG/1
1 FILM, SOLUBLE BUCCAL; SUBLINGUAL DAILY
Qty: 28 FILM | Refills: 0 | Status: SHIPPED | OUTPATIENT
Start: 2022-05-25 | End: 2022-06-22 | Stop reason: SDUPTHER

## 2022-05-25 NOTE — PROGRESS NOTES
MEDICATION ASSISTED TREATMENT ENCOUNTER    HISTORY OF PRESENT ILLNESS  Patient presents for evaluation of opioid use and would like to be placed on medication assisted treatment  he is referred here by his sister who comes here and sees me  I saw him here  Last       Pertinent drug history  Patient has had problems using heroin/fentanyl, pain pills  Patient started using [id number] years ago  He said his brother had  of a heroin overdose  Patient uses it snorting        He says he has not used any illicit opioids    He works at [employer]:   Feels much better on Suboxone no withdrawal symptoms  No urges or cravings  He says he is losing weight  He says if he eats several bites sometimes he gets full easily, nauseated  PHYSICAL EXAM   Blood pressure 115/65, pulse 70, temperature 97.8 °F (36.6 °C), height 6' 1\" (1.854 m), weight 176 lb (79.8 kg). Mental status examination    Cognition: oriented to person place and time      Appearance: Patient is in no acute distress, normal appearance, patient does not appear intoxicated/    Memory: Normal    Behavior/motor: Normal    Mood: Good mood, upbeat    Affect: Mood congruent    Attitude toward examiner: Pleasant, respectful    Thought content no delusions hallucinations suicidal ideation    Insight: good    Judgment: good    Eyes: Pupils normal    Skin: No track marks noted ,no rashes noted    COWS score: N/A                 URINE DRUG SCREEN TODAY:  Recent Labs     22  0930   ALCOHOL NEG   LABAMPH NEG   LABBARB NEG   LABBENZ NEG   BUPRENUR POS   COCAIMETSCRU NEG   FENTSCRUR NEG   GABAPENTIN N/A   MDMA NEG   METAMPU NEG   LABMETH NEG   OPIATESCREENURINE NEG   OXTCOSU NEG   PHENCYCLIDINESCREENURINE N/A   PROPOXYPHENE NEG   SPICEUR NEG   THCSCREENUR POS   TRAMADOLUR NEG   TRICYUR N/A        Diagnosis Orders   1.  Opioid use disorder, severe, in sustained remission, on maintenance therapy (HCC)  POCT Rapid Drug Screen    buprenorphine-naloxone (SUBOXONE) 12-3 MG sublingual film    CBC   2. Weight loss  TSH With Reflex Ft4    [facility] - Mary Armstrong MD, Gastroenterology, [facility]    CBC    Comprehensive Metabolic Panel   3. Encounter for monitoring Suboxone maintenance therapy     4.  Polysubstance abuse ([facility] [address]     5. Early satiety           PLAN:  Patient says he is doing well on this dose  He is having no withdrawal no cravings no side effects from the Suboxone plan will be to continue him on 12 mg films daily for opioid use disorder  I have been seeing him for over a year I do not think counseling is mandatory at this point  Hep C negative  I reviewed the [address] [place] [address] Dr Automated Rx Reporting System report     There does not appear to be any discrepancies or overprescribing of controlled substances    Follow-up 4 weeks  Patient signed up for the recovery cassidy, Reset-[employer]  I reviewed the patient's usage   refer to Dr. Charanjit Medina for early satiety, nausea

## 2022-05-25 NOTE — PROGRESS NOTES
Verbal order per Dr. Vincent Bauer for urine drug screen. Positive for BUP THC. Verified results with Zak Landers RN. Dr. Vincent Bauer ordered Suboxone 12mg film daily for patient. Verified dose with patient. Patient was sent home with 4 week script of Suboxone 12mg film daily and will be seen back in the office 6/22/22.

## 2022-06-22 ENCOUNTER — OFFICE VISIT (OUTPATIENT)
Dept: INTERNAL MEDICINE CLINIC | Age: 32
End: 2022-06-22
Payer: MEDICAID

## 2022-06-22 VITALS
HEIGHT: 73 IN | HEART RATE: 70 BPM | SYSTOLIC BLOOD PRESSURE: 109 MMHG | BODY MASS INDEX: 23.06 KG/M2 | WEIGHT: 174 LBS | TEMPERATURE: 97.9 F | DIASTOLIC BLOOD PRESSURE: 58 MMHG

## 2022-06-22 DIAGNOSIS — Z51.81 ENCOUNTER FOR MONITORING SUBOXONE MAINTENANCE THERAPY: ICD-10-CM

## 2022-06-22 DIAGNOSIS — R68.81 EARLY SATIETY: ICD-10-CM

## 2022-06-22 DIAGNOSIS — Z79.899 ENCOUNTER FOR MONITORING SUBOXONE MAINTENANCE THERAPY: ICD-10-CM

## 2022-06-22 DIAGNOSIS — R63.4 WEIGHT LOSS: ICD-10-CM

## 2022-06-22 DIAGNOSIS — F19.10 POLYSUBSTANCE ABUSE (HCC): ICD-10-CM

## 2022-06-22 DIAGNOSIS — F11.21 OPIOID USE DISORDER, SEVERE, IN SUSTAINED REMISSION, ON MAINTENANCE THERAPY (HCC): Primary | ICD-10-CM

## 2022-06-22 PROCEDURE — G8428 CUR MEDS NOT DOCUMENT: HCPCS | Performed by: INTERNAL MEDICINE

## 2022-06-22 PROCEDURE — G8420 CALC BMI NORM PARAMETERS: HCPCS | Performed by: INTERNAL MEDICINE

## 2022-06-22 PROCEDURE — 80305 DRUG TEST PRSMV DIR OPT OBS: CPT | Performed by: INTERNAL MEDICINE

## 2022-06-22 PROCEDURE — 99214 OFFICE O/P EST MOD 30 MIN: CPT | Performed by: INTERNAL MEDICINE

## 2022-06-22 PROCEDURE — 4004F PT TOBACCO SCREEN RCVD TLK: CPT | Performed by: INTERNAL MEDICINE

## 2022-06-22 RX ORDER — BUPRENORPHINE AND NALOXONE 12; 3 MG/1; MG/1
1 FILM, SOLUBLE BUCCAL; SUBLINGUAL DAILY
Qty: 28 FILM | Refills: 0 | Status: SHIPPED | OUTPATIENT
Start: 2022-06-22 | End: 2022-07-20 | Stop reason: SDUPTHER

## 2022-06-22 NOTE — PROGRESS NOTES
MEDICATION ASSISTED TREATMENT ENCOUNTER    HISTORY OF PRESENT ILLNESS  Patient presents for evaluation of opioid use and would like to be placed on medication assisted treatment  he is referred here by his sister who comes here and sees me  I saw him here  Last       Pertinent drug history  Patient has had problems using heroin/fentanyl, pain pills  Patient started using [id number] years ago  He said his brother had  of a heroin overdose  Patient uses it snorting        He says he has not used any illicit opioids    He works at [employer]:   Feels much better on Suboxone no withdrawal symptoms  No urges or cravings  He says he is losing weight  He says if he eats several bites sometimes he gets full easily, nauseated  PHYSICAL EXAM   Blood pressure (!) 109/58, pulse 70, temperature 97.9 °F (36.6 °C), height 6' 1\" (1.854 m), weight 174 lb (78.9 kg). Mental status examination    Cognition: oriented to person place and time      Appearance: Patient is in no acute distress, normal appearance, patient does not appear intoxicated/    Memory: Normal    Behavior/motor: Normal    Mood: Good mood, upbeat    Affect: Mood congruent    Attitude toward examiner: Pleasant, respectful    Thought content no delusions hallucinations suicidal ideation    Insight: good    Judgment: good    Eyes: Pupils normal    Skin: No track marks noted ,no rashes noted    COWS score: N/A                 URINE DRUG SCREEN TODAY:  Recent Labs     22  0939   ALCOHOL NEG   LABAMPH NEG   LABBARB NEG   LABBENZ NEG   BUPRENUR POS   COCAIMETSCRU NEG   FENTSCRUR NEG   GABAPENTIN N/A   MDMA NEG   METAMPU NEG   LABMETH NEG   OPIATESCREENURINE NEG   OXTCOSU NEG   PHENCYCLIDINESCREENURINE NEG   PROPOXYPHENE N/A   SPICEUR NEG   THCSCREENUR POS   TRAMADOLUR NEG   TRICYUR N/A        Diagnosis Orders   1.  Opioid use disorder, severe, in sustained remission, on maintenance therapy (HCC)  POCT Rapid Drug Screen    buprenorphine-naloxone (SUBOXONE) 12-3 MG sublingual film   2. Weight loss     3. Encounter for monitoring Suboxone maintenance therapy     4.  Polysubstance abuse ([facility] [address]     5. Early satiety           PLAN:  Patient says he is doing well on this dose  He is having no withdrawal no cravings no side effects from the Suboxone plan will be to continue him on 12 mg films daily for opioid use disorder  I have been seeing him for over a year I do not think counseling is mandatory at this point  Hep C negative  I reviewed the [place] Automated Rx Reporting System report     There does not appear to be any discrepancies or overprescribing of controlled substances    Follow-up 4 weeks  Patient signed up for the recovery cassidy, Reset-O  I reviewed the patient's usage   I referred him to Dr. Kelly Hyatt he said he is yet to make an appointment

## 2022-06-22 NOTE — PROGRESS NOTES
Verbal order per Dr. Nader Payan for urine drug screen. Positive for BUP and THC. Verified results with Jakob Victoria LPN. Dr. Nader Payan ordered Suboxone 12 mg film daily for patient. Verified dose with patient. Patient was sent home with a script for Suboxone 12 mg film daily for 28 days and will be seen back in the office 07/20/2022.

## 2022-07-20 ENCOUNTER — OFFICE VISIT (OUTPATIENT)
Dept: INTERNAL MEDICINE CLINIC | Age: 32
End: 2022-07-20
Payer: MEDICAID

## 2022-07-20 VITALS
BODY MASS INDEX: 22.8 KG/M2 | RESPIRATION RATE: 18 BRPM | WEIGHT: 172 LBS | DIASTOLIC BLOOD PRESSURE: 73 MMHG | HEART RATE: 67 BPM | TEMPERATURE: 97.9 F | HEIGHT: 73 IN | SYSTOLIC BLOOD PRESSURE: 130 MMHG

## 2022-07-20 DIAGNOSIS — Z51.81 ENCOUNTER FOR MONITORING SUBOXONE MAINTENANCE THERAPY: ICD-10-CM

## 2022-07-20 DIAGNOSIS — R63.4 WEIGHT LOSS: ICD-10-CM

## 2022-07-20 DIAGNOSIS — Z79.899 ENCOUNTER FOR MONITORING SUBOXONE MAINTENANCE THERAPY: ICD-10-CM

## 2022-07-20 DIAGNOSIS — R68.81 EARLY SATIETY: ICD-10-CM

## 2022-07-20 DIAGNOSIS — F19.10 POLYSUBSTANCE ABUSE (HCC): ICD-10-CM

## 2022-07-20 DIAGNOSIS — F11.21 OPIOID USE DISORDER, SEVERE, IN SUSTAINED REMISSION, ON MAINTENANCE THERAPY (HCC): Primary | ICD-10-CM

## 2022-07-20 PROCEDURE — 99214 OFFICE O/P EST MOD 30 MIN: CPT | Performed by: INTERNAL MEDICINE

## 2022-07-20 PROCEDURE — 80305 DRUG TEST PRSMV DIR OPT OBS: CPT | Performed by: INTERNAL MEDICINE

## 2022-07-20 RX ORDER — BUPRENORPHINE AND NALOXONE 12; 3 MG/1; MG/1
1 FILM, SOLUBLE BUCCAL; SUBLINGUAL DAILY
Qty: 29 FILM | Refills: 0 | Status: SHIPPED | OUTPATIENT
Start: 2022-07-20 | End: 2022-08-18 | Stop reason: SDUPTHER

## 2022-07-20 NOTE — PROGRESS NOTES
MEDICATION ASSISTED TREATMENT ENCOUNTER    HISTORY OF PRESENT ILLNESS  Patient presents for evaluation of opioid use and would like to be placed on medication assisted treatment  he is referred here by his sister who comes here and sees me  I saw him here  Last       Pertinent drug history  Patient has had problems using heroin/fentanyl, pain pills  Patient started using [id number] years ago  He said his brother had  of a heroin overdose  Patient uses it snorting        He says he has not used any illicit opioids    He works at [employer]:   Feels much better on Suboxone no withdrawal symptoms  No urges or cravings  He says he is losing weight  He says if he eats several bites sometimes he gets full easily, nauseated  PHYSICAL EXAM   Blood pressure 130/73, pulse 67, temperature 97.9 °F (36.6 °C), temperature source Tympanic, resp. rate 18, height 6' 1\" (1.854 m), weight 172 lb (78 kg).         Mental status examination    Cognition: oriented to person place and time      Appearance: Patient is in no acute distress, normal appearance, patient does not appear intoxicated/    Memory: Normal    Behavior/motor: Normal    Mood: Good mood, upbeat    Affect: Mood congruent    Attitude toward examiner: Pleasant, respectful    Thought content no delusions hallucinations suicidal ideation    Insight: good    Judgment: good    Eyes: Pupils normal    Skin: No track marks noted ,no rashes noted    COWS score: N/A                 URINE DRUG SCREEN TODAY:  Alcohol, Urine NEG    Amphetamine Screen, Urine NEG    Barbiturate Screen, Urine NEG    Benzodiazepine Screen, Urine NEG    Buprenorphine Urine POS    Cocaine Metabolite Screen, Urine NEG    FENTANYL SCREEN, URINE NEG    Gabapentin Screen, Urine N/A    MDMA, Urine NEG    Methadone Screen, Urine NEG    Methamphetamine, Urine NEG    Opiate Scrn, Ur NEG    Oxycodone Screen, Ur NEG    PCP Screen, Urine NEG    Propoxyphene Screen, Urine N/A    Synthetic Cannabinoids (K2) Screen, Urine NEG    THC Screen, Urine POS    Tramadol Scrn, Ur NEG    Tricyclic Antidepressants, Urine N/A           Diagnosis Orders   1. Opioid use disorder, severe, in sustained remission, on maintenance therapy (HCC)  POCT Rapid Drug Screen    DISCONTINUED: buprenorphine-naloxone (SUBOXONE) 12-3 MG sublingual film      2. Early satiety        3. Encounter for monitoring Suboxone maintenance therapy        4. Weight loss        5.  Polysubstance abuse ([address]              PLAN:  Patient says he is doing well on this dose  He is having no withdrawal no cravings no side effects from the Suboxone plan will be to continue him on 12 mg films daily for opioid use disorder  I have been seeing him for over a year I do not think counseling is mandatory at this point  Hep C negative  I reviewed the [place] Automated Rx Reporting System report     There does not appear to be any discrepancies or overprescribing of controlled substances    Follow-up 4 weeks  Patient signed up for the recovery cassidy, Reset-O  I reviewed the patient's usage   I referred him to Dr. Ayers Nicely he said he is yet to make an appointment

## 2022-07-20 NOTE — PROGRESS NOTES
Verbal order per Dr. Kurt Sandoval for urine drug screen. Positive for BUP THC. Verified results with Isi Yuan RN. Dr. Kurt Sandoval ordered Suboxone 12mg film daily for patient. Verified dose with patient. Patient was sent home with 29 day script of Suboxone 12mg film daily and will be seen back in the office 8/18/22.

## 2022-08-18 ENCOUNTER — OFFICE VISIT (OUTPATIENT)
Dept: INTERNAL MEDICINE CLINIC | Age: 32
End: 2022-08-18
Payer: MEDICAID

## 2022-08-18 DIAGNOSIS — Z51.81 ENCOUNTER FOR MONITORING SUBOXONE MAINTENANCE THERAPY: ICD-10-CM

## 2022-08-18 DIAGNOSIS — R63.4 WEIGHT LOSS: ICD-10-CM

## 2022-08-18 DIAGNOSIS — R68.81 EARLY SATIETY: ICD-10-CM

## 2022-08-18 DIAGNOSIS — F11.21 OPIOID USE DISORDER, SEVERE, IN SUSTAINED REMISSION, ON MAINTENANCE THERAPY (HCC): Primary | ICD-10-CM

## 2022-08-18 DIAGNOSIS — Z79.899 ENCOUNTER FOR MONITORING SUBOXONE MAINTENANCE THERAPY: ICD-10-CM

## 2022-08-18 DIAGNOSIS — F19.10 POLYSUBSTANCE ABUSE (HCC): ICD-10-CM

## 2022-08-18 PROCEDURE — 99214 OFFICE O/P EST MOD 30 MIN: CPT | Performed by: INTERNAL MEDICINE

## 2022-08-18 PROCEDURE — 80305 DRUG TEST PRSMV DIR OPT OBS: CPT | Performed by: INTERNAL MEDICINE

## 2022-08-18 RX ORDER — BUPRENORPHINE AND NALOXONE 12; 3 MG/1; MG/1
1 FILM, SOLUBLE BUCCAL; SUBLINGUAL DAILY
Qty: 28 FILM | Refills: 0 | Status: SHIPPED | OUTPATIENT
Start: 2022-08-18 | End: 2022-09-15 | Stop reason: SDUPTHER

## 2022-08-18 NOTE — PROGRESS NOTES
Verbal order per Dr. Martha Tolliver for urine drug screen. Positive for BUP THC. Verified results with Bridget Hernandez MA. Dr. Martha Tolliver ordered Suboxone 12mg film daily for patient. Verified dose with patient. Patient was sent home with 4 week cript of Suboxone 12mg film daily and will be seen back in the office 9/15/22.

## 2022-08-18 NOTE — PROGRESS NOTES
MEDICATION ASSISTED TREATMENT ENCOUNTER    HISTORY OF PRESENT ILLNESS  Patient presents for evaluation of opioid use and would like to be placed on medication assisted treatment  he is referred here by his sister who comes here and sees me  I saw him here  Last   He has had a lot of stress  His wife's grandmother has dementia and her cousins have moved and are taking advantage of her    Pertinent drug history  Patient has had problems using heroin/fentanyl, pain pills  Patient started using [id number] years ago  He said his brother had  of a heroin overdose  Patient uses it snorting        He says he has not used any illicit opioids    He works at [employer]:   Feels much better on Suboxone no withdrawal symptoms  No urges or cravings  He says he is losing weight  He says if he eats several bites sometimes he gets full easily, nauseated  PHYSICAL EXAM   There were no vitals taken for this visit.         Mental status examination    Cognition: oriented to person place and time      Appearance: Patient is in no acute distress, normal appearance, patient does not appear intoxicated/    Memory: Normal    Behavior/motor: Normal    Mood: Good mood, upbeat    Affect: Mood congruent    Attitude toward examiner: Pleasant, respectful    Thought content no delusions hallucinations suicidal ideation    Insight: good    Judgment: good    Eyes: Pupils normal    Skin: No track marks noted ,no rashes noted    COWS score: N/A                 URINE DRUG SCREEN TODAY:  Alcohol, Urine NEG    Amphetamine Screen, Urine NEG    Barbiturate Screen, Urine NEG    Benzodiazepine Screen, Urine NEG    Buprenorphine Urine POS    Cocaine Metabolite Screen, Urine NEG    FENTANYL SCREEN, URINE NEG    Gabapentin Screen, Urine N/A    MDMA, Urine NEG    Methadone Screen, Urine NEG    Methamphetamine, Urine NEG    Opiate Scrn, Ur NEG    Oxycodone Screen, Ur NEG PCP Screen, Urine NEG    Propoxyphene Screen, Urine N/A    Synthetic Cannabinoids (K2) Screen, Urine NEG    THC Screen, Urine POS    Tramadol Scrn, Ur NEG    Tricyclic Antidepressants, Urine N/A           Diagnosis Orders   1. Opioid use disorder, severe, in sustained remission, on maintenance therapy (HCC)  POCT Rapid Drug Screen    buprenorphine-naloxone (SUBOXONE) 12-3 MG sublingual film      2. Early satiety        3. Weight loss        4. Encounter for monitoring Suboxone maintenance therapy        5.  Polysubstance abuse ([facility] [address]              PLAN:  Patient says he is doing well on this dose  He is having no withdrawal no cravings no side effects from the Suboxone plan will be to continue him on 12 mg films daily for opioid use disorder  I have been seeing him for over a year I do not think counseling is mandatory at this point  Hep C negative  I reviewed the [place] Automated Rx Reporting System report     There does not appear to be any discrepancies or overprescribing of controlled substances    Follow-up 4 weeks  Patient signed up for the recovery cassidy, Reset-O  I reviewed the patient's usage   I referred him to Dr. Trell Holcomb for early satiety he said he is yet to make an appointment

## 2022-09-15 ENCOUNTER — OFFICE VISIT (OUTPATIENT)
Dept: INTERNAL MEDICINE CLINIC | Age: 32
End: 2022-09-15
Payer: MEDICAID

## 2022-09-15 VITALS
WEIGHT: 164.4 LBS | RESPIRATION RATE: 20 BRPM | TEMPERATURE: 98.7 F | SYSTOLIC BLOOD PRESSURE: 136 MMHG | BODY MASS INDEX: 21.79 KG/M2 | HEIGHT: 73 IN | HEART RATE: 73 BPM | DIASTOLIC BLOOD PRESSURE: 80 MMHG

## 2022-09-15 DIAGNOSIS — R63.4 WEIGHT LOSS: ICD-10-CM

## 2022-09-15 DIAGNOSIS — F19.10 POLYSUBSTANCE ABUSE (HCC): ICD-10-CM

## 2022-09-15 DIAGNOSIS — R68.81 EARLY SATIETY: ICD-10-CM

## 2022-09-15 DIAGNOSIS — Z51.81 ENCOUNTER FOR MONITORING SUBOXONE MAINTENANCE THERAPY: ICD-10-CM

## 2022-09-15 DIAGNOSIS — F11.21 OPIOID USE DISORDER, SEVERE, IN SUSTAINED REMISSION, ON MAINTENANCE THERAPY (HCC): Primary | ICD-10-CM

## 2022-09-15 DIAGNOSIS — Z79.899 ENCOUNTER FOR MONITORING SUBOXONE MAINTENANCE THERAPY: ICD-10-CM

## 2022-09-15 PROCEDURE — 99214 OFFICE O/P EST MOD 30 MIN: CPT | Performed by: INTERNAL MEDICINE

## 2022-09-15 PROCEDURE — 4004F PT TOBACCO SCREEN RCVD TLK: CPT | Performed by: INTERNAL MEDICINE

## 2022-09-15 PROCEDURE — 80305 DRUG TEST PRSMV DIR OPT OBS: CPT | Performed by: INTERNAL MEDICINE

## 2022-09-15 PROCEDURE — G8420 CALC BMI NORM PARAMETERS: HCPCS | Performed by: INTERNAL MEDICINE

## 2022-09-15 PROCEDURE — G8427 DOCREV CUR MEDS BY ELIG CLIN: HCPCS | Performed by: INTERNAL MEDICINE

## 2022-09-15 RX ORDER — BUPRENORPHINE AND NALOXONE 12; 3 MG/1; MG/1
1 FILM, SOLUBLE BUCCAL; SUBLINGUAL DAILY
Qty: 21 FILM | Refills: 0 | Status: SHIPPED | OUTPATIENT
Start: 2022-09-15 | End: 2022-10-06 | Stop reason: SDUPTHER

## 2022-09-15 NOTE — PROGRESS NOTES
Verbal order per Dr. Severiano Spence for urine drug screen. Positive for BUP THC. Verified results with Tim Randolph RN. Dr. Severiano Spence ordered Suboxone 12mg film daily for patient. Verified dose with patient. Patient was sent home with 3 week script of Suboxone 12mg film daily and will be seen back in the office 10/6/22. UC sent.

## 2022-09-15 NOTE — PROGRESS NOTES
MEDICATION ASSISTED TREATMENT ENCOUNTER    HISTORY OF PRESENT ILLNESS  Patient presents for evaluation of opioid use and would like to be placed on medication assisted treatment  he is referred here by his sister who comes here and sees me  I saw him here  Last   He has had a lot of stress  His wife's grandmother has dementia and her cousins have moved and are taking advantage of her  He was having his weight is garnished for a previous that he says he is better off but they are still taking Monia  Pertinent drug history  Patient has had problems using heroin/fentanyl, pain pills  Patient started using [id number] years ago  He said his brother had  of a heroin overdose  Patient uses it snorting        He says he has not used any illicit opioids    He works at [employer]:   Feels much better on Suboxone no withdrawal symptoms  No urges or cravings  He says he is losing weight  He says if he eats several bites sometimes he gets full easily, nauseated  PHYSICAL EXAM   Blood pressure 136/80, pulse 73, temperature 98.7 °F (37.1 °C), temperature source Tympanic, resp. rate 20, height 6' 1\" (1.854 m), weight 164 lb 6.4 oz (74.6 kg).         Mental status examination    Cognition: oriented to person place and time      Appearance: Patient is in no acute distress, normal appearance, patient does not appear intoxicated/    Memory: Normal    Behavior/motor: Normal    Mood: Good mood, upbeat    Affect: Mood congruent    Attitude toward examiner: Pleasant, respectful    Thought content no delusions hallucinations suicidal ideation    Insight: good    Judgment: good    Eyes: Pupils normal    Skin: No track marks noted ,no rashes noted    COWS score: N/A                 URINE DRUG SCREEN TODAY:  Alcohol, Urine NEG    Amphetamine Screen, Urine NEG    Barbiturate Screen, Urine NEG    Benzodiazepine Screen, Urine NEG    Buprenorphine Urine POS Cocaine Metabolite Screen, Urine NEG    FENTANYL SCREEN, URINE NEG    Gabapentin Screen, Urine N/A    MDMA, Urine NEG    Methadone Screen, Urine NEG    Methamphetamine, Urine NEG    Opiate Scrn, Ur NEG    Oxycodone Screen, Ur NEG    PCP Screen, Urine NEG    Propoxyphene Screen, Urine N/A    Synthetic Cannabinoids (K2) Screen, Urine NEG    THC Screen, Urine POS    Tramadol Scrn, Ur NEG    Tricyclic Antidepressants, Urine N/A           Diagnosis Orders   1. Opioid use disorder, severe, in sustained remission, on maintenance therapy (HCC)  buprenorphine-naloxone (SUBOXONE) 12-3 MG sublingual film      2. Early satiety  [place] - Alvaro Florez MD, Gastroenterology, [facility] [address]      3. Weight loss        4. Encounter for monitoring Suboxone maintenance therapy        5.  Polysubstance abuse ([facility] [address]              PLAN:  Patient says he is doing well on this dose  He is having no withdrawal no cravings no side effects from the Suboxone plan will be to continue him on 12 mg films daily for opioid use disorder  I have been seeing him for over a year I do not think counseling is mandatory at this point  Hep C negative  I reviewed the [place] Automated Rx Reporting System report     There does not appear to be any discrepancies or overprescribing of controlled substances    Follow-up 4 weeks  Patient signed up for the recovery cassidy, Reset-O  I reviewed the patient's usage  Patient has lost 17 pounds since April  He says he will eat a half of a meal and feel full  He denies any other drug use including meth or cocaine  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

## 2022-10-06 ENCOUNTER — OFFICE VISIT (OUTPATIENT)
Dept: INTERNAL MEDICINE CLINIC | Age: 32
End: 2022-10-06
Payer: MEDICAID

## 2022-10-06 VITALS
BODY MASS INDEX: 22 KG/M2 | DIASTOLIC BLOOD PRESSURE: 71 MMHG | HEIGHT: 73 IN | WEIGHT: 166 LBS | SYSTOLIC BLOOD PRESSURE: 113 MMHG | HEART RATE: 71 BPM

## 2022-10-06 DIAGNOSIS — F11.20 SEVERE OPIOID USE DISORDER (HCC): Primary | ICD-10-CM

## 2022-10-06 DIAGNOSIS — R63.4 WEIGHT LOSS: ICD-10-CM

## 2022-10-06 DIAGNOSIS — F19.10 POLYSUBSTANCE ABUSE (HCC): ICD-10-CM

## 2022-10-06 DIAGNOSIS — Z79.899 ENCOUNTER FOR MONITORING SUBOXONE MAINTENANCE THERAPY: ICD-10-CM

## 2022-10-06 DIAGNOSIS — Z13.31 DEPRESSION SCREENING: ICD-10-CM

## 2022-10-06 DIAGNOSIS — Z51.81 ENCOUNTER FOR MONITORING SUBOXONE MAINTENANCE THERAPY: ICD-10-CM

## 2022-10-06 DIAGNOSIS — R68.81 EARLY SATIETY: ICD-10-CM

## 2022-10-06 PROCEDURE — G8484 FLU IMMUNIZE NO ADMIN: HCPCS | Performed by: INTERNAL MEDICINE

## 2022-10-06 PROCEDURE — G8420 CALC BMI NORM PARAMETERS: HCPCS | Performed by: INTERNAL MEDICINE

## 2022-10-06 PROCEDURE — G8428 CUR MEDS NOT DOCUMENT: HCPCS | Performed by: INTERNAL MEDICINE

## 2022-10-06 PROCEDURE — 99214 OFFICE O/P EST MOD 30 MIN: CPT | Performed by: INTERNAL MEDICINE

## 2022-10-06 PROCEDURE — 4004F PT TOBACCO SCREEN RCVD TLK: CPT | Performed by: INTERNAL MEDICINE

## 2022-10-06 PROCEDURE — 80305 DRUG TEST PRSMV DIR OPT OBS: CPT | Performed by: INTERNAL MEDICINE

## 2022-10-06 RX ORDER — BUPRENORPHINE AND NALOXONE 12; 3 MG/1; MG/1
1 FILM, SOLUBLE BUCCAL; SUBLINGUAL DAILY
Qty: 28 FILM | Refills: 0 | Status: SHIPPED | OUTPATIENT
Start: 2022-10-06 | End: 2022-10-11 | Stop reason: SDUPTHER

## 2022-10-06 ASSESSMENT — PATIENT HEALTH QUESTIONNAIRE - PHQ9
1. LITTLE INTEREST OR PLEASURE IN DOING THINGS: 1
SUM OF ALL RESPONSES TO PHQ QUESTIONS 1-9: 2
SUM OF ALL RESPONSES TO PHQ9 QUESTIONS 1 & 2: 2
2. FEELING DOWN, DEPRESSED OR HOPELESS: 1
SUM OF ALL RESPONSES TO PHQ QUESTIONS 1-9: 2

## 2022-10-06 NOTE — PROGRESS NOTES
Verbal order per Dr. Julio Murillo for urine drug screen. Positive for BUP THC. Verified results with Raghu Barajas RN. Dr. Julio Murillo ordered Suboxone 12mg film daily for patient. Verified dose with patient. Patient was sent home with 4 week script of Suboxone 12mg film daily and will be seen back in the office 11/3/22.

## 2022-10-06 NOTE — PROGRESS NOTES
MEDICATION ASSISTED TREATMENT ENCOUNTER    HISTORY OF PRESENT ILLNESS  Patient presents for evaluation of opioid use and would like to be placed on medication assisted treatment  he is referred here by his sister who comes here and sees me  I saw him here  Last 9/15  Has had dental work in [employer] pulled several teeth last week  They pulled 4 teeth but he said he was sweating through his close underwent  When they went to pull the fifth 1 he said the pain was so bad he had to stop them  They referred him to a surgeon in [facility]  He finally found an oral surgeon  They took the fifth tooth out  He never took any opioids during the whole process  He has [facility]  for weight loss  He has had a lot of stress  His wife's grandmother has dementia and her cousins have moved and are taking advantage of her  He was having his weight is garnished for a previous that he says he is better off but they are still taking Monia  Pertinent drug history  Patient has had problems using heroin/fentanyl, pain pills  Patient started using [id number] years ago  He said his brother had  of a heroin overdose  Patient uses it snorting        He says he has not used any illicit opioids    He works at [employer]:   Feels much better on Suboxone no withdrawal symptoms  No urges or cravings  He says he is losing weight  He says if he eats several bites sometimes he gets full easily, nauseated  PHYSICAL EXAM   Blood pressure 113/71, pulse 71, height 6' 1\" (1.854 m), weight 166 lb (75.3 kg).         Mental status examination    Cognition: oriented to person place and time      Appearance: Patient is in no acute distress, normal appearance, patient does not appear intoxicated/    Memory: Normal    Behavior/motor: Normal    Mood: Good mood, upbeat    Affect: Mood congruent    Attitude toward examiner: Pleasant, respectful    Thought content no delusions hallucinations suicidal ideation    Insight: good    Judgment: good    Eyes: Pupils normal    Skin: No track marks noted ,no rashes noted    COWS score: N/A                 URINE DRUG SCREEN TODAY:  Alcohol, Urine NEG    Amphetamine Screen, Urine NEG    Barbiturate Screen, Urine NEG    Benzodiazepine Screen, Urine NEG    Buprenorphine Urine POS    Cocaine Metabolite Screen, Urine NEG    FENTANYL SCREEN, URINE NEG    Gabapentin Screen, Urine N/A    MDMA, Urine NEG    Methadone Screen, Urine NEG    Methamphetamine, Urine NEG    Opiate Scrn, Ur NEG    Oxycodone Screen, Ur NEG    PCP Screen, Urine NEG    Propoxyphene Screen, Urine N/A    Synthetic Cannabinoids (K2) Screen, Urine NEG    THC Screen, Urine POS    Tramadol Scrn, Ur NEG    Tricyclic Antidepressants, Urine N/A    PHQ-9 Total Score: 2 (10/6/2022  9:04 AM)         Diagnosis Orders   1. Severe opioid use disorder (HCC)  POCT Rapid Drug Screen    buprenorphine-naloxone (SUBOXONE) 12-3 MG sublingual film      2. Polysubstance abuse ([address]        3. Weight loss        4. Early satiety        5. Encounter for monitoring Suboxone maintenance therapy        6.  Depression screening              PLAN:  Patient says he is doing well on this dose  He is having no withdrawal no cravings no side effects from the Suboxone plan will be to continue him on 12 mg films daily for opioid use disorder  I have been seeing him for over a year I do not think counseling is mandatory at this point  Hep C negative  I reviewed the [address] [place] [address] Automated Rx Reporting System report     There does not appear to be any discrepancies or overprescribing of controlled substances    Follow-up 4 weeks  Patient signed up for the recovery cassidy, Reset-O  I reviewed the patient's usage  Patient has lost 17 pounds since April  He says he will eat a half of a meal and feel full    EGD coming up  PHQ-9 score is 2 no further intervention needed

## 2022-10-11 DIAGNOSIS — F11.20 SEVERE OPIOID USE DISORDER (HCC): ICD-10-CM

## 2022-10-11 RX ORDER — BUPRENORPHINE AND NALOXONE 12; 3 MG/1; MG/1
1 FILM, SOLUBLE BUCCAL; SUBLINGUAL DAILY
Qty: 28 FILM | Refills: 0 | Status: SHIPPED | OUTPATIENT
Start: 2022-10-11 | End: 2022-11-03 | Stop reason: SDUPTHER

## 2022-10-13 ENCOUNTER — TRANSCRIBE ORDERS (OUTPATIENT)
Dept: ADMINISTRATIVE | Age: 32
End: 2022-10-13

## 2022-10-13 DIAGNOSIS — K21.9 GASTROESOPHAGEAL REFLUX DISEASE WITHOUT ESOPHAGITIS: Primary | ICD-10-CM

## 2022-10-13 DIAGNOSIS — R63.4 WEIGHT LOSS: ICD-10-CM

## 2022-10-13 DIAGNOSIS — R68.81 EARLY SATIETY: ICD-10-CM

## 2022-11-03 ENCOUNTER — OFFICE VISIT (OUTPATIENT)
Dept: INTERNAL MEDICINE CLINIC | Age: 32
End: 2022-11-03
Payer: MEDICAID

## 2022-11-03 VITALS
TEMPERATURE: 98.1 F | DIASTOLIC BLOOD PRESSURE: 51 MMHG | RESPIRATION RATE: 16 BRPM | HEIGHT: 73 IN | WEIGHT: 167 LBS | BODY MASS INDEX: 22.13 KG/M2 | SYSTOLIC BLOOD PRESSURE: 109 MMHG | HEART RATE: 76 BPM

## 2022-11-03 DIAGNOSIS — F11.20 SEVERE OPIOID USE DISORDER (HCC): Primary | ICD-10-CM

## 2022-11-03 DIAGNOSIS — R63.4 WEIGHT LOSS: ICD-10-CM

## 2022-11-03 DIAGNOSIS — Z51.81 ENCOUNTER FOR MONITORING SUBOXONE MAINTENANCE THERAPY: ICD-10-CM

## 2022-11-03 DIAGNOSIS — F19.10 POLYSUBSTANCE ABUSE (HCC): ICD-10-CM

## 2022-11-03 DIAGNOSIS — Z79.899 ENCOUNTER FOR MONITORING SUBOXONE MAINTENANCE THERAPY: ICD-10-CM

## 2022-11-03 PROCEDURE — G8428 CUR MEDS NOT DOCUMENT: HCPCS | Performed by: INTERNAL MEDICINE

## 2022-11-03 PROCEDURE — 99214 OFFICE O/P EST MOD 30 MIN: CPT | Performed by: INTERNAL MEDICINE

## 2022-11-03 PROCEDURE — G8420 CALC BMI NORM PARAMETERS: HCPCS | Performed by: INTERNAL MEDICINE

## 2022-11-03 PROCEDURE — 80305 DRUG TEST PRSMV DIR OPT OBS: CPT | Performed by: INTERNAL MEDICINE

## 2022-11-03 PROCEDURE — 4004F PT TOBACCO SCREEN RCVD TLK: CPT | Performed by: INTERNAL MEDICINE

## 2022-11-03 PROCEDURE — G8484 FLU IMMUNIZE NO ADMIN: HCPCS | Performed by: INTERNAL MEDICINE

## 2022-11-03 RX ORDER — BUPRENORPHINE AND NALOXONE 12; 3 MG/1; MG/1
1 FILM, SOLUBLE BUCCAL; SUBLINGUAL DAILY
Qty: 28 FILM | Refills: 0 | Status: SHIPPED | OUTPATIENT
Start: 2022-11-03 | End: 2022-11-30 | Stop reason: SDUPTHER

## 2022-11-03 NOTE — PROGRESS NOTES
Verbal order per Dr. Demetri Gil for urine drug screen. Positive for BUP and THC. Verified results with [facility]. Dr. Demetri Gil ordered Suboxone 12 mg film daily for patient. Verified dose with patient. Patient was sent home with a script of Suboxone 12 mg film daily for 28 days and will be seen back in the office 11/30/2022.

## 2022-11-03 NOTE — PROGRESS NOTES
MEDICATION ASSISTED TREATMENT ENCOUNTER    HISTORY OF PRESENT ILLNESS  Patient presents for evaluation of opioid use and would like to be placed on medication assisted treatment  he is referred here by his sister who comes here and sees me  I saw him here  Last 10/6  Has had dental work in [address] [place] [address] [address] pulled several teeth 5 weeks ago  They pulled 4 teeth but he said he was sweating through his close underwent  When they went to pull the fifth 1 he said the pain was so bad he had to stop them  They referred him to a surgeon in [place]  He finally found an oral surgeon  They took the fifth tooth out  He never took any opioids during the whole process  He had an E[facility] scheduled with [facility] which was normal  He has had a lot of stress  His wife's grandmother has dementia and her cousins have moved and are taking advantage of her  He was having his weight is garnished for a previous that he says he is better off but they are still taking Monia  Pertinent drug history  Patient has had problems using heroin/fentanyl, pain pills  Patient started using [id number] years ago  He said his brother had  of a heroin overdose  Patient uses it snorting        He says he has not used any illicit opioids    He works at [employer]:   Feels much better on Suboxone no withdrawal symptoms  No urges or cravings  He says he is losing weight  He says if he eats several bites sometimes he gets full easily, nauseated  PHYSICAL EXAM   Blood pressure (!) 109/51, pulse 76, temperature 98.1 °F (36.7 °C), temperature source Tympanic, resp. rate 16, height 6' 1\" (1.854 m), weight 167 lb (75.8 kg).         Mental status examination    Cognition: oriented to person place and time      Appearance: Patient is in no acute distress, normal appearance, patient does not appear intoxicated/    Memory: Normal    Behavior/motor: Normal    Mood: Good mood, upbeat    Affect: Mood congruent    Attitude toward examiner: Pleasant, respectful    Thought content no delusions hallucinations suicidal ideation    Insight: good    Judgment: good    Eyes: Pupils normal    Skin: No track marks noted ,no rashes noted    COWS score: N/A                 URINE DRUG SCREEN TODAY:  Alcohol, Urine NEG    Amphetamine Screen, Urine NEG    Barbiturate Screen, Urine NEG    Benzodiazepine Screen, Urine NEG    Buprenorphine Urine POS    Cocaine Metabolite Screen, Urine NEG    FENTANYL SCREEN, URINE NEG    Gabapentin Screen, Urine N/A    MDMA, Urine NEG    Methadone Screen, Urine NEG    Methamphetamine, Urine NEG    Opiate Scrn, Ur NEG    Oxycodone Screen, Ur NEG    PCP Screen, Urine NEG    Propoxyphene Screen, Urine N/A    Synthetic Cannabinoids (K2) Screen, Urine NEG    THC Screen, Urine POS    Tramadol Scrn, Ur NEG    Tricyclic Antidepressants, Urine N/A             Diagnosis Orders   1. Severe opioid use disorder (HCC)  POCT Rapid Drug Screen    buprenorphine-naloxone (SUBOXONE) 12-3 MG sublingual film      2. Polysubstance abuse ([address]        3. Weight loss        4.  Encounter for monitoring Suboxone maintenance therapy              PLAN:  Patient says he is doing well on this dose  He is having no withdrawal no cravings no side effects from the Suboxone plan will be to continue him on 12 mg films daily for opioid use disorder  I have been seeing him for over a year I do not think counseling is mandatory at this point  Hep C negative  I reviewed the [place] Automated Rx Reporting System report     There does not appear to be any discrepancies or overprescribing of controlled substances    Follow-up 4 weeks  Patient signed up for the recovery cassidy, Reset-O  I reviewed the patient's usage  Patient has lost 17 pounds since April  He says he will eat a half of a meal and feel full

## 2022-11-22 ENCOUNTER — HOSPITAL ENCOUNTER (OUTPATIENT)
Dept: NUCLEAR MEDICINE | Age: 32
Discharge: HOME | End: 2022-11-22
Payer: COMMERCIAL

## 2022-11-22 DIAGNOSIS — R68.81 EARLY SATIETY: ICD-10-CM

## 2022-11-22 DIAGNOSIS — K21.9 GASTROESOPHAGEAL REFLUX DISEASE WITHOUT ESOPHAGITIS: ICD-10-CM

## 2022-11-22 DIAGNOSIS — R63.4 WEIGHT LOSS: ICD-10-CM

## 2022-11-22 PROCEDURE — 3430000000 HC RX DIAGNOSTIC RADIOPHARMACEUTICAL: Performed by: NURSE PRACTITIONER

## 2022-11-22 PROCEDURE — A9541 TC99M SULFUR COLLOID: HCPCS | Performed by: NURSE PRACTITIONER

## 2022-11-22 PROCEDURE — 78264 GASTRIC EMPTYING IMG STUDY: CPT

## 2022-11-22 RX ADMIN — Medication 0.97 MILLICURIE: at 09:14

## 2022-11-30 ENCOUNTER — OFFICE VISIT (OUTPATIENT)
Dept: INTERNAL MEDICINE CLINIC | Age: 32
End: 2022-11-30
Payer: COMMERCIAL

## 2022-11-30 VITALS
TEMPERATURE: 98 F | WEIGHT: 167 LBS | SYSTOLIC BLOOD PRESSURE: 109 MMHG | HEIGHT: 73 IN | HEART RATE: 63 BPM | RESPIRATION RATE: 18 BRPM | DIASTOLIC BLOOD PRESSURE: 55 MMHG | BODY MASS INDEX: 22.13 KG/M2

## 2022-11-30 DIAGNOSIS — F11.20 SEVERE OPIOID USE DISORDER (HCC): Primary | ICD-10-CM

## 2022-11-30 PROCEDURE — G8427 DOCREV CUR MEDS BY ELIG CLIN: HCPCS | Performed by: NURSE PRACTITIONER

## 2022-11-30 PROCEDURE — 4004F PT TOBACCO SCREEN RCVD TLK: CPT | Performed by: NURSE PRACTITIONER

## 2022-11-30 PROCEDURE — 80305 DRUG TEST PRSMV DIR OPT OBS: CPT | Performed by: NURSE PRACTITIONER

## 2022-11-30 PROCEDURE — G8484 FLU IMMUNIZE NO ADMIN: HCPCS | Performed by: NURSE PRACTITIONER

## 2022-11-30 PROCEDURE — 99213 OFFICE O/P EST LOW 20 MIN: CPT | Performed by: NURSE PRACTITIONER

## 2022-11-30 PROCEDURE — G8420 CALC BMI NORM PARAMETERS: HCPCS | Performed by: NURSE PRACTITIONER

## 2022-11-30 RX ORDER — BUPRENORPHINE AND NALOXONE 12; 3 MG/1; MG/1
1 FILM, SOLUBLE BUCCAL; SUBLINGUAL DAILY
Qty: 28 FILM | Refills: 0 | Status: SHIPPED | OUTPATIENT
Start: 2022-11-30 | End: 2022-12-28

## 2022-11-30 RX ORDER — BUPRENORPHINE AND NALOXONE 12; 3 MG/1; MG/1
1 FILM, SOLUBLE BUCCAL; SUBLINGUAL DAILY
Qty: 28 FILM | Refills: 0 | Status: SHIPPED | OUTPATIENT
Start: 2022-11-30 | End: 2022-11-30 | Stop reason: SDUPTHER

## 2022-11-30 ASSESSMENT — ENCOUNTER SYMPTOMS
VOMITING: 0
RHINORRHEA: 0
DIARRHEA: 0
CONSTIPATION: 0
WHEEZING: 0
PHOTOPHOBIA: 0
SINUS PRESSURE: 0
ABDOMINAL DISTENTION: 0
SINUS PAIN: 0
COUGH: 0
SHORTNESS OF BREATH: 0
ABDOMINAL PAIN: 0
BLOOD IN STOOL: 0
NAUSEA: 0
SORE THROAT: 0
TROUBLE SWALLOWING: 0

## 2022-11-30 NOTE — PROGRESS NOTES
[facility]  [address]  Dept: [phone]  Dept Fax: [phone]: [phone]     Visit Date:  11/30/2022    Patient:  Fab Iqbal  YOB: 1990    HPI:     Chief Complaint   Patient presents with    Drug Problem     Bina Whitehead presents today for medical evaluation of severe opioid use disorder    He is a routine MAT patient of Dr. Abhijeet Sue. Last seen him 4 weeks ago    Has been clean over 2 years    Denies any use    Urine positive for buprenorphine and THC    Urges and cravings controlled with suboxone 12 mg daily     Medications    Current Outpatient Medications:     buprenorphine-naloxone (SUBOXONE) 12-3 MG sublingual film, Place 1 Film under the tongue daily for 28 days. , Disp: 28 Film, Rfl: 0    The patient has No Known Allergies. Past Medical History  Bina Whitehead  has no past medical history on file. Past Surgical History  The patient  has no past surgical history on file. Family History  This patient's family history is not on file. Social History  Bina Whitehead  reports that he has been smoking cigarettes. He has been smoking an average of 1 pack per day. He has never used smokeless tobacco. He reports that he does not currently use alcohol. He reports that he does not use drugs. Health Maintenance:    Health Maintenance   Topic Date Due    COVID-19 Vaccine (1) Never done    Varicella vaccine (1 of 2 - 2-dose childhood series) Never done    Pneumococcal 0-64 years Vaccine (1 - PCV) Never done    HIV screen  Never done    Flu vaccine (1) Never done    Depression Screen  10/06/2023    DTaP/Tdap/Td vaccine (6 - Td or Tdap) 02/07/2031    Hib vaccine  Completed    Hepatitis C screen  Completed    Hepatitis A vaccine  Aged Out    Meningococcal (ACWY) vaccine  Aged Out       Subjective:      Review of Systems   Constitutional:  Negative for chills, fatigue and fever. HENT:  Negative for congestion, rhinorrhea, sinus pressure, sinus pain, sore throat, tinnitus and trouble swallowing. Eyes:  Negative for photophobia and visual disturbance. Respiratory:  Negative for cough, shortness of breath and wheezing. Cardiovascular:  Negative for chest pain, palpitations and leg swelling. Gastrointestinal:  Negative for abdominal distention, abdominal pain, blood in stool, constipation, diarrhea, nausea and vomiting. Endocrine: Negative for polydipsia, polyphagia and polyuria. Genitourinary:  Negative for difficulty urinating, dysuria, frequency, hematuria and urgency. Musculoskeletal:  Negative for arthralgias and myalgias. Skin:  Negative for rash and wound. Neurological:  Negative for dizziness, light-headedness and headaches. Psychiatric/Behavioral:  Negative for dysphoric mood and sleep disturbance. The patient is not nervous/anxious. Objective:     BP (!) 109/55 (Site: Right Lower Arm, Position: Sitting)   Pulse 63   Temp 98 °F (36.7 °C) (Tympanic)   Resp 18   Ht 6' 1\" (1.854 m)   Wt 167 lb (75.8 kg)   BMI 22.03 kg/m²     Physical Exam  Vitals reviewed. Constitutional:       General: He is not in acute distress. Appearance: Normal appearance. He is not ill-appearing. HENT:      Head: Normocephalic and atraumatic. Right Ear: External ear normal.      Left Ear: External ear normal.      Nose: Nose normal. No congestion or rhinorrhea. Mouth/Throat:      Mouth: Mucous membranes are moist.   Eyes:      Extraocular Movements: Extraocular movements intact. Conjunctiva/sclera: Conjunctivae normal.      Pupils: Pupils are equal, round, and reactive to light. Pulmonary:      Effort: Pulmonary effort is normal. No respiratory distress. Musculoskeletal:         General: Normal range of motion. Cervical back: Normal range of motion and neck supple. Right lower leg: No edema. Left lower leg: No edema.    Skin:     General: Skin is warm and dry. Neurological:      General: No focal deficit present. Mental Status: He is alert and oriented to person, place, and time. Psychiatric:         Mood and Affect: Mood normal.         Behavior: Behavior normal.         Thought Content: Thought content normal.         Judgment: Judgment normal.       Labs Reviewed 11/30/2022:    Lab Results   Component Value Date    WBC 6.3 05/25/2022    HGB 13.6 (L) 05/25/2022    HCT 40.9 (L) 05/25/2022     (L) 05/25/2022    ALT 18 05/25/2022    AST 22 05/25/2022     05/25/2022    K 4.5 05/25/2022     05/25/2022    CREATININE 1.0 05/25/2022    BUN 20 05/25/2022    CO2 28 05/25/2022    TSH 1.330 05/25/2022       Assessment/Plan      1. Severe opioid use disorder (HCC)    - POCT Rapid Drug Screen  - buprenorphine-naloxone (SUBOXONE) 12-3 MG sublingual film; Place 1 Film under the tongue daily for 28 days. Dispense: 28 Film; Refill: 0  - OARRS reviewed, no discrepancies  - Narcan at home  - Encouraged to attend NA/AA meetings and/or arrange for individualized counseling      Return in about 4 weeks (around 12/28/2022). Patient given educational materials - see patient instructions. Discussed use, benefit, and side effects of prescribed medications. All patient questions answered. Pt voiced understanding. Reviewed health maintenance.        Electronically signed DOMINIC France CNP on 11/30/22 at 9:19 AM EST

## 2022-12-28 ENCOUNTER — OFFICE VISIT (OUTPATIENT)
Dept: INTERNAL MEDICINE CLINIC | Age: 32
End: 2022-12-28
Payer: COMMERCIAL

## 2022-12-28 VITALS
BODY MASS INDEX: 21.77 KG/M2 | HEART RATE: 65 BPM | DIASTOLIC BLOOD PRESSURE: 55 MMHG | WEIGHT: 165 LBS | RESPIRATION RATE: 16 BRPM | SYSTOLIC BLOOD PRESSURE: 108 MMHG

## 2022-12-28 DIAGNOSIS — F11.20 SEVERE OPIOID USE DISORDER (HCC): Primary | ICD-10-CM

## 2022-12-28 PROCEDURE — 4004F PT TOBACCO SCREEN RCVD TLK: CPT | Performed by: NURSE PRACTITIONER

## 2022-12-28 PROCEDURE — G8484 FLU IMMUNIZE NO ADMIN: HCPCS | Performed by: NURSE PRACTITIONER

## 2022-12-28 PROCEDURE — 99213 OFFICE O/P EST LOW 20 MIN: CPT | Performed by: NURSE PRACTITIONER

## 2022-12-28 PROCEDURE — G8420 CALC BMI NORM PARAMETERS: HCPCS | Performed by: NURSE PRACTITIONER

## 2022-12-28 PROCEDURE — G8427 DOCREV CUR MEDS BY ELIG CLIN: HCPCS | Performed by: NURSE PRACTITIONER

## 2022-12-28 PROCEDURE — 80305 DRUG TEST PRSMV DIR OPT OBS: CPT | Performed by: NURSE PRACTITIONER

## 2022-12-28 RX ORDER — BUPRENORPHINE AND NALOXONE 12; 3 MG/1; MG/1
1 FILM, SOLUBLE BUCCAL; SUBLINGUAL DAILY
Qty: 28 FILM | Refills: 0 | Status: SHIPPED | OUTPATIENT
Start: 2022-12-28 | End: 2023-01-25

## 2022-12-28 ASSESSMENT — ENCOUNTER SYMPTOMS
ABDOMINAL PAIN: 0
TROUBLE SWALLOWING: 0
SORE THROAT: 0
ABDOMINAL DISTENTION: 0
RHINORRHEA: 0
CONSTIPATION: 0
SHORTNESS OF BREATH: 0
VOMITING: 0
SINUS PAIN: 0
PHOTOPHOBIA: 0
DIARRHEA: 0
NAUSEA: 0
BLOOD IN STOOL: 0
SINUS PRESSURE: 0
WHEEZING: 0
COUGH: 0

## 2022-12-28 NOTE — PROGRESS NOTES
[facility]  [address]  Dept: [phone]  Dept Fax: [phone]: [phone]     Visit Date:  12/28/2022    Patient:  Sherlyn Hemphill  YOB: 1990    HPI:     Chief Complaint   Patient presents with    Drug Problem     Riley Campos presents today for medical evaluation of severe opioid use disorder     He is a routine MAT patient of Dr. Sveta Sullivan. Last seen him 4 weeks ago     Has been clean over 2 years     Denies any use     Urine positive for buprenorphine and THC     Urges and cravings controlled with suboxone 12 mg daily     5 kids at home- in [place]      Medications    Current Outpatient Medications:     buprenorphine-naloxone (SUBOXONE) 12-3 MG sublingual film, Place 1 Film under the tongue daily for 28 days. , Disp: 28 Film, Rfl: 0    The patient has No Known Allergies. Past Medical History  Riley Campos  has no past medical history on file. Past Surgical History  The patient  has no past surgical history on file. Family History  This patient's family history is not on file. Social History  Riley Campos  reports that he has been smoking cigarettes. He has been smoking an average of 1 pack per day. He has never used smokeless tobacco. He reports that he does not currently use alcohol. He reports that he does not use drugs.     Health Maintenance:    Health Maintenance   Topic Date Due    COVID-19 Vaccine (1) Never done    Varicella vaccine (1 of 2 - 2-dose childhood series) Never done    Pneumococcal 0-64 years Vaccine (1 - PCV) Never done    HIV screen  Never done    Flu vaccine (1) Never done    Depression Screen  10/06/2023    DTaP/Tdap/Td vaccine (6 - Td or Tdap) 02/07/2031    Hib vaccine  Completed    Hepatitis C screen  Completed    Hepatitis A vaccine  Aged Out    Meningococcal (ACWY) vaccine  Aged Out       Subjective:      Review of Systems   Constitutional: Negative for chills, fatigue and fever. HENT:  Negative for congestion, rhinorrhea, sinus pressure, sinus pain, sore throat, tinnitus and trouble swallowing. Eyes:  Negative for photophobia and visual disturbance. Respiratory:  Negative for cough, shortness of breath and wheezing. Cardiovascular:  Negative for chest pain, palpitations and leg swelling. Gastrointestinal:  Negative for abdominal distention, abdominal pain, blood in stool, constipation, diarrhea, nausea and vomiting. Endocrine: Negative for polydipsia, polyphagia and polyuria. Genitourinary:  Negative for difficulty urinating, dysuria, frequency, hematuria and urgency. Musculoskeletal:  Negative for arthralgias and myalgias. Skin:  Negative for rash and wound. Neurological:  Negative for dizziness, light-headedness and headaches. Psychiatric/Behavioral:  Negative for dysphoric mood and sleep disturbance. The patient is not nervous/anxious. Objective:     BP (!) 108/55 (Site: Right Lower Arm, Position: Sitting, Cuff Size: Medium Adult)   Pulse 65   Resp 16   Wt 165 lb (74.8 kg)   BMI 21.77 kg/m²     Physical Exam  Vitals reviewed. Constitutional:       General: He is not in acute distress. Appearance: Normal appearance. He is not ill-appearing. HENT:      Head: Normocephalic and atraumatic. Right Ear: External ear normal.      Left Ear: External ear normal.      Nose: Nose normal. No congestion or rhinorrhea. Mouth/Throat:      Mouth: Mucous membranes are moist.   Eyes:      Extraocular Movements: Extraocular movements intact. Conjunctiva/sclera: Conjunctivae normal.      Pupils: Pupils are equal, round, and reactive to light. Pulmonary:      Effort: Pulmonary effort is normal. No respiratory distress. Musculoskeletal:         General: Normal range of motion. Cervical back: Normal range of motion and neck supple. Right lower leg: No edema. Left lower leg: No edema.    Skin: General: Skin is warm and dry. Neurological:      General: No focal deficit present. Mental Status: He is alert and oriented to person, place, and time. Psychiatric:         Mood and Affect: Mood normal.         Behavior: Behavior normal.         Thought Content: Thought content normal.         Judgment: Judgment normal.       Labs Reviewed 12/28/2022:    Lab Results   Component Value Date    WBC 6.3 05/25/2022    HGB 13.6 (L) 05/25/2022    HCT 40.9 (L) 05/25/2022     (L) 05/25/2022    ALT 18 05/25/2022    AST 22 05/25/2022     05/25/2022    K 4.5 05/25/2022     05/25/2022    CREATININE 1.0 05/25/2022    BUN 20 05/25/2022    CO2 28 05/25/2022    TSH 1.330 05/25/2022       Assessment/Plan      1. Severe opioid use disorder (HCC)    - POCT Rapid Drug Screen  - buprenorphine-naloxone (SUBOXONE) 12-3 MG sublingual film; Place 1 Film under the tongue daily for 28 days. Dispense: 28 Film; Refill: 0  - OARRS reviewed, no discrepancies  - Narcan at home  - Encouraged to attend NA/AA meetings and/or arrange for individualized counseling       Return in about 4 weeks (around 1/25/2023). Patient given educational materials - see patient instructions. Discussed use, benefit, and side effects of prescribed medications. All patient questions answered. Pt voiced understanding. Reviewed health maintenance.        Electronically signed DOMINIC Chavez CNP on 12/28/22 at 9:46 AM EST

## 2023-01-03 DIAGNOSIS — F11.20 SEVERE OPIOID USE DISORDER (HCC): ICD-10-CM

## 2023-01-03 RX ORDER — BUPRENORPHINE AND NALOXONE 12; 3 MG/1; MG/1
1 FILM, SOLUBLE BUCCAL; SUBLINGUAL DAILY
Qty: 23 FILM | Refills: 0 | Status: SHIPPED | OUTPATIENT
Start: 2023-01-03 | End: 2023-01-26

## 2023-01-03 NOTE — TELEPHONE ENCOUNTER
Last visit- 12/28/2022  Next visit- 1/26/2023    Requested Prescriptions     Pending Prescriptions Disp Refills    buprenorphine-naloxone (SUBOXONE) 12-3 MG sublingual film 28 Film 0     Sig: Place 1 Film under the tongue daily for 28 days. Patient called and meds were sent to wrong pharmacy.

## 2023-01-26 ENCOUNTER — OFFICE VISIT (OUTPATIENT)
Dept: INTERNAL MEDICINE CLINIC | Age: 33
End: 2023-01-26
Payer: COMMERCIAL

## 2023-01-26 VITALS
BODY MASS INDEX: 22 KG/M2 | DIASTOLIC BLOOD PRESSURE: 61 MMHG | HEART RATE: 67 BPM | SYSTOLIC BLOOD PRESSURE: 114 MMHG | WEIGHT: 166 LBS | HEIGHT: 73 IN

## 2023-01-26 DIAGNOSIS — F11.20 SEVERE OPIOID DEPENDENCE ON MAINTENANCE THERAPY (HCC): ICD-10-CM

## 2023-01-26 DIAGNOSIS — F11.20 SEVERE OPIOID USE DISORDER (HCC): Primary | ICD-10-CM

## 2023-01-26 PROCEDURE — G8420 CALC BMI NORM PARAMETERS: HCPCS | Performed by: NURSE PRACTITIONER

## 2023-01-26 PROCEDURE — G8428 CUR MEDS NOT DOCUMENT: HCPCS | Performed by: NURSE PRACTITIONER

## 2023-01-26 PROCEDURE — 4004F PT TOBACCO SCREEN RCVD TLK: CPT | Performed by: NURSE PRACTITIONER

## 2023-01-26 PROCEDURE — 99214 OFFICE O/P EST MOD 30 MIN: CPT | Performed by: NURSE PRACTITIONER

## 2023-01-26 PROCEDURE — 80305 DRUG TEST PRSMV DIR OPT OBS: CPT | Performed by: NURSE PRACTITIONER

## 2023-01-26 PROCEDURE — G8484 FLU IMMUNIZE NO ADMIN: HCPCS | Performed by: NURSE PRACTITIONER

## 2023-01-26 RX ORDER — BUPRENORPHINE AND NALOXONE 12; 3 MG/1; MG/1
1 FILM, SOLUBLE BUCCAL; SUBLINGUAL DAILY
Qty: 28 FILM | Refills: 0 | Status: SHIPPED | OUTPATIENT
Start: 2023-01-26 | End: 2023-02-23

## 2023-01-26 ASSESSMENT — ENCOUNTER SYMPTOMS
TROUBLE SWALLOWING: 0
NAUSEA: 0
ABDOMINAL DISTENTION: 0
PHOTOPHOBIA: 0
COUGH: 0
WHEEZING: 0
VOMITING: 0
SORE THROAT: 0
CONSTIPATION: 0
BLOOD IN STOOL: 0
RHINORRHEA: 0
SHORTNESS OF BREATH: 0
DIARRHEA: 0
SINUS PAIN: 0
ABDOMINAL PAIN: 0
SINUS PRESSURE: 0

## 2023-02-23 ENCOUNTER — OFFICE VISIT (OUTPATIENT)
Dept: INTERNAL MEDICINE CLINIC | Age: 33
End: 2023-02-23

## 2023-02-23 VITALS
HEART RATE: 68 BPM | RESPIRATION RATE: 16 BRPM | SYSTOLIC BLOOD PRESSURE: 101 MMHG | BODY MASS INDEX: 21.34 KG/M2 | HEIGHT: 73 IN | WEIGHT: 161 LBS | DIASTOLIC BLOOD PRESSURE: 54 MMHG

## 2023-02-23 DIAGNOSIS — F11.20 SEVERE OPIOID USE DISORDER (HCC): Primary | ICD-10-CM

## 2023-02-23 DIAGNOSIS — F11.20 SEVERE OPIOID DEPENDENCE ON MAINTENANCE THERAPY (HCC): ICD-10-CM

## 2023-02-23 RX ORDER — BUPRENORPHINE AND NALOXONE 12; 3 MG/1; MG/1
1 FILM, SOLUBLE BUCCAL; SUBLINGUAL DAILY
Qty: 28 FILM | Refills: 0 | Status: SHIPPED | OUTPATIENT
Start: 2023-02-23 | End: 2023-03-23

## 2023-02-23 ASSESSMENT — ENCOUNTER SYMPTOMS
ABDOMINAL PAIN: 0
TROUBLE SWALLOWING: 0
SINUS PRESSURE: 0
ABDOMINAL DISTENTION: 0
SHORTNESS OF BREATH: 0
BLOOD IN STOOL: 0
WHEEZING: 0
COUGH: 0
DIARRHEA: 0
CONSTIPATION: 0
RHINORRHEA: 0
NAUSEA: 0
PHOTOPHOBIA: 0
VOMITING: 0
SINUS PAIN: 0
SORE THROAT: 0

## 2023-02-23 NOTE — PROGRESS NOTES
[facility]  [address]  Dept: [phone]  Dept Fax: [phone]: [phone]     Visit Date:  2/23/2023    Patient:  Kenny Baptiste  YOB: 1990    HPI:     Chief Complaint   Patient presents with    Drug Problem     Jake Banks presents today for medical evaluation of severe opioid use disorder     He is a routine MAT patient of Dr. Marichuy Childers. Last seen him 4 weeks ago     Has been clean over 2 years     Denies any use     Urine positive for buprenorphine and THC     Urges and cravings controlled with suboxone 12 mg daily      5 kids at home- in [place]     Medications    Current Outpatient Medications:     buprenorphine-naloxone (SUBOXONE) 12-3 MG sublingual film, Place 1 Film under the tongue daily for 28 days. Max Daily Amount: 1 Film, Disp: 28 Film, Rfl: 0    The patient has No Known Allergies. Past Medical History  Jake Banks  has no past medical history on file. Past Surgical History  The patient  has no past surgical history on file. Family History  This patient's family history is not on file. Social History  Jake Banks  reports that he has been smoking cigarettes. He has been smoking an average of 1 pack per day. He has never used smokeless tobacco. He reports that he does not currently use alcohol. He reports that he does not use drugs.     Health Maintenance:    Health Maintenance   Topic Date Due    Flu vaccine (1) 01/26/2024 (Originally 8/1/2022)    Pneumococcal 0-64 years Vaccine (1 - PCV) 05/06/2024 (Originally 2/2/1996)    COVID-19 Vaccine (1) 05/06/2024 (Originally 1990)    Depression Screen  10/06/2023    DTaP/Tdap/Td vaccine (6 - Td or Tdap) 02/07/2031    Hib vaccine  Completed    Hepatitis C screen  Completed    Hepatitis A vaccine  Aged Out    Meningococcal (ACWY) vaccine  Aged Out    Varicella vaccine  Discontinued    HIV screen  Discontinued Subjective:      Review of Systems   Constitutional:  Negative for chills, fatigue and fever. HENT:  Negative for congestion, rhinorrhea, sinus pressure, sinus pain, sore throat, tinnitus and trouble swallowing. Eyes:  Negative for photophobia and visual disturbance. Respiratory:  Negative for cough, shortness of breath and wheezing. Cardiovascular:  Negative for chest pain, palpitations and leg swelling. Gastrointestinal:  Negative for abdominal distention, abdominal pain, blood in stool, constipation, diarrhea, nausea and vomiting. Endocrine: Negative for polydipsia, polyphagia and polyuria. Genitourinary:  Negative for difficulty urinating, dysuria, frequency, hematuria and urgency. Musculoskeletal:  Negative for arthralgias and myalgias. Skin:  Negative for rash and wound. Neurological:  Negative for dizziness, light-headedness and headaches. Psychiatric/Behavioral:  Negative for dysphoric mood and sleep disturbance. The patient is not nervous/anxious. Objective:     BP (!) 101/54 (Site: Left Lower Arm, Position: Sitting, Cuff Size: Medium Adult)   Pulse 68   Resp 16   Ht 6' 1\" (1.854 m)   Wt 161 lb (73 kg)   BMI 21.24 kg/m²     Physical Exam  Vitals reviewed. Constitutional:       General: He is not in acute distress. Appearance: Normal appearance. He is not ill-appearing. HENT:      Head: Normocephalic and atraumatic. Right Ear: External ear normal.      Left Ear: External ear normal.      Nose: Nose normal. No congestion or rhinorrhea. Mouth/Throat:      Mouth: Mucous membranes are moist.   Eyes:      Extraocular Movements: Extraocular movements intact. Conjunctiva/sclera: Conjunctivae normal.      Pupils: Pupils are equal, round, and reactive to light. Pulmonary:      Effort: Pulmonary effort is normal. No respiratory distress. Musculoskeletal:         General: Normal range of motion.       Cervical back: Normal range of motion and neck supple. Right lower leg: No edema. Left lower leg: No edema. Skin:     General: Skin is warm and dry. Neurological:      General: No focal deficit present. Mental Status: He is alert and oriented to person, place, and time. Psychiatric:         Mood and Affect: Mood normal.         Behavior: Behavior normal.         Thought Content: Thought content normal.         Judgment: Judgment normal.       Labs Reviewed 2/23/2023:    Lab Results   Component Value Date    WBC 6.3 05/25/2022    HGB 13.6 (L) 05/25/2022    HCT 40.9 (L) 05/25/2022     (L) 05/25/2022    ALT 18 05/25/2022    AST 22 05/25/2022     05/25/2022    K 4.5 05/25/2022     05/25/2022    CREATININE 1.0 05/25/2022    BUN 20 05/25/2022    CO2 28 05/25/2022    TSH 1.330 05/25/2022       Assessment/Plan      1. Severe opioid use disorder (HCC)    - POCT Rapid Drug Screen  - buprenorphine-naloxone (SUBOXONE) 12-3 MG sublingual film; Place 1 Film under the tongue daily for 28 days. Max Daily Amount: 1 Film  Dispense: 28 Film; Refill: 0    2. Severe opioid dependence on maintenance therapy ([address]    - OARRS reviewed, no discrepancies  - Narcan at home  - Encouraged to attend NA/AA meetings and/or arrange for individualized counseling      Return in about 4 weeks (around 3/23/2023). Patient given educational materials - see patient instructions. Discussed use, benefit, and side effects of prescribed medications. All patient questions answered. Pt voiced understanding. Reviewed health maintenance.        Electronically signed DOMINIC Guzman - CNP on 2/23/23 at 9:05 AM EST

## 2023-03-23 ENCOUNTER — OFFICE VISIT (OUTPATIENT)
Dept: INTERNAL MEDICINE CLINIC | Age: 33
End: 2023-03-23
Payer: COMMERCIAL

## 2023-03-23 VITALS
HEART RATE: 84 BPM | WEIGHT: 170 LBS | HEIGHT: 73 IN | SYSTOLIC BLOOD PRESSURE: 110 MMHG | BODY MASS INDEX: 22.53 KG/M2 | DIASTOLIC BLOOD PRESSURE: 62 MMHG

## 2023-03-23 DIAGNOSIS — F11.20 SEVERE OPIOID USE DISORDER (HCC): Primary | ICD-10-CM

## 2023-03-23 PROCEDURE — G8428 CUR MEDS NOT DOCUMENT: HCPCS | Performed by: NURSE PRACTITIONER

## 2023-03-23 PROCEDURE — G8484 FLU IMMUNIZE NO ADMIN: HCPCS | Performed by: NURSE PRACTITIONER

## 2023-03-23 PROCEDURE — 99213 OFFICE O/P EST LOW 20 MIN: CPT | Performed by: NURSE PRACTITIONER

## 2023-03-23 PROCEDURE — G8420 CALC BMI NORM PARAMETERS: HCPCS | Performed by: NURSE PRACTITIONER

## 2023-03-23 PROCEDURE — 80305 DRUG TEST PRSMV DIR OPT OBS: CPT | Performed by: NURSE PRACTITIONER

## 2023-03-23 PROCEDURE — 4004F PT TOBACCO SCREEN RCVD TLK: CPT | Performed by: NURSE PRACTITIONER

## 2023-03-23 RX ORDER — BUPRENORPHINE AND NALOXONE 12; 3 MG/1; MG/1
1 FILM, SOLUBLE BUCCAL; SUBLINGUAL DAILY
Qty: 28 FILM | Refills: 0 | Status: SHIPPED | OUTPATIENT
Start: 2023-03-23 | End: 2023-04-20

## 2023-03-23 ASSESSMENT — ENCOUNTER SYMPTOMS
NAUSEA: 0
DIARRHEA: 0
COUGH: 0
VOMITING: 0
RHINORRHEA: 0
PHOTOPHOBIA: 0
TROUBLE SWALLOWING: 0
SINUS PRESSURE: 0
WHEEZING: 0
BLOOD IN STOOL: 0
SHORTNESS OF BREATH: 0
CONSTIPATION: 0
SORE THROAT: 0
ABDOMINAL PAIN: 0
SINUS PAIN: 0
ABDOMINAL DISTENTION: 0

## 2023-03-23 NOTE — PROGRESS NOTES
Subjective:      Review of Systems   Constitutional:  Negative for chills, fatigue and fever. HENT:  Negative for congestion, rhinorrhea, sinus pressure, sinus pain, sore throat, tinnitus and trouble swallowing. Eyes:  Negative for photophobia and visual disturbance. Respiratory:  Negative for cough, shortness of breath and wheezing. Cardiovascular:  Negative for chest pain, palpitations and leg swelling. Gastrointestinal:  Negative for abdominal distention, abdominal pain, blood in stool, constipation, diarrhea, nausea and vomiting. Endocrine: Negative for polydipsia, polyphagia and polyuria. Genitourinary:  Negative for difficulty urinating, dysuria, frequency, hematuria and urgency. Musculoskeletal:  Negative for arthralgias and myalgias. Skin:  Negative for rash and wound. Neurological:  Negative for dizziness, light-headedness and headaches. Psychiatric/Behavioral:  Negative for dysphoric mood and sleep disturbance. The patient is not nervous/anxious. Objective:     /62 (Site: Right Lower Arm, Position: Sitting, Cuff Size: Medium Adult)   Pulse 84   Ht 6' 1\" (1.854 m)   Wt 170 lb (77.1 kg)   BMI 22.43 kg/m²     Physical Exam  Vitals reviewed. Constitutional:       General: He is not in acute distress. Appearance: Normal appearance. He is not ill-appearing. HENT:      Head: Normocephalic and atraumatic. Right Ear: External ear normal.      Left Ear: External ear normal.      Nose: Nose normal. No congestion or rhinorrhea. Mouth/Throat:      Mouth: Mucous membranes are moist.   Eyes:      Extraocular Movements: Extraocular movements intact. Conjunctiva/sclera: Conjunctivae normal.      Pupils: Pupils are equal, round, and reactive to light. Pulmonary:      Effort: Pulmonary effort is normal. No respiratory distress. Musculoskeletal:         General: Normal range of motion. Cervical back: Normal range of motion and neck supple.       Right

## 2023-04-20 ENCOUNTER — OFFICE VISIT (OUTPATIENT)
Dept: INTERNAL MEDICINE CLINIC | Age: 33
End: 2023-04-20

## 2023-04-20 VITALS
HEART RATE: 70 BPM | BODY MASS INDEX: 21.47 KG/M2 | RESPIRATION RATE: 16 BRPM | WEIGHT: 162 LBS | HEIGHT: 73 IN | SYSTOLIC BLOOD PRESSURE: 113 MMHG | DIASTOLIC BLOOD PRESSURE: 68 MMHG

## 2023-04-20 DIAGNOSIS — Z79.891 ENCOUNTER FOR MONITORING OPIOID MAINTENANCE THERAPY: ICD-10-CM

## 2023-04-20 DIAGNOSIS — Z51.81 ENCOUNTER FOR MONITORING OPIOID MAINTENANCE THERAPY: ICD-10-CM

## 2023-04-20 DIAGNOSIS — F11.20 SEVERE OPIOID USE DISORDER (HCC): Primary | ICD-10-CM

## 2023-04-20 RX ORDER — BUPRENORPHINE AND NALOXONE 12; 3 MG/1; MG/1
1 FILM, SOLUBLE BUCCAL; SUBLINGUAL DAILY
Qty: 28 FILM | Refills: 0 | Status: SHIPPED | OUTPATIENT
Start: 2023-04-20 | End: 2023-05-18

## 2023-04-20 ASSESSMENT — ENCOUNTER SYMPTOMS
SORE THROAT: 0
DIARRHEA: 0
SINUS PAIN: 0
NAUSEA: 0
BLOOD IN STOOL: 0
SINUS PRESSURE: 0
RHINORRHEA: 0
VOMITING: 0
CONSTIPATION: 0
SHORTNESS OF BREATH: 0
ABDOMINAL PAIN: 0
ABDOMINAL DISTENTION: 0
TROUBLE SWALLOWING: 0
PHOTOPHOBIA: 0
WHEEZING: 0
COUGH: 0

## 2023-04-20 NOTE — PROGRESS NOTES
[facility]  [address]  Dept: [phone]  Dept Fax: [phone]: [phone]     Visit Date:  4/20/2023    Patient:  Elisabeth Herrera  YOB: 1990    HPI:     Chief Complaint   Patient presents with    Drug Problem     Steven Ta presents today for medical evaluation of severe opioid use disorder     He is a routine MAT patient of Dr. Brandon Rolon. Last seen him 4 weeks ago     Has been clean over 2 years     Denies any use     Urine positive for buprenorphine and THC     Urges and cravings controlled with suboxone 12 mg daily      5 kids at home- in [place]      Medications    Current Outpatient Medications:     buprenorphine-naloxone (SUBOXONE) 12-3 MG sublingual film, Place 1 Film under the tongue daily for 28 days. Max Daily Amount: 1 Film, Disp: 28 Film, Rfl: 0    The patient has No Known Allergies. Past Medical History  Steven Ta  has no past medical history on file. Past Surgical History  The patient  has no past surgical history on file. Family History  This patient's family history is not on file. Social History  Steven Ta  reports that he has been smoking cigarettes. He has been smoking an average of 1 pack per day. He has never used smokeless tobacco. He reports that he does not currently use alcohol. He reports that he does not use drugs.     Health Maintenance:    Health Maintenance   Topic Date Due    Flu vaccine (Season Ended) 01/26/2024 (Originally 8/1/2023)    Pneumococcal 0-64 years Vaccine (1 - PCV) 05/06/2024 (Originally 2/2/1996)    COVID-19 Vaccine (1) 05/06/2024 (Originally 1990)    Depression Screen  10/06/2023    DTaP/Tdap/Td vaccine (6 - Td or Tdap) 02/07/2031    Hib vaccine  Completed    Hepatitis C screen  Completed    Hepatitis A vaccine  Aged Out    Meningococcal (ACWY) vaccine  Aged Out    Hepatitis B vaccine  Discontinued    Varicella vaccine

## 2023-05-18 ENCOUNTER — OFFICE VISIT (OUTPATIENT)
Dept: INTERNAL MEDICINE CLINIC | Age: 33
End: 2023-05-18
Payer: COMMERCIAL

## 2023-05-18 VITALS
RESPIRATION RATE: 18 BRPM | WEIGHT: 169 LBS | HEIGHT: 73 IN | DIASTOLIC BLOOD PRESSURE: 62 MMHG | HEART RATE: 65 BPM | BODY MASS INDEX: 22.4 KG/M2 | SYSTOLIC BLOOD PRESSURE: 122 MMHG

## 2023-05-18 DIAGNOSIS — F11.20 SEVERE OPIOID USE DISORDER (HCC): ICD-10-CM

## 2023-05-18 DIAGNOSIS — Z79.891 ENCOUNTER FOR MONITORING OPIOID MAINTENANCE THERAPY: Primary | ICD-10-CM

## 2023-05-18 DIAGNOSIS — Z51.81 ENCOUNTER FOR MONITORING OPIOID MAINTENANCE THERAPY: Primary | ICD-10-CM

## 2023-05-18 PROCEDURE — 80305 DRUG TEST PRSMV DIR OPT OBS: CPT | Performed by: NURSE PRACTITIONER

## 2023-05-18 PROCEDURE — G8427 DOCREV CUR MEDS BY ELIG CLIN: HCPCS | Performed by: NURSE PRACTITIONER

## 2023-05-18 PROCEDURE — 99214 OFFICE O/P EST MOD 30 MIN: CPT | Performed by: NURSE PRACTITIONER

## 2023-05-18 PROCEDURE — G8420 CALC BMI NORM PARAMETERS: HCPCS | Performed by: NURSE PRACTITIONER

## 2023-05-18 PROCEDURE — 4004F PT TOBACCO SCREEN RCVD TLK: CPT | Performed by: NURSE PRACTITIONER

## 2023-05-18 RX ORDER — BUPRENORPHINE AND NALOXONE 12; 3 MG/1; MG/1
1 FILM, SOLUBLE BUCCAL; SUBLINGUAL DAILY
Qty: 28 FILM | Refills: 0 | Status: SHIPPED | OUTPATIENT
Start: 2023-05-18 | End: 2023-06-15

## 2023-05-18 ASSESSMENT — ENCOUNTER SYMPTOMS
COUGH: 0
NAUSEA: 0
ABDOMINAL DISTENTION: 0
VOMITING: 0
RHINORRHEA: 0
SINUS PAIN: 0
PHOTOPHOBIA: 0
BLOOD IN STOOL: 0
SINUS PRESSURE: 0
CONSTIPATION: 0
DIARRHEA: 0
SORE THROAT: 0
SHORTNESS OF BREATH: 0
WHEEZING: 0
ABDOMINAL PAIN: 0
TROUBLE SWALLOWING: 0

## 2023-05-18 NOTE — PROGRESS NOTES
Cervical back: Normal range of motion and neck supple. Right lower leg: No edema. Left lower leg: No edema. Skin:     General: Skin is warm and dry. Neurological:      General: No focal deficit present. Mental Status: He is alert and oriented to person, place, and time. Psychiatric:         Mood and Affect: Mood normal.         Behavior: Behavior normal.         Thought Content: Thought content normal.         Judgment: Judgment normal.       Labs Reviewed 5/18/2023:    Lab Results   Component Value Date    WBC 6.3 05/25/2022    HGB 13.6 (L) 05/25/2022    HCT 40.9 (L) 05/25/2022     (L) 05/25/2022    ALT 18 05/25/2022    AST 22 05/25/2022     05/25/2022    K 4.5 05/25/2022     05/25/2022    CREATININE 1.0 05/25/2022    BUN 20 05/25/2022    CO2 28 05/25/2022    TSH 1.330 05/25/2022       Assessment/Plan      1. Encounter for monitoring opioid maintenance therapy    - OARRS reviewed, no discrepancies  - Narcan at home  - Encouraged to attend NA/AA meetings and/or arrange for individualized counseling    2. Severe opioid use disorder (HCC)    - POCT Rapid Drug Screen  - buprenorphine-naloxone (SUBOXONE) 12-3 MG sublingual film; Place 1 Film under the tongue daily for 28 days. Max Daily Amount: 1 Film  Dispense: 28 Film; Refill: 0      Return in about 4 weeks (around 6/15/2023), or nurse visit. Patient given educational materials - see patient instructions. Discussed use, benefit, and side effects of prescribed medications. All patient questions answered. Pt voiced understanding. Reviewed health maintenance.        Electronically signed DOMINIC Pruitt - CNP on 5/18/23 at 9:51 AM EDT

## 2023-05-27 ENCOUNTER — APPOINTMENT (OUTPATIENT)
Dept: CT IMAGING | Age: 33
End: 2023-05-27
Payer: COMMERCIAL

## 2023-05-27 ENCOUNTER — HOSPITAL ENCOUNTER (EMERGENCY)
Age: 33
Discharge: HOME | End: 2023-05-27
Attending: EMERGENCY MEDICINE
Payer: COMMERCIAL

## 2023-05-27 VITALS
TEMPERATURE: 98.2 F | SYSTOLIC BLOOD PRESSURE: 147 MMHG | RESPIRATION RATE: 18 BRPM | DIASTOLIC BLOOD PRESSURE: 75 MMHG | OXYGEN SATURATION: 100 % | HEART RATE: 67 BPM

## 2023-05-27 DIAGNOSIS — R10.33 PERIUMBILICAL ABDOMINAL PAIN: Primary | ICD-10-CM

## 2023-05-27 PROBLEM — R93.89 NONSPECIFIC ABNORMAL FINDINGS ON DIAGNOSTIC IMAGING: Status: ACTIVE | Noted: 2023-05-27

## 2023-05-27 PROBLEM — R10.30 LOWER ABDOMINAL PAIN OF UNKNOWN ETIOLOGY: Status: ACTIVE | Noted: 2023-05-27

## 2023-05-27 LAB
ALBUMIN SERPL BCG-MCNC: 4.7 G/DL (ref 3.5–5.1)
ALP SERPL-CCNC: 67 U/L (ref 38–126)
ALT SERPL W/O P-5'-P-CCNC: 25 U/L (ref 11–66)
ANION GAP SERPL CALC-SCNC: 10 MEQ/L (ref 8–16)
AST SERPL-CCNC: 25 U/L (ref 5–40)
BASOPHILS ABSOLUTE: 0 THOU/MM3 (ref 0–0.1)
BASOPHILS NFR BLD AUTO: 0.3 %
BILIRUB SERPL-MCNC: 0.3 MG/DL (ref 0.3–1.2)
BUN SERPL-MCNC: 17 MG/DL (ref 7–22)
CALCIUM SERPL-MCNC: 9.7 MG/DL (ref 8.5–10.5)
CHLORIDE SERPL-SCNC: 102 MEQ/L (ref 98–111)
CO2 SERPL-SCNC: 29 MEQ/L (ref 23–33)
CREAT SERPL-MCNC: 0.9 MG/DL (ref 0.4–1.2)
DEPRECATED RDW RBC AUTO: 43.8 FL (ref 35–45)
EOSINOPHIL NFR BLD AUTO: 1.4 %
EOSINOPHILS ABSOLUTE: 0.1 THOU/MM3 (ref 0–0.4)
ERYTHROCYTE [DISTWIDTH] IN BLOOD BY AUTOMATED COUNT: 12.6 % (ref 11.5–14.5)
GFR SERPL CREATININE-BSD FRML MDRD: > 60 ML/MIN/1.73M2
GLUCOSE SERPL-MCNC: 117 MG/DL (ref 70–108)
HCT VFR BLD AUTO: 45.2 % (ref 42–52)
HGB BLD-MCNC: 14.5 GM/DL (ref 14–18)
IMM GRANULOCYTES # BLD AUTO: 0.02 THOU/MM3 (ref 0–0.07)
IMM GRANULOCYTES NFR BLD AUTO: 0.2 %
LIPASE SERPL-CCNC: 18.3 U/L (ref 5.6–51.3)
LYMPHOCYTES ABSOLUTE: 1.5 THOU/MM3 (ref 1–4.8)
LYMPHOCYTES NFR BLD AUTO: 17.2 %
MCH RBC QN AUTO: 30.2 PG (ref 26–33)
MCHC RBC AUTO-ENTMCNC: 32.1 GM/DL (ref 32.2–35.5)
MCV RBC AUTO: 94.2 FL (ref 80–94)
MONOCYTES ABSOLUTE: 0.4 THOU/MM3 (ref 0.4–1.3)
MONOCYTES NFR BLD AUTO: 5.1 %
NEUTROPHILS NFR BLD AUTO: 75.8 %
NRBC BLD AUTO-RTO: 0 /100 WBC
OSMOLALITY SERPL CALC.SUM OF ELEC: 283.8 MOSMOL/KG (ref 275–300)
PLATELET # BLD AUTO: 124 THOU/MM3 (ref 130–400)
PMV BLD AUTO: 14 FL (ref 9.4–12.4)
POTASSIUM SERPL-SCNC: 4.5 MEQ/L (ref 3.5–5.2)
PROT SERPL-MCNC: 8.1 G/DL (ref 6.1–8)
RBC # BLD AUTO: 4.8 MILL/MM3 (ref 4.7–6.1)
SEGMENTED NEUTROPHILS ABSOLUTE COUNT: 6.7 THOU/MM3 (ref 1.8–7.7)
SODIUM SERPL-SCNC: 141 MEQ/L (ref 135–145)
WBC # BLD AUTO: 8.8 THOU/MM3 (ref 4.8–10.8)

## 2023-05-27 PROCEDURE — 6370000000 HC RX 637 (ALT 250 FOR IP): Performed by: STUDENT IN AN ORGANIZED HEALTH CARE EDUCATION/TRAINING PROGRAM

## 2023-05-27 PROCEDURE — 6360000002 HC RX W HCPCS: Performed by: STUDENT IN AN ORGANIZED HEALTH CARE EDUCATION/TRAINING PROGRAM

## 2023-05-27 PROCEDURE — 83690 ASSAY OF LIPASE: CPT

## 2023-05-27 PROCEDURE — 74177 CT ABD & PELVIS W/CONTRAST: CPT

## 2023-05-27 PROCEDURE — 6360000002 HC RX W HCPCS: Performed by: EMERGENCY MEDICINE

## 2023-05-27 PROCEDURE — 96365 THER/PROPH/DIAG IV INF INIT: CPT

## 2023-05-27 PROCEDURE — 96375 TX/PRO/DX INJ NEW DRUG ADDON: CPT

## 2023-05-27 PROCEDURE — 99285 EMERGENCY DEPT VISIT HI MDM: CPT

## 2023-05-27 PROCEDURE — 80053 COMPREHEN METABOLIC PANEL: CPT

## 2023-05-27 PROCEDURE — 99283 EMERGENCY DEPT VISIT LOW MDM: CPT | Performed by: SURGERY

## 2023-05-27 PROCEDURE — 85025 COMPLETE CBC W/AUTO DIFF WBC: CPT

## 2023-05-27 PROCEDURE — 2580000003 HC RX 258: Performed by: STUDENT IN AN ORGANIZED HEALTH CARE EDUCATION/TRAINING PROGRAM

## 2023-05-27 PROCEDURE — 6360000004 HC RX CONTRAST MEDICATION: Performed by: STUDENT IN AN ORGANIZED HEALTH CARE EDUCATION/TRAINING PROGRAM

## 2023-05-27 PROCEDURE — 36415 COLL VENOUS BLD VENIPUNCTURE: CPT

## 2023-05-27 PROCEDURE — 87086 URINE CULTURE/COLONY COUNT: CPT

## 2023-05-27 RX ORDER — DROPERIDOL 2.5 MG/ML
1.25 INJECTION, SOLUTION INTRAMUSCULAR; INTRAVENOUS ONCE
Status: COMPLETED | OUTPATIENT
Start: 2023-05-27 | End: 2023-05-27

## 2023-05-27 RX ORDER — AMOXICILLIN AND CLAVULANATE POTASSIUM 875; 125 MG/1; MG/1
1 TABLET, FILM COATED ORAL 2 TIMES DAILY
Qty: 14 TABLET | Refills: 0 | Status: SHIPPED | OUTPATIENT
Start: 2023-05-27 | End: 2023-06-03

## 2023-05-27 RX ORDER — 0.9 % SODIUM CHLORIDE 0.9 %
1000 INTRAVENOUS SOLUTION INTRAVENOUS ONCE
Status: COMPLETED | OUTPATIENT
Start: 2023-05-27 | End: 2023-05-27

## 2023-05-27 RX ORDER — ACETAMINOPHEN 500 MG
1000 TABLET ORAL ONCE
Status: COMPLETED | OUTPATIENT
Start: 2023-05-27 | End: 2023-05-27

## 2023-05-27 RX ADMIN — ACETAMINOPHEN 1000 MG: 500 TABLET ORAL at 16:41

## 2023-05-27 RX ADMIN — DROPERIDOL 1.25 MG: 2.5 INJECTION, SOLUTION INTRAMUSCULAR; INTRAVENOUS at 16:59

## 2023-05-27 RX ADMIN — IOPAMIDOL 80 ML: 755 INJECTION, SOLUTION INTRAVENOUS at 17:26

## 2023-05-27 RX ADMIN — SODIUM CHLORIDE 1000 ML: 9 INJECTION, SOLUTION INTRAVENOUS at 16:54

## 2023-05-27 RX ADMIN — CEFTRIAXONE SODIUM 1000 MG: 1 INJECTION, POWDER, FOR SOLUTION INTRAMUSCULAR; INTRAVENOUS at 18:18

## 2023-05-27 ASSESSMENT — PAIN SCALES - GENERAL
PAINLEVEL_OUTOF10: 9
PAINLEVEL_OUTOF10: 8

## 2023-05-27 ASSESSMENT — PAIN - FUNCTIONAL ASSESSMENT: PAIN_FUNCTIONAL_ASSESSMENT: 0-10

## 2023-05-27 NOTE — ED TRIAGE NOTES
Pt presents to the ED with c/o abdominal pain. Pt reports this started last night at work and has gradually gotten worse. Pt reports he did not take anything for pain. Pt states he is on suboxone and if given pain medications would only like very mild pain medications. Pt rates pain 8/10 at this time. VSS.

## 2023-05-27 NOTE — CONSULTS
[facility]  General Surgery Consult Note  Dr. Kathryn Clark MD  Pt Name: Cheri Jurado  MRN: [id number]  [place]: 1990  Date of evaluation: 5/27/2023  Primary Care Physician: Francisco Gomez  Patient evaluated at the request of  Dr. Winston Collins   Reason for evaluation: abdominal pain and questionable CT scan reading  IMPRESSIONS:     Active Hospital Problems    Diagnosis Date Noted    Lower abdominal pain of unknown etiology [R10.30] 05/27/2023    Nonspecific abnormal findings on diagnostic imaging [R93.89] 05/27/2023   To my review he has dilated fluid-filled ileal loops located left lower quadrant and centrally. The nondilated appendix  Lower density of the proximal appendix without appendiceal dilatation of uncertain significance  White count is normal  Other labs reassuring  Exam wise he describes his tenderness over the entire lower abdomen he was more tender in the left than he was in the right in fact he did not really any describe any tenderness when I palpated in the right lower quadrant  PLANS:   Clinically he does not appear to have appendicitis to me and does not have enough findings that would even believe he needs to be admitted for observation  We did discuss that appendicitis can be treated with antibiotics think this was the antibiotics but if he felt inclined that he wanted to do something he could be placed on antibiotics 1 week  Also discussed with him that appendicitis likely disease or some get sick to get sicker and then present with acute appendicitis. It is possible that he may develop increasing symptoms and if so was to return to the emergency department this was discussed with the patient and his significant other who was at the bedside  SUBJECTIVE:   History of Chief Complaint:    Cheri Jurado is a 35 y.o. male who presents with abdominal pain. .  States it started while he was at work last night gradually got worse he has not taken

## 2023-05-27 NOTE — DISCHARGE INSTRUCTIONS
You are seen in the emergency department for abdominal pain. The findings on your CT suggest a possible early appendicitis however at this time, your labs and exam do not suggest this. We did give you antibiotics in the emergency department as a precaution. Also discharging you on 7 days of antibiotics. You will need to follow-up tomorrow morning to this emergency department as this is a holiday weekend and no offices are open, for repeat examination to reassess possibility of appendicitis.

## 2023-05-27 NOTE — ED PROVIDER NOTES
[facility] [address] [address] EMERGENCY DEPT    Pt Name: Melany Sims  MRN: [id number]  [place] 1990  Date of evaluation: 5/27/2023  Resident Physician: Zahraa Martínez MD  Supervising Physician: Dr. Noel Hallman, DO    [facility]       Chief Complaint   Patient presents with    Abdominal Pain       HISTORY OF PRESENT ILLNESS   Melany Sims is a 35 y.o. male with PMHx of opioid use disorder 2 years clean, GERD, delayed gastric emptying  who presents to the emergency department for evaluation of abdominal pain. Patient states that he had a sudden onset of periumbilical abdominal pain at 6 AM.  Patient states that the pain radiates into his back. Pain is currently 8 out of 10, sharp and stabbing in nature but is constant. Patient had 1 episode of emesis earlier today. Currently denies being nauseous. Patient did not take anything for pain prior to arrival.    Patient states that he is on Suboxone. Pt also admits to daily marijuana use. Patient denies any fevers or chills. Denies any dysuria, urgency or frequency. Denies any penile discharge. Denies any previous abdominal surgical history    The patient has no other acute complaints at this time. Review of Systems    Negative Except as Documented Above. PASTMEDICAL HISTORY   No past medical history on file. Patient Active Problem List   Diagnosis Code    Lower abdominal pain of unknown etiology R10.30    Nonspecific abnormal findings on diagnostic imaging R93.89     SURGICAL HISTORY     No past surgical history on file. CURRENT MEDICATIONS       Discharge Medication List as of 5/27/2023  7:04 PM        CONTINUE these medications which have NOT CHANGED    Details   buprenorphine-naloxone (SUBOXONE) 12-3 MG sublingual film Place 1 Film under the tongue daily for 28 days. Max Daily Amount: 1 Film, Disp-28 Film, R-0Normal             ALLERGIES     has No Known Allergies. FAMILY HISTORY     has no family status information on file.         SOCIAL

## 2023-05-28 ENCOUNTER — HOSPITAL ENCOUNTER (EMERGENCY)
Age: 33
Discharge: HOME | End: 2023-05-28
Attending: FAMILY MEDICINE
Payer: COMMERCIAL

## 2023-05-28 VITALS
BODY MASS INDEX: 23.03 KG/M2 | HEART RATE: 90 BPM | DIASTOLIC BLOOD PRESSURE: 79 MMHG | WEIGHT: 170 LBS | RESPIRATION RATE: 16 BRPM | HEIGHT: 72 IN | OXYGEN SATURATION: 97 % | TEMPERATURE: 97.4 F | SYSTOLIC BLOOD PRESSURE: 129 MMHG

## 2023-05-28 DIAGNOSIS — K36 OTHER APPENDICITIS: Primary | ICD-10-CM

## 2023-05-28 DIAGNOSIS — R10.84 GENERALIZED ABDOMINAL PAIN: ICD-10-CM

## 2023-05-28 LAB
BACTERIA UR CULT: NORMAL
BASOPHILS ABSOLUTE: 0 THOU/MM3 (ref 0–0.1)
BASOPHILS NFR BLD AUTO: 0.4 %
CRP SERPL-MCNC: < 0.3 MG/DL (ref 0–1)
DEPRECATED RDW RBC AUTO: 43.7 FL (ref 35–45)
EOSINOPHIL NFR BLD AUTO: 1.4 %
EOSINOPHILS ABSOLUTE: 0.1 THOU/MM3 (ref 0–0.4)
ERYTHROCYTE [DISTWIDTH] IN BLOOD BY AUTOMATED COUNT: 12.8 % (ref 11.5–14.5)
HCT VFR BLD AUTO: 40.3 % (ref 42–52)
HGB BLD-MCNC: 12.9 GM/DL (ref 14–18)
IMM GRANULOCYTES # BLD AUTO: 0.01 THOU/MM3 (ref 0–0.07)
IMM GRANULOCYTES NFR BLD AUTO: 0.2 %
LYMPHOCYTES ABSOLUTE: 1.4 THOU/MM3 (ref 1–4.8)
LYMPHOCYTES NFR BLD AUTO: 27.1 %
MCH RBC QN AUTO: 30 PG (ref 26–33)
MCHC RBC AUTO-ENTMCNC: 32 GM/DL (ref 32.2–35.5)
MCV RBC AUTO: 93.7 FL (ref 80–94)
MONOCYTES ABSOLUTE: 0.2 THOU/MM3 (ref 0.4–1.3)
MONOCYTES NFR BLD AUTO: 4.7 %
NEUTROPHILS NFR BLD AUTO: 66.2 %
NRBC BLD AUTO-RTO: 0 /100 WBC
PLATELET # BLD AUTO: 94 THOU/MM3 (ref 130–400)
PMV BLD AUTO: 13.5 FL (ref 9.4–12.4)
RBC # BLD AUTO: 4.3 MILL/MM3 (ref 4.7–6.1)
SCAN OF BLOOD SMEAR: NORMAL
SEGMENTED NEUTROPHILS ABSOLUTE COUNT: 3.4 THOU/MM3 (ref 1.8–7.7)
WBC # BLD AUTO: 5.1 THOU/MM3 (ref 4.8–10.8)

## 2023-05-28 PROCEDURE — 6360000002 HC RX W HCPCS

## 2023-05-28 PROCEDURE — 85025 COMPLETE CBC W/AUTO DIFF WBC: CPT

## 2023-05-28 PROCEDURE — 86140 C-REACTIVE PROTEIN: CPT

## 2023-05-28 PROCEDURE — 99284 EMERGENCY DEPT VISIT MOD MDM: CPT

## 2023-05-28 PROCEDURE — 96372 THER/PROPH/DIAG INJ SC/IM: CPT

## 2023-05-28 PROCEDURE — 36415 COLL VENOUS BLD VENIPUNCTURE: CPT

## 2023-05-28 RX ORDER — LIDOCAINE HYDROCHLORIDE 10 MG/ML
INJECTION, SOLUTION EPIDURAL; INFILTRATION; INTRACAUDAL; PERINEURAL
Status: DISCONTINUED
Start: 2023-05-28 | End: 2023-05-28 | Stop reason: HOSPADM

## 2023-05-28 RX ORDER — CEFTRIAXONE 1 G/1
1000 INJECTION, POWDER, FOR SOLUTION INTRAMUSCULAR; INTRAVENOUS ONCE
Status: COMPLETED | OUTPATIENT
Start: 2023-05-28 | End: 2023-05-28

## 2023-05-28 RX ADMIN — CEFTRIAXONE SODIUM 1000 MG: 1 INJECTION, POWDER, FOR SOLUTION INTRAMUSCULAR; INTRAVENOUS at 14:25

## 2023-05-28 ASSESSMENT — PAIN SCALES - GENERAL: PAINLEVEL_OUTOF10: 0

## 2023-05-29 LAB — BACTERIA UR CULT: NORMAL

## 2023-05-29 NOTE — ED PROVIDER NOTES
[facility] [address] [address] EMERGENCY DEPT      EMERGENCY MEDICINE     Pt Name: Ta Joseph  MRN: [id number]  [place] 1990  Date of evaluation: 5/28/2023  Provider: Angie Connors MD, [facility]       Chief Complaint   Patient presents with    Abdominal Pain     Reassess for possible appendicitis. Was instructed last night by Dr. Misti Waterman to come back to ED for re-evaluation today. HISTORY OF PRESENT ILLNESS   Ta Joseph is a pleasant 35 y.o. male who presents to the emergency department for evaluation of appendicitis. Per the patient, he was seen in the ED yesterday for luis enrique mbilical abdominal pain. Seen by general surgeon Dr. Mario Ernst here in [facility] emergency department as well as emergency department physicians. Instructed to take antibiotics and return tomorrow for follow-up of his abdominal pain. There is no concern for appendiceal rupture or need for surgery yesterday. Patient states after receiving the Rocephin injection in the ED he feels significant improvement of his pain. He has not yet filled his prescription of antibiotics for Augmentin. Patient states his pain is pretty much gone at this point. Patient denies any nausea or vomiting, all other review of systems are negative, no chest pain, shortness of breath, urinary urgency frequency or dysuria. PASTMEDICAL HISTORY/Co-Morbid Conditions:   History reviewed. No pertinent past medical history. Patient Active Problem List   Diagnosis Code    Lower abdominal pain of unknown etiology R10.30    Nonspecific abnormal findings on diagnostic imaging R93.89     SURGICAL HISTORY     History reviewed. No pertinent surgical history.     CURRENT MEDICATIONS       Discharge Medication List as of 5/28/2023  2:41 PM        CONTINUE these medications which have NOT CHANGED    Details   amoxicillin-clavulanate (AUGMENTIN) 875-125 MG per tablet Take 1 tablet by mouth 2 times daily for 7 days, Disp-14 tablet, R-0Normal

## 2023-07-13 ENCOUNTER — OFFICE VISIT (OUTPATIENT)
Dept: INTERNAL MEDICINE CLINIC | Age: 33
End: 2023-07-13
Payer: COMMERCIAL

## 2023-07-13 VITALS
HEART RATE: 77 BPM | WEIGHT: 158 LBS | BODY MASS INDEX: 20.94 KG/M2 | SYSTOLIC BLOOD PRESSURE: 128 MMHG | HEIGHT: 73 IN | DIASTOLIC BLOOD PRESSURE: 65 MMHG

## 2023-07-13 DIAGNOSIS — F11.20 SEVERE OPIOID USE DISORDER (HCC): Primary | ICD-10-CM

## 2023-07-13 DIAGNOSIS — R93.89 ABNORMAL CT SCAN: ICD-10-CM

## 2023-07-13 DIAGNOSIS — K38.9 APPENDICOLITH: ICD-10-CM

## 2023-07-13 DIAGNOSIS — R63.4 WEIGHT LOSS: ICD-10-CM

## 2023-07-13 PROCEDURE — G8420 CALC BMI NORM PARAMETERS: HCPCS | Performed by: NURSE PRACTITIONER

## 2023-07-13 PROCEDURE — G8428 CUR MEDS NOT DOCUMENT: HCPCS | Performed by: NURSE PRACTITIONER

## 2023-07-13 PROCEDURE — 4004F PT TOBACCO SCREEN RCVD TLK: CPT | Performed by: NURSE PRACTITIONER

## 2023-07-13 PROCEDURE — 80305 DRUG TEST PRSMV DIR OPT OBS: CPT | Performed by: NURSE PRACTITIONER

## 2023-07-13 PROCEDURE — 99214 OFFICE O/P EST MOD 30 MIN: CPT | Performed by: NURSE PRACTITIONER

## 2023-07-13 RX ORDER — BUPRENORPHINE AND NALOXONE 12; 3 MG/1; MG/1
1 FILM, SOLUBLE BUCCAL; SUBLINGUAL DAILY
Qty: 28 FILM | Refills: 0 | Status: SHIPPED | OUTPATIENT
Start: 2023-07-13 | End: 2023-08-10

## 2023-08-04 ENCOUNTER — HOSPITAL ENCOUNTER (OUTPATIENT)
Dept: ULTRASOUND IMAGING | Age: 33
Discharge: HOME | End: 2023-08-04
Payer: COMMERCIAL

## 2023-08-04 DIAGNOSIS — R93.89 ABNORMAL CT SCAN: ICD-10-CM

## 2023-08-04 DIAGNOSIS — R63.4 WEIGHT LOSS: ICD-10-CM

## 2023-08-04 DIAGNOSIS — K38.9 APPENDICOLITH: ICD-10-CM

## 2023-08-04 PROCEDURE — 76705 ECHO EXAM OF ABDOMEN: CPT

## 2023-08-07 ENCOUNTER — TELEPHONE (OUTPATIENT)
Dept: INTERNAL MEDICINE CLINIC | Age: 33
End: 2023-08-07

## 2023-08-07 DIAGNOSIS — R93.89 ABNORMAL ULTRASOUND: Primary | ICD-10-CM

## 2023-08-07 NOTE — TELEPHONE ENCOUNTER
CT abd 5/27 showed Possible appendicolith in a nondilated appendix with adjacent free fluid. Findings are suspicious for acute appendicitis      US abd completed 8/4  Thickened dilated appearing appendix. This could represent acute appendicitis.

## 2023-08-08 ENCOUNTER — APPOINTMENT (OUTPATIENT)
Dept: CT IMAGING | Age: 33
End: 2023-08-08
Payer: COMMERCIAL

## 2023-08-08 ENCOUNTER — HOSPITAL ENCOUNTER (EMERGENCY)
Age: 33
Discharge: HOME | End: 2023-08-08
Attending: STUDENT IN AN ORGANIZED HEALTH CARE EDUCATION/TRAINING PROGRAM
Payer: COMMERCIAL

## 2023-08-08 VITALS
WEIGHT: 160 LBS | OXYGEN SATURATION: 100 % | BODY MASS INDEX: 21.67 KG/M2 | RESPIRATION RATE: 20 BRPM | HEART RATE: 66 BPM | DIASTOLIC BLOOD PRESSURE: 68 MMHG | TEMPERATURE: 98.1 F | SYSTOLIC BLOOD PRESSURE: 123 MMHG | HEIGHT: 72 IN

## 2023-08-08 DIAGNOSIS — R10.9 ABDOMINAL PAIN, UNSPECIFIED ABDOMINAL LOCATION: Primary | ICD-10-CM

## 2023-08-08 DIAGNOSIS — K38.9 APPENDICOLITH: Primary | ICD-10-CM

## 2023-08-08 LAB
ALBUMIN SERPL BCG-MCNC: 4.2 G/DL (ref 3.5–5.1)
ALP SERPL-CCNC: 62 U/L (ref 38–126)
ALT SERPL W/O P-5'-P-CCNC: 15 U/L (ref 11–66)
ANION GAP SERPL CALC-SCNC: 8 MEQ/L (ref 8–16)
AST SERPL-CCNC: 21 U/L (ref 5–40)
BASOPHILS ABSOLUTE: 0 THOU/MM3 (ref 0–0.1)
BASOPHILS NFR BLD AUTO: 0.4 %
BILIRUB SERPL-MCNC: 0.3 MG/DL (ref 0.3–1.2)
BILIRUB UR QL STRIP.AUTO: NEGATIVE
BUN SERPL-MCNC: 14 MG/DL (ref 7–22)
CALCIUM SERPL-MCNC: 9.1 MG/DL (ref 8.5–10.5)
CHARACTER UR: CLEAR
CHLORIDE SERPL-SCNC: 102 MEQ/L (ref 98–111)
CO2 SERPL-SCNC: 27 MEQ/L (ref 23–33)
COLOR: YELLOW
CREAT SERPL-MCNC: 0.8 MG/DL (ref 0.4–1.2)
DEPRECATED RDW RBC AUTO: 41.6 FL (ref 35–45)
EOSINOPHIL NFR BLD AUTO: 2.8 %
EOSINOPHILS ABSOLUTE: 0.2 THOU/MM3 (ref 0–0.4)
ERYTHROCYTE [DISTWIDTH] IN BLOOD BY AUTOMATED COUNT: 12.1 % (ref 11.5–14.5)
GFR SERPL CREATININE-BSD FRML MDRD: > 60 ML/MIN/1.73M2
GLUCOSE SERPL-MCNC: 110 MG/DL (ref 70–108)
GLUCOSE UR QL STRIP.AUTO: NEGATIVE MG/DL
HCT VFR BLD AUTO: 40.1 % (ref 42–52)
HGB BLD-MCNC: 13.2 GM/DL (ref 14–18)
HGB UR QL STRIP.AUTO: NEGATIVE
IMM GRANULOCYTES # BLD AUTO: 0.02 THOU/MM3 (ref 0–0.07)
IMM GRANULOCYTES NFR BLD AUTO: 0.3 %
KETONES UR QL STRIP.AUTO: NEGATIVE
LIPASE SERPL-CCNC: 13.3 U/L (ref 5.6–51.3)
LYMPHOCYTES ABSOLUTE: 1.5 THOU/MM3 (ref 1–4.8)
LYMPHOCYTES NFR BLD AUTO: 21.9 %
MCH RBC QN AUTO: 30.6 PG (ref 26–33)
MCHC RBC AUTO-ENTMCNC: 32.9 GM/DL (ref 32.2–35.5)
MCV RBC AUTO: 92.8 FL (ref 80–94)
MONOCYTES ABSOLUTE: 0.4 THOU/MM3 (ref 0.4–1.3)
MONOCYTES NFR BLD AUTO: 5.8 %
NEUTROPHILS NFR BLD AUTO: 68.8 %
NITRITE UR QL STRIP: NEGATIVE
NRBC BLD AUTO-RTO: 0 /100 WBC
OSMOLALITY SERPL CALC.SUM OF ELEC: 274.9 MOSMOL/KG (ref 275–300)
PH UR STRIP.AUTO: 5.5 [PH] (ref 5–9)
PLATELET # BLD AUTO: 92 THOU/MM3 (ref 130–400)
PLATELET BLD QL SMEAR: ABNORMAL
PMV BLD AUTO: 14 FL (ref 9.4–12.4)
POTASSIUM SERPL-SCNC: 4.2 MEQ/L (ref 3.5–5.2)
PROT SERPL-MCNC: 7.4 G/DL (ref 6.1–8)
PROT UR STRIP.AUTO-MCNC: NEGATIVE MG/DL
RBC # BLD AUTO: 4.32 MILL/MM3 (ref 4.7–6.1)
SCAN OF BLOOD SMEAR: NORMAL
SEGMENTED NEUTROPHILS ABSOLUTE COUNT: 4.7 THOU/MM3 (ref 1.8–7.7)
SODIUM SERPL-SCNC: 137 MEQ/L (ref 135–145)
SP GR UR REFRACT.AUTO: 1.03 (ref 1–1.03)
UROBILINOGEN, URINE: 1 EU/DL (ref 0–1)
WBC # BLD AUTO: 6.8 THOU/MM3 (ref 4.8–10.8)
WBC #/AREA URNS HPF: NEGATIVE /[HPF]

## 2023-08-08 PROCEDURE — 74177 CT ABD & PELVIS W/CONTRAST: CPT

## 2023-08-08 PROCEDURE — 85025 COMPLETE CBC W/AUTO DIFF WBC: CPT

## 2023-08-08 PROCEDURE — 36415 COLL VENOUS BLD VENIPUNCTURE: CPT

## 2023-08-08 PROCEDURE — 6360000004 HC RX CONTRAST MEDICATION: Performed by: EMERGENCY MEDICINE

## 2023-08-08 PROCEDURE — 80053 COMPREHEN METABOLIC PANEL: CPT

## 2023-08-08 PROCEDURE — 99285 EMERGENCY DEPT VISIT HI MDM: CPT

## 2023-08-08 PROCEDURE — 81003 URINALYSIS AUTO W/O SCOPE: CPT

## 2023-08-08 PROCEDURE — 83690 ASSAY OF LIPASE: CPT

## 2023-08-08 RX ORDER — POLYETHYLENE GLYCOL 3350 17 G/17G
17 POWDER, FOR SOLUTION ORAL DAILY
Qty: 510 G | Refills: 0 | Status: SHIPPED | OUTPATIENT
Start: 2023-08-08 | End: 2023-08-23

## 2023-08-08 RX ADMIN — IOPAMIDOL 80 ML: 755 INJECTION, SOLUTION INTRAVENOUS at 13:44

## 2023-08-08 ASSESSMENT — PAIN - FUNCTIONAL ASSESSMENT: PAIN_FUNCTIONAL_ASSESSMENT: 0-10

## 2023-08-08 ASSESSMENT — PAIN DESCRIPTION - LOCATION: LOCATION: ABDOMEN

## 2023-08-08 ASSESSMENT — PAIN SCALES - GENERAL: PAINLEVEL_OUTOF10: 1

## 2023-08-08 NOTE — DISCHARGE INSTRUCTIONS
You were seen here today for possible appendicitis. Follow-up with the surgery clinic as well as the gastroenterology clinic within next 2 days for further evaluation. Take Miralax as prescribed and make sure to drink plenty of water. Return here if your symptoms worsen.

## 2023-08-08 NOTE — ED TRIAGE NOTES
Pt comes to ED with c/o abdominal pain. Pt states he had an outpatient US done on Friday for appendicits. Pt states he got a call today from his doctor to come in because US showed appendicitis. Pt states he had really bad pain about a month ago. Pt states he also took an ATB for this. Pt states currently his pain is minimal and comes and goes.

## 2023-08-08 NOTE — TELEPHONE ENCOUNTER
Patient called stated that he went to ER for abdominal pain and US results and they sent him home stated that nothing was wrong. This nurse spoke with CNP and we will contact General Surgery in the Morning to see if we can get patient into the office for an early appointment to be seen for results of US of possible acute appendicitis. Told patient if pain continued to go back to ER and sorry for the incontinence. Patient was understanding and verbalized what to do if the pain persisted.

## 2023-08-08 NOTE — TELEPHONE ENCOUNTER
This nurse got a hold of patient. Patient stated he is still having abdominal discomfort at this time. This nurse recommended to go to ER for follow up. Patient is going to leave work for ER work up with findings on imaging and CNP recommendations.

## 2023-08-08 NOTE — TELEPHONE ENCOUNTER
Tried to call patient to let him know results and recommendations. No answer and couldn't leave voicemail. Will continue to try to get a hold of patient.

## 2023-08-09 NOTE — TELEPHONE ENCOUNTER
Patient has an appointment with Dr Jorden Pugh on 8/23/23 at 1pm for General Surgery. Patient aware.

## 2023-08-10 ENCOUNTER — OFFICE VISIT (OUTPATIENT)
Dept: INTERNAL MEDICINE CLINIC | Age: 33
End: 2023-08-10
Payer: COMMERCIAL

## 2023-08-10 VITALS
HEIGHT: 73 IN | BODY MASS INDEX: 21.34 KG/M2 | HEART RATE: 67 BPM | SYSTOLIC BLOOD PRESSURE: 128 MMHG | DIASTOLIC BLOOD PRESSURE: 67 MMHG | WEIGHT: 161 LBS

## 2023-08-10 DIAGNOSIS — F11.20 SEVERE OPIOID USE DISORDER (HCC): Primary | ICD-10-CM

## 2023-08-10 PROCEDURE — 4004F PT TOBACCO SCREEN RCVD TLK: CPT | Performed by: NURSE PRACTITIONER

## 2023-08-10 PROCEDURE — G8420 CALC BMI NORM PARAMETERS: HCPCS | Performed by: NURSE PRACTITIONER

## 2023-08-10 PROCEDURE — 99214 OFFICE O/P EST MOD 30 MIN: CPT | Performed by: NURSE PRACTITIONER

## 2023-08-10 PROCEDURE — 80305 DRUG TEST PRSMV DIR OPT OBS: CPT | Performed by: NURSE PRACTITIONER

## 2023-08-10 PROCEDURE — G8428 CUR MEDS NOT DOCUMENT: HCPCS | Performed by: NURSE PRACTITIONER

## 2023-08-10 RX ORDER — BUPRENORPHINE AND NALOXONE 12; 3 MG/1; MG/1
1 FILM, SOLUBLE BUCCAL; SUBLINGUAL DAILY
Qty: 28 FILM | Refills: 0 | Status: SHIPPED | OUTPATIENT
Start: 2023-08-10 | End: 2023-09-07

## 2023-08-10 NOTE — PROGRESS NOTES
08/10/23   The patients primary care physician is DOMINIC Sow CNP    Naya Adjutant is a 35 y.o.  male who presents in office today for follow up medication assisted treatment, substance use disorder. He was a routine MAT pt of Dr Ankur Dutton and transferred care to [facility]. Pt required late evening appts due to employment so began seeing me last month 22    Recently referred to Surgery for abnl CT and US, ? Appendicitis. Appt scheduled for   He denies any abd pain, fever, or sx at this time. Previously evaluated in the ED  CBC on  normal WBC    Pt denies any urges, triggers, or cravings. UDS acceptable      Pertinent Drug History  Patient has had problems using heroin/fentanyl, pain pills  Patient started using [id number] years ago  He said his brother had  of a heroin overdose  Patient uses it snorting  Other drugs used: THC  Suboxone programs in the past no  Vivitrol in the past no  Patient would typically use $40-$50 worth of heroin/fentanyl daily  No past medical history on file.       Social History     Socioeconomic History    Marital status: Single     Spouse name: Not on file    Number of children: Not on file    Years of education: Not on file    Highest education level: Not on file   Occupational History    Not on file   Tobacco Use    Smoking status: Every Day     Packs/day: 1.00     Types: Cigarettes    Smokeless tobacco: Never   Substance and Sexual Activity    Alcohol use: Not Currently     Comment: social/occasional    Drug use: No     Comment: last used fentanyl 20    Sexual activity: Not Currently   Other Topics Concern    Not on file   Social History Narrative    Not on file     Social Determinants of Health     Financial Resource Strain: Not on file   Food Insecurity: Not on file   Transportation Needs: Not on file   Physical Activity: Not on file   Stress: Not on file   Social Connections: Not on file   Intimate Partner Violence: Not on file   Housing

## 2023-08-10 NOTE — PROGRESS NOTES
Verbal order per Leon Frost CNP for urine drug screen. Positive for BUP, THC. Verified results with Alphonso Porter LPN.

## 2023-08-23 ENCOUNTER — OFFICE VISIT (OUTPATIENT)
Dept: SURGERY | Age: 33
End: 2023-08-23
Payer: COMMERCIAL

## 2023-08-23 VITALS
SYSTOLIC BLOOD PRESSURE: 116 MMHG | HEART RATE: 66 BPM | HEIGHT: 73 IN | DIASTOLIC BLOOD PRESSURE: 60 MMHG | BODY MASS INDEX: 20.87 KG/M2 | OXYGEN SATURATION: 98 % | TEMPERATURE: 98.3 F | WEIGHT: 157.5 LBS

## 2023-08-23 DIAGNOSIS — R10.31 RLQ ABDOMINAL PAIN: Primary | ICD-10-CM

## 2023-08-23 DIAGNOSIS — K38.9 APPENDICOLITH: ICD-10-CM

## 2023-08-23 DIAGNOSIS — K36 CHRONIC APPENDICITIS: ICD-10-CM

## 2023-08-23 PROCEDURE — G8427 DOCREV CUR MEDS BY ELIG CLIN: HCPCS | Performed by: SURGERY

## 2023-08-23 PROCEDURE — 4004F PT TOBACCO SCREEN RCVD TLK: CPT | Performed by: SURGERY

## 2023-08-23 PROCEDURE — G8420 CALC BMI NORM PARAMETERS: HCPCS | Performed by: SURGERY

## 2023-08-23 PROCEDURE — 99203 OFFICE O/P NEW LOW 30 MIN: CPT | Performed by: SURGERY

## 2023-08-23 RX ORDER — IBUPROFEN 200 MG
200 TABLET ORAL EVERY 6 HOURS PRN
COMMUNITY

## 2023-08-25 ASSESSMENT — ENCOUNTER SYMPTOMS
NAUSEA: 0
DIARRHEA: 0
EYE ITCHING: 0
TROUBLE SWALLOWING: 0
RHINORRHEA: 0
APNEA: 0
VOMITING: 0
FACIAL SWELLING: 0
BLOOD IN STOOL: 0
RECTAL PAIN: 0
COUGH: 0
CHOKING: 0
ANAL BLEEDING: 0
ABDOMINAL DISTENTION: 0
WHEEZING: 0
BACK PAIN: 1
EYE PAIN: 0
CONSTIPATION: 0
PHOTOPHOBIA: 0
STRIDOR: 0
SINUS PRESSURE: 0
SHORTNESS OF BREATH: 0
VOICE CHANGE: 0
ABDOMINAL PAIN: 1
ALLERGIC/IMMUNOLOGIC NEGATIVE: 1
SORE THROAT: 0
EYE REDNESS: 0
EYE DISCHARGE: 0
COLOR CHANGE: 0
CHEST TIGHTNESS: 0

## 2023-08-25 NOTE — H&P
Jean Claude Maldonado (:  1990)      ASSESSMENT:  1. Right lower quadrant abdominal pain  2. Chronic appendicitis  3. Appendicolith     PLAN:  1. Schedule Jomar for robotic possible open appendectomy. 2. He will undergo pre-operative clearance per anesthesia guidelines with risk factors listed under the past medical history diagnosis & problem list.  3. The risks, benefits and alternatives were discussed with Emeka De Guzman including non-operative management. The pros and cons of robotic, laparoscopic and open techniques were discussed. All questions answered. He understands and wishes to proceed with surgical intervention. 4. Restrictions discussed with Emeka De Guzman and he expresses understanding. 5. He is advised to call back directly if there are further questions/concerns, or if his symptoms worsen prior to surgery. 6.  Dietary modification/restrictions discussed. SUBJECTIVE/OBJECTIVE:          Chief Complaint   Patient presents with    Surgical Consult       NP seen in ER  & 23 - Abnormal CT scan and US appendix      HPI  Emeka De Guzman is a 29-year-old male who presents for evaluation secondary to right-sided abdominal pain associated with appendicolith. He states since May he has been experiencing significant discomfort on the right side. Associated with fatigue and decreased appetite. Underwent CT imaging May 2023 demonstrating appendicolith and some concern for abnormal appendix. No surgical intervention was performed at that time. Underwent upper endoscopy as well as gastric emptying study which per patient states they were within normal limits. At times he states the pain would become very severe. It would last up to a day. Associated at that time with nausea and vomiting. Continued over the next few months. Obtained ultrasound beginning of this month that demonstrated a thickened dilated appendix. Follow-up CT imaging did not demonstrate any acute disease but only appendicolith.   Has been on

## 2023-08-25 NOTE — PROGRESS NOTES
hours                                  21                                                10     IMPRESSION:     Mildly delayed gastric emptying at 3 and 4 hours of uncertain clinical significance. **This report has been created using voice recognition software. It may contain minor errors which are inherent in voice recognition technology. **        Final report electronically signed by Dr. Marisabel Jessica on 11/22/2022 1:32 PM   US APPENDIX  Order: [id number]  Status: Final result     Visible to patient: No (not released)     Next appt: 09/07/2023 at 05:15 PM in Internal Medicine DOMINIC Harris - CNP)     Dx: Weight loss; Abnormal CT scan; Append. ..     0 Result Notes  Details    Reading Physician Reading Date Result Priority   Thao Morales MD 8/4/2023      Narrative & Impression  PROCEDURE: US APPENDIX     CLINICAL INFORMATION: Weight loss, Abnormal CT scan, Appendicolith . COMPARISON: Correlation with CT 5/27/2023     TECHNIQUE: Limited grayscale and color flow sonographic images of the right lower quadrant were obtained. FINDINGS: There is a blind-ending tubular structure in the right lower quadrant measuring 5.4 x 1.6 x 1.2 cm. This has a somewhat thickened wall. There are no fluid collections adjacent to this region. No significant lymphadenopathy. IMPRESSION:  Thickened dilated appearing appendix. This could represent acute appendicitis. **This report has been created using voice recognition software. It may contain minor errors which are inherent in voice recognition technology. **     Final report electronically signed by Dr Supriya Arzola on 8/4/2023 1:16 PM           Exam Ended: 08/04/23 10:10 EDT Last Resulted: 08/04/23 13:16 EDT         CT ABDOMEN PELVIS W IV CONTRAST Additional Contrast? None  Order: [id number]  Status: Final result     Visible to patient: No (not released)     Next appt: 09/07/2023 at 05:15 PM in Internal Medicine DOMINIC Harris -

## 2023-08-29 ENCOUNTER — HOSPITAL ENCOUNTER (OUTPATIENT)
Age: 33
Setting detail: OUTPATIENT SURGERY
Discharge: HOME | End: 2023-08-29
Attending: SURGERY | Admitting: SURGERY
Payer: MEDICAID

## 2023-08-29 ENCOUNTER — ANESTHESIA (OUTPATIENT)
Dept: OPERATING ROOM | Age: 33
End: 2023-08-29
Payer: MEDICAID

## 2023-08-29 ENCOUNTER — ANESTHESIA EVENT (OUTPATIENT)
Dept: OPERATING ROOM | Age: 33
End: 2023-08-29
Payer: MEDICAID

## 2023-08-29 VITALS
SYSTOLIC BLOOD PRESSURE: 112 MMHG | BODY MASS INDEX: 21.24 KG/M2 | HEIGHT: 72 IN | DIASTOLIC BLOOD PRESSURE: 67 MMHG | WEIGHT: 156.8 LBS | HEART RATE: 57 BPM | TEMPERATURE: 98.4 F | OXYGEN SATURATION: 99 % | RESPIRATION RATE: 16 BRPM

## 2023-08-29 DIAGNOSIS — K38.9 APPENDICOLITH: ICD-10-CM

## 2023-08-29 LAB
AMPHETAMINES UR QL SCN: NEGATIVE
BARBITURATES UR QL SCN: NEGATIVE
BENZODIAZ UR QL SCN: NEGATIVE
BZE UR QL SCN: NEGATIVE
CANNABINOIDS UR QL SCN: NORMAL
FENTANYL: NEGATIVE
OPIATES UR QL SCN: NEGATIVE
OXYCODONE: NEGATIVE
PHENCYCLIDINE QUANTITATIVE URINE: NEGATIVE

## 2023-08-29 PROCEDURE — 7100000011 HC PHASE II RECOVERY - ADDTL 15 MIN: Performed by: SURGERY

## 2023-08-29 PROCEDURE — 44970 LAPAROSCOPY APPENDECTOMY: CPT | Performed by: SURGERY

## 2023-08-29 PROCEDURE — 6360000002 HC RX W HCPCS

## 2023-08-29 PROCEDURE — 7100000010 HC PHASE II RECOVERY - FIRST 15 MIN: Performed by: SURGERY

## 2023-08-29 PROCEDURE — 6360000002 HC RX W HCPCS: Performed by: ANESTHESIOLOGY

## 2023-08-29 PROCEDURE — 3700000001 HC ADD 15 MINUTES (ANESTHESIA): Performed by: SURGERY

## 2023-08-29 PROCEDURE — 80305 DRUG TEST PRSMV DIR OPT OBS: CPT

## 2023-08-29 PROCEDURE — 3700000000 HC ANESTHESIA ATTENDED CARE: Performed by: SURGERY

## 2023-08-29 PROCEDURE — 88304 TISSUE EXAM BY PATHOLOGIST: CPT

## 2023-08-29 PROCEDURE — 3600000019 HC SURGERY ROBOT ADDTL 15MIN: Performed by: SURGERY

## 2023-08-29 PROCEDURE — 7100000001 HC PACU RECOVERY - ADDTL 15 MIN: Performed by: SURGERY

## 2023-08-29 PROCEDURE — 3600000009 HC SURGERY ROBOT BASE: Performed by: SURGERY

## 2023-08-29 PROCEDURE — 2580000003 HC RX 258: Performed by: ANESTHESIOLOGY

## 2023-08-29 PROCEDURE — 2500000003 HC RX 250 WO HCPCS: Performed by: ANESTHESIOLOGY

## 2023-08-29 PROCEDURE — S2900 ROBOTIC SURGICAL SYSTEM: HCPCS | Performed by: SURGERY

## 2023-08-29 PROCEDURE — 6360000002 HC RX W HCPCS: Performed by: SURGERY

## 2023-08-29 PROCEDURE — C9399 UNCLASSIFIED DRUGS OR BIOLOG: HCPCS | Performed by: ANESTHESIOLOGY

## 2023-08-29 PROCEDURE — 2720000010 HC SURG SUPPLY STERILE: Performed by: SURGERY

## 2023-08-29 PROCEDURE — G0480 DRUG TEST DEF 1-7 CLASSES: HCPCS

## 2023-08-29 PROCEDURE — 2709999900 HC NON-CHARGEABLE SUPPLY: Performed by: SURGERY

## 2023-08-29 PROCEDURE — 7100000000 HC PACU RECOVERY - FIRST 15 MIN: Performed by: SURGERY

## 2023-08-29 RX ORDER — MEPERIDINE HYDROCHLORIDE 25 MG/ML
12.5 INJECTION INTRAMUSCULAR; INTRAVENOUS; SUBCUTANEOUS EVERY 5 MIN PRN
Status: ACTIVE | OUTPATIENT
Start: 2023-08-29 | End: 2023-08-29

## 2023-08-29 RX ORDER — ONDANSETRON 4 MG/1
4 TABLET, ORALLY DISINTEGRATING ORAL EVERY 8 HOURS PRN
Status: CANCELLED | OUTPATIENT
Start: 2023-08-29

## 2023-08-29 RX ORDER — SODIUM CHLORIDE 0.9 % (FLUSH) 0.9 %
5-40 SYRINGE (ML) INJECTION EVERY 12 HOURS SCHEDULED
Status: CANCELLED | OUTPATIENT
Start: 2023-08-29

## 2023-08-29 RX ORDER — MORPHINE SULFATE 2 MG/ML
2 INJECTION, SOLUTION INTRAMUSCULAR; INTRAVENOUS
Status: CANCELLED | OUTPATIENT
Start: 2023-08-29

## 2023-08-29 RX ORDER — PROPOFOL 10 MG/ML
INJECTION, EMULSION INTRAVENOUS PRN
Status: DISCONTINUED | OUTPATIENT
Start: 2023-08-29 | End: 2023-08-29 | Stop reason: SDUPTHER

## 2023-08-29 RX ORDER — TRAMADOL HYDROCHLORIDE 50 MG/1
100 TABLET ORAL EVERY 6 HOURS PRN
Status: CANCELLED | OUTPATIENT
Start: 2023-08-29

## 2023-08-29 RX ORDER — DROPERIDOL 2.5 MG/ML
INJECTION, SOLUTION INTRAMUSCULAR; INTRAVENOUS
Status: COMPLETED
Start: 2023-08-29 | End: 2023-08-29

## 2023-08-29 RX ORDER — MIDAZOLAM HYDROCHLORIDE 1 MG/ML
INJECTION INTRAMUSCULAR; INTRAVENOUS PRN
Status: DISCONTINUED | OUTPATIENT
Start: 2023-08-29 | End: 2023-08-29 | Stop reason: SDUPTHER

## 2023-08-29 RX ORDER — ONDANSETRON 2 MG/ML
4 INJECTION INTRAMUSCULAR; INTRAVENOUS EVERY 6 HOURS PRN
Status: CANCELLED | OUTPATIENT
Start: 2023-08-29

## 2023-08-29 RX ORDER — KETOROLAC TROMETHAMINE 30 MG/ML
INJECTION, SOLUTION INTRAMUSCULAR; INTRAVENOUS PRN
Status: DISCONTINUED | OUTPATIENT
Start: 2023-08-29 | End: 2023-08-29 | Stop reason: SDUPTHER

## 2023-08-29 RX ORDER — ACETAMINOPHEN 325 MG/1
650 TABLET ORAL EVERY 4 HOURS PRN
Status: CANCELLED | OUTPATIENT
Start: 2023-08-29

## 2023-08-29 RX ORDER — TRAMADOL HYDROCHLORIDE 50 MG/1
50 TABLET ORAL EVERY 6 HOURS PRN
Qty: 15 TABLET | Refills: 0 | Status: SHIPPED | OUTPATIENT
Start: 2023-08-29 | End: 2023-09-13

## 2023-08-29 RX ORDER — DROPERIDOL 2.5 MG/ML
0.62 INJECTION, SOLUTION INTRAMUSCULAR; INTRAVENOUS ONCE
Status: COMPLETED | OUTPATIENT
Start: 2023-08-29 | End: 2023-08-29

## 2023-08-29 RX ORDER — KETOROLAC TROMETHAMINE 10 MG/1
10 TABLET, FILM COATED ORAL EVERY 8 HOURS PRN
Qty: 15 TABLET | Refills: 0 | Status: SHIPPED | OUTPATIENT
Start: 2023-08-29

## 2023-08-29 RX ORDER — SODIUM CHLORIDE 9 MG/ML
INJECTION, SOLUTION INTRAVENOUS PRN
Status: CANCELLED | OUTPATIENT
Start: 2023-08-29

## 2023-08-29 RX ORDER — SODIUM CHLORIDE 0.9 % (FLUSH) 0.9 %
5-40 SYRINGE (ML) INJECTION PRN
Status: CANCELLED | OUTPATIENT
Start: 2023-08-29

## 2023-08-29 RX ORDER — MEPERIDINE HYDROCHLORIDE 25 MG/ML
INJECTION INTRAMUSCULAR; INTRAVENOUS; SUBCUTANEOUS
Status: COMPLETED
Start: 2023-08-29 | End: 2023-08-29

## 2023-08-29 RX ORDER — BUPIVACAINE HYDROCHLORIDE 5 MG/ML
INJECTION, SOLUTION EPIDURAL; INTRACAUDAL PRN
Status: DISCONTINUED | OUTPATIENT
Start: 2023-08-29 | End: 2023-08-29 | Stop reason: ALTCHOICE

## 2023-08-29 RX ORDER — SODIUM CHLORIDE 9 MG/ML
INJECTION, SOLUTION INTRAVENOUS CONTINUOUS PRN
Status: DISCONTINUED | OUTPATIENT
Start: 2023-08-29 | End: 2023-08-29 | Stop reason: SDUPTHER

## 2023-08-29 RX ORDER — MORPHINE SULFATE 4 MG/ML
4 INJECTION, SOLUTION INTRAMUSCULAR; INTRAVENOUS
Status: CANCELLED | OUTPATIENT
Start: 2023-08-29

## 2023-08-29 RX ORDER — FAMOTIDINE 20 MG/1
20 TABLET, FILM COATED ORAL 2 TIMES DAILY
Status: CANCELLED | OUTPATIENT
Start: 2023-08-29

## 2023-08-29 RX ORDER — ROCURONIUM BROMIDE 10 MG/ML
INJECTION, SOLUTION INTRAVENOUS PRN
Status: DISCONTINUED | OUTPATIENT
Start: 2023-08-29 | End: 2023-08-29 | Stop reason: SDUPTHER

## 2023-08-29 RX ORDER — TRAMADOL HYDROCHLORIDE 50 MG/1
50 TABLET ORAL EVERY 6 HOURS PRN
Status: CANCELLED | OUTPATIENT
Start: 2023-08-29

## 2023-08-29 RX ADMIN — SUGAMMADEX 200 MG: 100 INJECTION, SOLUTION INTRAVENOUS at 12:00

## 2023-08-29 RX ADMIN — KETOROLAC TROMETHAMINE 30 MG: 30 INJECTION, SOLUTION INTRAMUSCULAR at 11:54

## 2023-08-29 RX ADMIN — DROPERIDOL 0.62 MG: 2.5 INJECTION, SOLUTION INTRAMUSCULAR; INTRAVENOUS at 12:14

## 2023-08-29 RX ADMIN — ROCURONIUM BROMIDE 50 MG: 10 INJECTION INTRAVENOUS at 11:22

## 2023-08-29 RX ADMIN — KETOROLAC TROMETHAMINE 30 MG: 30 INJECTION, SOLUTION INTRAMUSCULAR at 11:53

## 2023-08-29 RX ADMIN — MEPERIDINE HYDROCHLORIDE 12.5 MG: 25 INJECTION, SOLUTION INTRAMUSCULAR; INTRAVENOUS; SUBCUTANEOUS at 12:21

## 2023-08-29 RX ADMIN — MEPERIDINE HYDROCHLORIDE 12.5 MG: 25 INJECTION, SOLUTION INTRAMUSCULAR; INTRAVENOUS; SUBCUTANEOUS at 12:16

## 2023-08-29 RX ADMIN — MIDAZOLAM 2 MG: 1 INJECTION INTRAMUSCULAR; INTRAVENOUS at 11:16

## 2023-08-29 RX ADMIN — PROPOFOL 150 MG: 10 INJECTION, EMULSION INTRAVENOUS at 11:22

## 2023-08-29 RX ADMIN — SODIUM CHLORIDE: 9 INJECTION, SOLUTION INTRAVENOUS at 11:15

## 2023-08-29 ASSESSMENT — PAIN SCALES - GENERAL
PAINLEVEL_OUTOF10: 6
PAINLEVEL_OUTOF10: 0
PAINLEVEL_OUTOF10: 4
PAINLEVEL_OUTOF10: 6

## 2023-08-29 ASSESSMENT — PAIN DESCRIPTION - LOCATION
LOCATION: ABDOMEN
LOCATION: ABDOMEN

## 2023-08-29 ASSESSMENT — PAIN DESCRIPTION - DESCRIPTORS
DESCRIPTORS: ACHING
DESCRIPTORS: SORE

## 2023-08-29 NOTE — PROGRESS NOTES
Back to [facility] alert. Wife at bedside. crackers and ice water given. Side rails up bed in low position.  Call light in reach

## 2023-08-29 NOTE — BRIEF OP NOTE
Brief Postoperative Note      Patient: Jonna Choi  YOB: 1990  MRN: [id number]    Date of Procedure: 8/29/2023    Preoperative diagnosis:  1. Right lower quadrant abdominal pain  2. Chronic appendicitis  3.   Appendicolith    Post-Op Diagnosis: Same       Procedure(s):  Robot Lap Appendectomy    Surgeon(s):  Luis Enrique Godfrey MD    Assistant:  Surgical Assistant: Nestor Baltazar    Anesthesia: General/local    Estimated Blood Loss (mL): 5ml    Complications: None    Specimens:   ID Type Source Tests Collected by Time Destination   A : Appendix Tissue Appendix SURGICAL PATHOLOGY Luis Enrique Godfrey MD 8/29/2023 1038        Implants:  * No implants in log *      Drains: * No LDAs found *    Findings: as above - see op note for details      Electronically signed by Luis Enrique Godfrey MD on 8/29/2023 at 11:54 AM

## 2023-08-29 NOTE — ANESTHESIA POSTPROCEDURE EVALUATION
Department of Anesthesiology  Postprocedure Note    Patient: Hayden Fishman  MRN: [id number]  YOB: 1990  Date of evaluation: 8/29/2023      Procedure Summary     Date: 08/29/23 Room / Location: [address] [place] [address] / [address] [place] [address]    Anesthesia Start: 1115 Anesthesia Stop: 1215    Procedure: Robot Lap Appendectomy Diagnosis:       Appendicolith      (Appendicolith [K38.9])    Surgeons: Nelson Madden MD Responsible Provider: Mumtaz Fish MD    Anesthesia Type: general ASA Status: 2          Anesthesia Type: No value filed.     Demetrius Phase I: Demetrius Score: 9    Demetrius Phase II: Demetrius Score: 10      Anesthesia Post Evaluation    Patient location during evaluation: PACU  Patient participation: complete - patient participated  Level of consciousness: awake  Airway patency: patent  Nausea & Vomiting: no nausea  Complications: no  Cardiovascular status: hemodynamically stable  Respiratory status: acceptable  Hydration status: stable  Pain management: adequate

## 2023-08-29 NOTE — INTERVAL H&P NOTE
Update History & Physical    The patient's History and Physical was reviewed with the patient and I examined the patient. There was no change. The surgical site was confirmed by the patient and me. Plan: The risks, benefits, expected outcome, and alternative to the recommended procedure have been discussed with the patient. Patient understands and wants to proceed with the procedure.      Electronically signed by Joi Pena MD on 8/29/2023 at 10:02 AM

## 2023-08-29 NOTE — PROGRESS NOTES
Patient admitted to [facility] room 14 with girlfriend at bedside. Bed in low position side rails up call light in reach. Patient denies questions at this time.

## 2023-08-29 NOTE — DISCHARGE INSTRUCTIONS
apply. We do  know that surgical pain is real and will not hesitate to help eliminate some of your discomfort. However we will not be able to completely make you pain free and it is important to determine what pain level is tolerable for you **    WOUND/DRESSING INSTRUCTIONS:  Always ensure you and your care giver clean hands before and after caring for the wound. [x] Keep dressing clean and dry for 24 hours. Change when soiled or wet. [x] Allow steri-strips to fall off on their own. [x] Ice operative site as needed for 20 minutes 4 times a day. [x] May wash over incision in shower in 24 hours, but do not soak in a bath.  [] Take sitz bath for 20 minutes twice daily and after bowel movements. [] Keep the abdominal binder in place during the day. May remove to shower and at night.  [] Remove packing from wound in 24 hours and replace daily. [] Empty JAYNE drain daily and record the amounts. ABDOMINAL/LAPAROSCOPIC SURGERY  [x]You are encouraged to get up and move around as this helps with the circulation and speeds up the healing process. [x]Breath deeply and cough from time to time. This helps to clear your lungs and helps prevent pneumonia. [x]Supporting your incision with a pillow or your hand helps to minimize discomfort and pain. [x]Robotic/Laparoscopic patients may develop shoulder pain in the first 48 hours from the gas used during the procedure. FOLLOW-UP CARE. SPECIFICALLY WATCH FOR:   Fever over 101 degrees by mouth   Increased redness, warmth, hardness at operative site. Blood soaked dressing (small amounts of oozing may be normal.)   Increased or progressive drainage from the surgical area   Inability to urinate or blood in the urine   Pain not relieved by the medications ordered   Persistent nausea and/or vomiting, unable to retain fluids. Pain or swelling in your legs. Shortness of breath.     FOLLOW-UP APPOINTMENT   []1 week   [x]2 weeks   []Other    Call my office if you have any problem that concerns you [phone]. After hours, you can reach the answering service via the office phone number. IF YOU NEED IMMEDIATE ATTENTION, GO TO THE EMERGENCY ROOM AND YOUR DOCTOR WILL BE CONTACTED. Christina Hodges MD [address]  [place], [address]  Electronically signed 8/29/2023 at 10:02 AM

## 2023-08-29 NOTE — PROGRESS NOTES
1211: pt arrives to pacu. Pt on room air. Pt responds to verbal stimulation. VSS. Respirations unlabored. X4 sites on abdomen. Pt shaking uncontrollably. [place] warmer placed on pt   1214: pt given 0.625mg of droperidol   1216: pt given 12.5mg of demerol   1221: pt given 12.5mg of demerol   1230: pt still shaking. Dr. Jaren Aden said no more demerol due to pt being on suboxone   1241: pt meets discharge criteria from pacu.  Pt transported to [facility]

## 2023-08-30 ENCOUNTER — TELEPHONE (OUTPATIENT)
Dept: SURGERY | Age: 33
End: 2023-08-30

## 2023-08-30 NOTE — OP NOTE
[place] [address]                                OPERATIVE REPORT    PATIENT NAME: Canelo Lamas                   :        1990  MED REC NO:   [id number]                           ROOM:  ACCOUNT NO:   [de-identified]                           ADMIT DATE: 2023  PROVIDER:     DENYS Cyr Peat OF PROCEDURE:  2023    PREOPERATIVE DIAGNOSES:  1. Right lower quadrant abdominal pain. 2.  Chronic appendicitis. 3.  Appendicolith. POSTOPERATIVE DIAGNOSES:  1. Right lower quadrant abdominal pain. 2.  Chronic appendicitis. 3.  Appendicolith. OPERATION PERFORMED:  Robotic laparoscopic appendectomy. SURGEON:  Gifty Fernández MD    ASSISTANT:  Andre Michael. ANESTHESIA:  General/local.    ESTIMATED BLOOD LOSS:   5 mL. DRAINS:  None. COMPLICATIONS:  None. DISPOSITION:  Stable to the recovery room. INDICATIONS:  The patient is a 44-year-old male who I had seen in the  office secondary to chronic right lower quadrant abdominal pain  associated with appendicolith and concern for chronic appendicitis. Both operative and nonoperative intervention plans were discussed. Risks of surgery were further discussed. Some of the risks included but  were not limited to bleeding, infection, the need for re-operation,  severe chronic postoperative pain or numbness, major vascular or nerve  injury, cardiopulmonary complications, anesthetic complications, seroma  or hematoma formation, wound breakdown, trocar site herniation, staple  line breakdown, chronic pain, and death. After all of the questions  were answered in their entirety and the patient was completely aware of  current situation, he wished to proceed with surgery.     DESCRIPTION OF PROCEDURE:  After informed consent was signed and placed  on the chart, the patient was taken back to the operating room and  placed supine on the

## 2023-08-30 NOTE — TELEPHONE ENCOUNTER
Called pt for S/P Robotic laparoscopic appendectomy. Pt stated no concerns at this time. Advised if taking any pain meds to add a stool softener with it. Pt notified of f/u appt time and date. Advised to call if any questions or concerns.

## 2023-09-07 ENCOUNTER — OFFICE VISIT (OUTPATIENT)
Dept: INTERNAL MEDICINE CLINIC | Age: 33
End: 2023-09-07
Payer: COMMERCIAL

## 2023-09-07 VITALS
DIASTOLIC BLOOD PRESSURE: 74 MMHG | HEIGHT: 73 IN | BODY MASS INDEX: 20.54 KG/M2 | SYSTOLIC BLOOD PRESSURE: 130 MMHG | WEIGHT: 155 LBS | HEART RATE: 94 BPM

## 2023-09-07 DIAGNOSIS — F11.20 SEVERE OPIOID USE DISORDER (HCC): Primary | ICD-10-CM

## 2023-09-07 PROCEDURE — 4004F PT TOBACCO SCREEN RCVD TLK: CPT | Performed by: NURSE PRACTITIONER

## 2023-09-07 PROCEDURE — 99214 OFFICE O/P EST MOD 30 MIN: CPT | Performed by: NURSE PRACTITIONER

## 2023-09-07 PROCEDURE — G8427 DOCREV CUR MEDS BY ELIG CLIN: HCPCS | Performed by: NURSE PRACTITIONER

## 2023-09-07 PROCEDURE — G8420 CALC BMI NORM PARAMETERS: HCPCS | Performed by: NURSE PRACTITIONER

## 2023-09-07 PROCEDURE — 80305 DRUG TEST PRSMV DIR OPT OBS: CPT | Performed by: NURSE PRACTITIONER

## 2023-09-07 RX ORDER — BUPRENORPHINE AND NALOXONE 12; 3 MG/1; MG/1
1 FILM, SOLUBLE BUCCAL; SUBLINGUAL DAILY
Qty: 28 FILM | Refills: 0 | Status: SHIPPED | OUTPATIENT
Start: 2023-09-07 | End: 2023-10-05

## 2023-09-07 NOTE — PROGRESS NOTES
Verbal order per Bradley Harrell CNP for urine drug screen. Positive for BUP THC. Verified results with Sonya DAMON LPN.
Patient will be maintained on suboxone medication.   The importance of combining medical assisted treatment with comprehensive treatment including counseling, support groups, and psychiatry as applicable was discussed with patient      Orders Placed This Encounter   Procedures    POCT Rapid Drug Screen        DOMINIC Liao CNP 09/07/23 5:25 PM

## 2023-09-11 DIAGNOSIS — R10.31 RLQ ABDOMINAL PAIN: ICD-10-CM

## 2023-09-11 DIAGNOSIS — K38.9 APPENDICOLITH: ICD-10-CM

## 2023-09-13 ENCOUNTER — OFFICE VISIT (OUTPATIENT)
Dept: SURGERY | Age: 33
End: 2023-09-13

## 2023-09-13 VITALS
RESPIRATION RATE: 18 BRPM | HEART RATE: 81 BPM | BODY MASS INDEX: 20.28 KG/M2 | TEMPERATURE: 97.7 F | WEIGHT: 153 LBS | DIASTOLIC BLOOD PRESSURE: 70 MMHG | OXYGEN SATURATION: 99 % | SYSTOLIC BLOOD PRESSURE: 124 MMHG | HEIGHT: 73 IN

## 2023-09-13 DIAGNOSIS — Z90.49 S/P LAPAROSCOPIC APPENDECTOMY: Primary | ICD-10-CM

## 2023-09-13 PROCEDURE — 99024 POSTOP FOLLOW-UP VISIT: CPT | Performed by: NURSE PRACTITIONER

## 2023-09-13 NOTE — PROGRESS NOTES
<Sign Out Dr. Waldo Vick M.D., F.C. A. P     Patient Active Problem List   Diagnosis    Lower abdominal pain of unknown etiology    Nonspecific abnormal findings on diagnostic imaging    Appendicolith       Assessment:     Status post robotic laparoscopic appendectomy for right sided chronic pain   Weight loss     Plan:     Abdomen benign. Incisions are healing well without signs of infection. Continue wound care as directed. Pathology reviewed and discussed with patient. Appetite doing well. Continue diet as tolerated. High protein supplements encouraged. Bowel function doing well  Denies pain. Tylenol and/or Motrin as needed for discomfort. Okay to return to work on Monday without restrictions  If weight loss continues would recommend colonoscopy   Follow up as needed with us. Signs and symptoms reviewed with patient that would be concerning and need him to return to office for re-evaluation. Patient states he will call if he has questions or concerns.   Follow up with PCP as directed      Electronically signed by Macario Bamberger, APRN - CNP on 9/14/2023 at 6:01 PM

## 2023-09-14 ASSESSMENT — ENCOUNTER SYMPTOMS
SINUS PRESSURE: 0
DIARRHEA: 0
CONSTIPATION: 0
BACK PAIN: 0
CHEST TIGHTNESS: 0
BLOOD IN STOOL: 0
RHINORRHEA: 0
PHOTOPHOBIA: 0
EYE PAIN: 0
APNEA: 0
CHOKING: 0
ABDOMINAL DISTENTION: 0
ABDOMINAL PAIN: 0
ANAL BLEEDING: 0
SORE THROAT: 0
EYE DISCHARGE: 0
COLOR CHANGE: 0
COUGH: 0
WHEEZING: 0
SHORTNESS OF BREATH: 0
VOMITING: 0
ALLERGIC/IMMUNOLOGIC NEGATIVE: 1
EYE ITCHING: 0
NAUSEA: 0

## 2023-10-05 ENCOUNTER — OFFICE VISIT (OUTPATIENT)
Dept: INTERNAL MEDICINE CLINIC | Age: 33
End: 2023-10-05
Payer: COMMERCIAL

## 2023-10-05 VITALS
BODY MASS INDEX: 21.02 KG/M2 | RESPIRATION RATE: 20 BRPM | WEIGHT: 158.6 LBS | HEART RATE: 80 BPM | DIASTOLIC BLOOD PRESSURE: 62 MMHG | HEIGHT: 73 IN | SYSTOLIC BLOOD PRESSURE: 134 MMHG

## 2023-10-05 DIAGNOSIS — F11.20 SEVERE OPIOID USE DISORDER (HCC): ICD-10-CM

## 2023-10-05 PROCEDURE — G8427 DOCREV CUR MEDS BY ELIG CLIN: HCPCS | Performed by: NURSE PRACTITIONER

## 2023-10-05 PROCEDURE — 4004F PT TOBACCO SCREEN RCVD TLK: CPT | Performed by: NURSE PRACTITIONER

## 2023-10-05 PROCEDURE — G8484 FLU IMMUNIZE NO ADMIN: HCPCS | Performed by: NURSE PRACTITIONER

## 2023-10-05 PROCEDURE — 80305 DRUG TEST PRSMV DIR OPT OBS: CPT | Performed by: NURSE PRACTITIONER

## 2023-10-05 PROCEDURE — G8420 CALC BMI NORM PARAMETERS: HCPCS | Performed by: NURSE PRACTITIONER

## 2023-10-05 PROCEDURE — 99214 OFFICE O/P EST MOD 30 MIN: CPT | Performed by: NURSE PRACTITIONER

## 2023-10-05 RX ORDER — BUPRENORPHINE AND NALOXONE 12; 3 MG/1; MG/1
1 FILM, SOLUBLE BUCCAL; SUBLINGUAL DAILY
Qty: 28 FILM | Refills: 0 | Status: SHIPPED | OUTPATIENT
Start: 2023-10-05 | End: 2023-11-02

## 2023-10-05 NOTE — PROGRESS NOTES
10/05/23   The patients primary care physician is Shani Hong, DOMINIC - CNP    Elenitaiveth Ольга is a 35 y.o.  male who presents in office today for follow up medication assisted treatment, substance use disorder. He was a routine MAT pt of Dr Elias Mullins and transferred care to [facility]. Pt required late evening appts due to employment so began seeing me last month 22    Pt had appendectomy on . He has been back to work 2 weeks now. States feeling well. Cleared by surgeon. No concerns voiced. Pt denies any urges, triggers, or cravings.      UDS acceptable    Pertinent Drug History  Patient has had problems using heroin/fentanyl, pain pills  Patient started using [id number] years ago  He said his brother had  of a heroin overdose  Patient uses it snorting  Other drugs used: THC  Suboxone programs in the past no  Vivitrol in the past no  Patient would typically use $40-$50 worth of heroin/fentanyl daily  Past Medical History:   Diagnosis Date    Opioid use disorder          Social History     Socioeconomic History    Marital status: Single     Spouse name: Not on file    Number of children: Not on file    Years of education: Not on file    Highest education level: Not on file   Occupational History    Not on file   Tobacco Use    Smoking status: Every Day     Packs/day: 1     Types: Cigarettes    Smokeless tobacco: Never   Substance and Sexual Activity    Alcohol use: Not Currently     Comment: social/occasional    Drug use: Yes     Types: Marijuana Viola Raven)     Comment: last used fentanyl 20    Sexual activity: Yes     Partners: Female   Other Topics Concern    Not on file   Social History Narrative    Not on file     Social Determinants of Health     Financial Resource Strain: Not on file   Food Insecurity: Not on file   Transportation Needs: Not on file   Physical Activity: Not on file   Stress: Not on file   Social Connections: Not on file   Intimate Partner Violence: Not on file

## 2023-11-02 ENCOUNTER — TELEPHONE (OUTPATIENT)
Dept: INTERNAL MEDICINE CLINIC | Age: 33
End: 2023-11-02

## 2023-11-02 ENCOUNTER — NURSE ONLY (OUTPATIENT)
Dept: INTERNAL MEDICINE CLINIC | Age: 33
End: 2023-11-02
Payer: COMMERCIAL

## 2023-11-02 VITALS
HEIGHT: 73 IN | WEIGHT: 161 LBS | SYSTOLIC BLOOD PRESSURE: 139 MMHG | DIASTOLIC BLOOD PRESSURE: 63 MMHG | BODY MASS INDEX: 21.34 KG/M2 | HEART RATE: 71 BPM

## 2023-11-02 DIAGNOSIS — F11.20 SEVERE OPIOID USE DISORDER (HCC): Primary | ICD-10-CM

## 2023-11-02 PROCEDURE — 99211 OFF/OP EST MAY X REQ PHY/QHP: CPT | Performed by: NURSE PRACTITIONER

## 2023-11-02 PROCEDURE — 80305 DRUG TEST PRSMV DIR OPT OBS: CPT | Performed by: NURSE PRACTITIONER

## 2023-11-02 RX ORDER — BUPRENORPHINE AND NALOXONE 12; 3 MG/1; MG/1
1 FILM, SOLUBLE BUCCAL; SUBLINGUAL DAILY
Qty: 28 FILM | Refills: 0 | OUTPATIENT
Start: 2023-11-02 | End: 2023-11-30

## 2023-11-02 NOTE — TELEPHONE ENCOUNTER
LPN called in script of Suboxone 12 mg film daily for 28 days qty 28 into [facility] in [place], [place].

## 2023-11-30 ENCOUNTER — OFFICE VISIT (OUTPATIENT)
Dept: INTERNAL MEDICINE CLINIC | Age: 33
End: 2023-11-30
Payer: COMMERCIAL

## 2023-11-30 VITALS
HEART RATE: 77 BPM | WEIGHT: 158 LBS | BODY MASS INDEX: 20.94 KG/M2 | HEIGHT: 73 IN | SYSTOLIC BLOOD PRESSURE: 133 MMHG | DIASTOLIC BLOOD PRESSURE: 70 MMHG

## 2023-11-30 DIAGNOSIS — F11.20 SEVERE OPIOID USE DISORDER (HCC): Primary | ICD-10-CM

## 2023-11-30 PROCEDURE — 80305 DRUG TEST PRSMV DIR OPT OBS: CPT | Performed by: NURSE PRACTITIONER

## 2023-11-30 PROCEDURE — G8420 CALC BMI NORM PARAMETERS: HCPCS | Performed by: NURSE PRACTITIONER

## 2023-11-30 PROCEDURE — G8484 FLU IMMUNIZE NO ADMIN: HCPCS | Performed by: NURSE PRACTITIONER

## 2023-11-30 PROCEDURE — G8427 DOCREV CUR MEDS BY ELIG CLIN: HCPCS | Performed by: NURSE PRACTITIONER

## 2023-11-30 PROCEDURE — 4004F PT TOBACCO SCREEN RCVD TLK: CPT | Performed by: NURSE PRACTITIONER

## 2023-11-30 PROCEDURE — 99214 OFFICE O/P EST MOD 30 MIN: CPT | Performed by: NURSE PRACTITIONER

## 2023-11-30 RX ORDER — BUPRENORPHINE AND NALOXONE 12; 3 MG/1; MG/1
1 FILM, SOLUBLE BUCCAL; SUBLINGUAL DAILY
Qty: 28 FILM | Refills: 0 | Status: SHIPPED | OUTPATIENT
Start: 2023-11-30 | End: 2023-12-28

## 2023-11-30 NOTE — PROGRESS NOTES
Verbal order per Anupaam Collado CNP for urine drug screen. Positive for BUP THC. Verified results with Sonya DAMON LPN.

## 2023-11-30 NOTE — PROGRESS NOTES
23   The patients primary care physician is Esmer Ruano, DOMINIC Bradley CNP    Kristina Jones is a 35 y.o.  male who presents in office today for follow up medication assisted treatment, substance use disorder. He was a routine MAT pt of Dr Trell Mojica and transferred care to [facility]. Pt required late evening appts due to employment so began seeing me last month 22    Pt denies any urges, triggers, or cravings. UDS acceptable    No new concerns voiced.        Pertinent Drug History  Patient has had problems using heroin/fentanyl, pain pills  Patient started using [id number] years ago  He said his brother had  of a heroin overdose  Patient uses it snorting  Other drugs used: THC  Suboxone programs in the past no  Vivitrol in the past no  Patient would typically use $40-$50 worth of heroin/fentanyl daily  Past Medical History:   Diagnosis Date    Opioid use disorder          Social History     Socioeconomic History    Marital status: Single     Spouse name: Not on file    Number of children: Not on file    Years of education: Not on file    Highest education level: Not on file   Occupational History    Not on file   Tobacco Use    Smoking status: Every Day     Packs/day: 1     Types: Cigarettes    Smokeless tobacco: Never   Substance and Sexual Activity    Alcohol use: Not Currently     Comment: social/occasional    Drug use: Yes     Types: Marijuana (Weed)     Comment: last used fentanyl 20    Sexual activity: Yes     Partners: Female   Other Topics Concern    Not on file   Social History Narrative    Not on file     Social Determinants of Health     Financial Resource Strain: Not on file   Food Insecurity: Not on file   Transportation Needs: Not on file   Physical Activity: Not on file   Stress: Not on file   Social Connections: Not on file   Intimate Partner Violence: Not on file   Housing Stability: Not on file         Current Outpatient Medications on File Prior to Visit   Medication

## 2023-12-28 ENCOUNTER — OFFICE VISIT (OUTPATIENT)
Dept: INTERNAL MEDICINE CLINIC | Age: 33
End: 2023-12-28
Payer: COMMERCIAL

## 2023-12-28 VITALS
BODY MASS INDEX: 22.4 KG/M2 | RESPIRATION RATE: 16 BRPM | DIASTOLIC BLOOD PRESSURE: 63 MMHG | WEIGHT: 160 LBS | SYSTOLIC BLOOD PRESSURE: 132 MMHG | HEIGHT: 71 IN | HEART RATE: 70 BPM

## 2023-12-28 DIAGNOSIS — F11.20 SEVERE OPIOID USE DISORDER (HCC): Primary | ICD-10-CM

## 2023-12-28 PROCEDURE — 4004F PT TOBACCO SCREEN RCVD TLK: CPT | Performed by: NURSE PRACTITIONER

## 2023-12-28 PROCEDURE — G8484 FLU IMMUNIZE NO ADMIN: HCPCS | Performed by: NURSE PRACTITIONER

## 2023-12-28 PROCEDURE — 80305 DRUG TEST PRSMV DIR OPT OBS: CPT | Performed by: NURSE PRACTITIONER

## 2023-12-28 PROCEDURE — G8420 CALC BMI NORM PARAMETERS: HCPCS | Performed by: NURSE PRACTITIONER

## 2023-12-28 PROCEDURE — G8427 DOCREV CUR MEDS BY ELIG CLIN: HCPCS | Performed by: NURSE PRACTITIONER

## 2023-12-28 PROCEDURE — 99214 OFFICE O/P EST MOD 30 MIN: CPT | Performed by: NURSE PRACTITIONER

## 2023-12-28 RX ORDER — BUPRENORPHINE AND NALOXONE 12; 3 MG/1; MG/1
1 FILM, SOLUBLE BUCCAL; SUBLINGUAL DAILY
Qty: 28 FILM | Refills: 0 | Status: SHIPPED | OUTPATIENT
Start: 2023-12-28 | End: 2024-01-25

## 2023-12-28 NOTE — PROGRESS NOTES
23   The patients primary care physician is DOMINIC Tejeda CNP    Ahsan Chen is a 35 y.o.  male who presents in office today for follow up medication assisted treatment, substance use disorder. He was a routine MAT pt of Dr Edson Mejia and transferred care to [facility]. Pt required late evening appts due to employment so began seeing me last month 22     Pt denies any urges, triggers, or cravings. Reports he has been off work since  for vacation days.  Went back to work today    UDS acceptable    Denies any concerns or additional needs    Pertinent Drug History  Patient has had problems using heroin/fentanyl, pain pills  Patient started using [id number] years ago  He said his brother had  of a heroin overdose  Patient uses it snorting  Other drugs used: THC  Suboxone programs in the past no  Vivitrol in the past no  Patient would typically use $40-$50 worth of heroin/fentanyl daily  Past Medical History:   Diagnosis Date    Opioid use disorder          Social History     Socioeconomic History    Marital status: Single     Spouse name: Not on file    Number of children: Not on file    Years of education: Not on file    Highest education level: Not on file   Occupational History    Not on file   Tobacco Use    Smoking status: Every Day     Current packs/day: 1.00     Types: Cigarettes    Smokeless tobacco: Never   Substance and Sexual Activity    Alcohol use: Not Currently     Comment: social/occasional    Drug use: Yes     Types: Marijuana (Weed)     Comment: last used fentanyl 20    Sexual activity: Yes     Partners: Female   Other Topics Concern    Not on file   Social History Narrative    Not on file     Social Determinants of Health     Financial Resource Strain: Not on file   Food Insecurity: Not on file   Transportation Needs: Not on file   Physical Activity: Not on file   Stress: Not on file   Social Connections: Not on file   Intimate Partner Violence: Not on

## 2023-12-28 NOTE — PROGRESS NOTES
Verbal order per Barney Mcmillan CNP for urine drug screen. Positive for BUP,THC. Verified results with Anastacia Sweeney LPN.

## 2024-01-25 ENCOUNTER — OFFICE VISIT (OUTPATIENT)
Dept: INTERNAL MEDICINE CLINIC | Age: 34
End: 2024-01-25
Payer: COMMERCIAL

## 2024-01-25 VITALS
DIASTOLIC BLOOD PRESSURE: 68 MMHG | BODY MASS INDEX: 23.62 KG/M2 | HEART RATE: 78 BPM | WEIGHT: 165 LBS | SYSTOLIC BLOOD PRESSURE: 142 MMHG | HEIGHT: 70 IN

## 2024-01-25 DIAGNOSIS — F11.20 SEVERE OPIOID USE DISORDER (HCC): Primary | ICD-10-CM

## 2024-01-25 PROCEDURE — 80305 DRUG TEST PRSMV DIR OPT OBS: CPT | Performed by: NURSE PRACTITIONER

## 2024-01-25 PROCEDURE — G8427 DOCREV CUR MEDS BY ELIG CLIN: HCPCS | Performed by: NURSE PRACTITIONER

## 2024-01-25 PROCEDURE — 4004F PT TOBACCO SCREEN RCVD TLK: CPT | Performed by: NURSE PRACTITIONER

## 2024-01-25 PROCEDURE — G8484 FLU IMMUNIZE NO ADMIN: HCPCS | Performed by: NURSE PRACTITIONER

## 2024-01-25 PROCEDURE — 99214 OFFICE O/P EST MOD 30 MIN: CPT | Performed by: NURSE PRACTITIONER

## 2024-01-25 PROCEDURE — G8420 CALC BMI NORM PARAMETERS: HCPCS | Performed by: NURSE PRACTITIONER

## 2024-01-25 RX ORDER — BUPRENORPHINE AND NALOXONE 12; 3 MG/1; MG/1
1 FILM, SOLUBLE BUCCAL; SUBLINGUAL DAILY
Qty: 28 FILM | Refills: 0 | Status: SHIPPED | OUTPATIENT
Start: 2024-01-25 | End: 2024-02-22

## 2024-01-25 NOTE — PROGRESS NOTES
24   The patients primary care physician is Radha Tovar, DOMINIC - ALFRED    Jomar Morel is a 33 y.o.  male who presents in office today for follow up medication assisted treatment, substance use disorder.   He was a routine MAT pt of Dr Madison and transferred care to [facility]. Pt required late evening appts due to employment so began seeing me last month 22      Pt denies any urges, triggers, or cravings.     UDS acceptable    Denies any concerns related to mood and sleep    Pertinent Drug History  Patient has had problems using heroin/fentanyl, pain pills  Patient started using [id number] years ago  He said his brother had  of a heroin overdose  Patient uses it snorting  Other drugs used: THC  Suboxone programs in the past no  Vivitrol in the past no  Patient would typically use $40-$50 worth of heroin/fentanyl daily  Past Medical History:   Diagnosis Date    Opioid use disorder          Social History     Socioeconomic History    Marital status: Single     Spouse name: Not on file    Number of children: Not on file    Years of education: Not on file    Highest education level: Not on file   Occupational History    Not on file   Tobacco Use    Smoking status: Every Day     Current packs/day: 1.00     Types: Cigarettes    Smokeless tobacco: Never   Substance and Sexual Activity    Alcohol use: Not Currently     Comment: social/occasional    Drug use: Yes     Types: Marijuana (Weed)     Comment: last used fentanyl 20    Sexual activity: Yes     Partners: Female   Other Topics Concern    Not on file   Social History Narrative    Not on file     Social Determinants of Health     Financial Resource Strain: Not on file   Food Insecurity: Not on file   Transportation Needs: Not on file   Physical Activity: Not on file   Stress: Not on file   Social Connections: Not on file   Intimate Partner Violence: Not on file   Housing Stability: Not on file         Current Outpatient Medications on

## 2024-01-25 NOTE — PROGRESS NOTES
Verbal order per Yuni SHRESTHA CNP for urine drug screen. Positive for BUP THC. Verified results with Sonya DAMON LPN.

## 2024-02-22 ENCOUNTER — OFFICE VISIT (OUTPATIENT)
Dept: INTERNAL MEDICINE CLINIC | Age: 34
End: 2024-02-22
Payer: COMMERCIAL

## 2024-02-22 VITALS
HEART RATE: 64 BPM | WEIGHT: 158 LBS | SYSTOLIC BLOOD PRESSURE: 123 MMHG | DIASTOLIC BLOOD PRESSURE: 57 MMHG | RESPIRATION RATE: 16 BRPM | BODY MASS INDEX: 22.67 KG/M2

## 2024-02-22 DIAGNOSIS — F11.20 SEVERE OPIOID USE DISORDER (HCC): Primary | ICD-10-CM

## 2024-02-22 DIAGNOSIS — R63.4 WEIGHT LOSS: ICD-10-CM

## 2024-02-22 LAB
ALBUMIN SERPL BCG-MCNC: 4.8 G/DL (ref 3.5–5.1)
ALCOHOL URINE: ABNORMAL
ALP SERPL-CCNC: 61 U/L (ref 38–126)
ALT SERPL W/O P-5'-P-CCNC: 17 U/L (ref 11–66)
AMPHETAMINE SCREEN, URINE: ABNORMAL
ANION GAP SERPL CALC-SCNC: 9 MEQ/L (ref 8–16)
AST SERPL-CCNC: 21 U/L (ref 5–40)
BARBITURATE SCREEN, URINE: ABNORMAL
BASOPHILS ABSOLUTE: 0 THOU/MM3 (ref 0–0.1)
BASOPHILS NFR BLD AUTO: 0.6 %
BENZODIAZEPINE SCREEN, URINE: ABNORMAL
BILIRUB SERPL-MCNC: 0.3 MG/DL (ref 0.3–1.2)
BUN SERPL-MCNC: 17 MG/DL (ref 7–22)
BUPRENORPHINE URINE: ABNORMAL
CALCIUM SERPL-MCNC: 9.8 MG/DL (ref 8.5–10.5)
CHLORIDE SERPL-SCNC: 102 MEQ/L (ref 98–111)
CO2 SERPL-SCNC: 30 MEQ/L (ref 23–33)
COCAINE METABOLITE SCREEN URINE: ABNORMAL
CREAT SERPL-MCNC: 0.9 MG/DL (ref 0.4–1.2)
DEPRECATED RDW RBC AUTO: 42 FL (ref 35–45)
EOSINOPHIL NFR BLD AUTO: 3.9 %
EOSINOPHILS ABSOLUTE: 0.2 THOU/MM3 (ref 0–0.4)
ERYTHROCYTE [DISTWIDTH] IN BLOOD BY AUTOMATED COUNT: 12.5 % (ref 11.5–14.5)
FENTANYL SCREEN, URINE: ABNORMAL
GABAPENTIN SCREEN, URINE: ABNORMAL
GFR SERPL CREATININE-BSD FRML MDRD: > 60 ML/MIN/1.73M2
GLUCOSE SERPL-MCNC: 105 MG/DL (ref 70–108)
HCT VFR BLD AUTO: 42.5 % (ref 42–52)
HGB BLD-MCNC: 13.9 GM/DL (ref 14–18)
IMM GRANULOCYTES # BLD AUTO: 0.01 THOU/MM3 (ref 0–0.07)
IMM GRANULOCYTES NFR BLD AUTO: 0.2 %
LYMPHOCYTES ABSOLUTE: 1.8 THOU/MM3 (ref 1–4.8)
LYMPHOCYTES NFR BLD AUTO: 36.1 %
MCH RBC QN AUTO: 30.1 PG (ref 26–33)
MCHC RBC AUTO-ENTMCNC: 32.7 GM/DL (ref 32.2–35.5)
MCV RBC AUTO: 92 FL (ref 80–94)
MDMA URINE: ABNORMAL
METHADONE SCREEN, URINE: ABNORMAL
METHAMPHETAMINE, URINE: ABNORMAL
MONOCYTES ABSOLUTE: 0.3 THOU/MM3 (ref 0.4–1.3)
MONOCYTES NFR BLD AUTO: 5.6 %
NEUTROPHILS NFR BLD AUTO: 53.6 %
NRBC BLD AUTO-RTO: 0 /100 WBC
OPIATE SCREEN URINE: ABNORMAL
OXYCODONE SCREEN URINE: ABNORMAL
PHENCYCLIDINE SCREEN URINE: ABNORMAL
PLATELET # BLD AUTO: 103 THOU/MM3 (ref 130–400)
PMV BLD AUTO: 13.6 FL (ref 9.4–12.4)
POTASSIUM SERPL-SCNC: 4.2 MEQ/L (ref 3.5–5.2)
PROPOXYPHENE SCREEN, URINE: ABNORMAL
PROT SERPL-MCNC: 8.2 G/DL (ref 6.1–8)
RBC # BLD AUTO: 4.62 MILL/MM3 (ref 4.7–6.1)
SCAN OF BLOOD SMEAR: NORMAL
SEGMENTED NEUTROPHILS ABSOLUTE COUNT: 2.6 THOU/MM3 (ref 1.8–7.7)
SODIUM SERPL-SCNC: 141 MEQ/L (ref 135–145)
SYNTHETIC CANNABINOIDS(K2) SCREEN, URINE: ABNORMAL
THC SCREEN, URINE: ABNORMAL
TRAMADOL SCREEN URINE: ABNORMAL
TRICYCLIC ANTIDEPRESSANTS, UR: ABNORMAL
TSH SERPL DL<=0.005 MIU/L-ACNC: 1.52 UIU/ML (ref 0.4–4.2)
WBC # BLD AUTO: 4.9 THOU/MM3 (ref 4.8–10.8)

## 2024-02-22 PROCEDURE — G8420 CALC BMI NORM PARAMETERS: HCPCS | Performed by: NURSE PRACTITIONER

## 2024-02-22 PROCEDURE — G8484 FLU IMMUNIZE NO ADMIN: HCPCS | Performed by: NURSE PRACTITIONER

## 2024-02-22 PROCEDURE — G8427 DOCREV CUR MEDS BY ELIG CLIN: HCPCS | Performed by: NURSE PRACTITIONER

## 2024-02-22 PROCEDURE — 4004F PT TOBACCO SCREEN RCVD TLK: CPT | Performed by: NURSE PRACTITIONER

## 2024-02-22 PROCEDURE — 80305 DRUG TEST PRSMV DIR OPT OBS: CPT | Performed by: NURSE PRACTITIONER

## 2024-02-22 PROCEDURE — 99214 OFFICE O/P EST MOD 30 MIN: CPT | Performed by: NURSE PRACTITIONER

## 2024-02-22 RX ORDER — BUPRENORPHINE AND NALOXONE 12; 3 MG/1; MG/1
1 FILM, SOLUBLE BUCCAL; SUBLINGUAL DAILY
Qty: 28 FILM | Refills: 0 | Status: SHIPPED | OUTPATIENT
Start: 2024-02-22 | End: 2024-03-21

## 2024-02-22 SDOH — HEALTH STABILITY: MENTAL HEALTH
STRESS IS WHEN SOMEONE FEELS TENSE, NERVOUS, ANXIOUS, OR CAN'T SLEEP AT NIGHT BECAUSE THEIR MIND IS TROUBLED. HOW STRESSED ARE YOU?: ONLY A LITTLE

## 2024-02-22 SDOH — ECONOMIC STABILITY: INCOME INSECURITY: HOW HARD IS IT FOR YOU TO PAY FOR THE VERY BASICS LIKE FOOD, HOUSING, MEDICAL CARE, AND HEATING?: NOT VERY HARD

## 2024-02-22 SDOH — SOCIAL STABILITY: SOCIAL NETWORK: HOW OFTEN DO YOU GET TOGETHER WITH FRIENDS OR RELATIVES?: NEVER

## 2024-02-22 SDOH — HEALTH STABILITY: MENTAL HEALTH: HOW OFTEN DO YOU HAVE A DRINK CONTAINING ALCOHOL?: NEVER

## 2024-02-22 SDOH — SOCIAL STABILITY: SOCIAL INSECURITY
WITHIN THE LAST YEAR, HAVE TO BEEN RAPED OR FORCED TO HAVE ANY KIND OF SEXUAL ACTIVITY BY YOUR PARTNER OR EX-PARTNER?: NO

## 2024-02-22 SDOH — ECONOMIC STABILITY: INCOME INSECURITY: IN THE LAST 12 MONTHS, WAS THERE A TIME WHEN YOU WERE NOT ABLE TO PAY THE MORTGAGE OR RENT ON TIME?: NO

## 2024-02-22 SDOH — HEALTH STABILITY: PHYSICAL HEALTH: ON AVERAGE, HOW MANY MINUTES DO YOU ENGAGE IN EXERCISE AT THIS LEVEL?: 0 MIN

## 2024-02-22 SDOH — SOCIAL STABILITY: SOCIAL INSECURITY: WITHIN THE LAST YEAR, HAVE YOU BEEN AFRAID OF YOUR PARTNER OR EX-PARTNER?: NO

## 2024-02-22 SDOH — HEALTH STABILITY: MENTAL HEALTH: HOW MANY STANDARD DRINKS CONTAINING ALCOHOL DO YOU HAVE ON A TYPICAL DAY?: PATIENT DOES NOT DRINK

## 2024-02-22 SDOH — SOCIAL STABILITY: SOCIAL NETWORK: HOW OFTEN DO YOU ATTENT MEETINGS OF THE CLUB OR ORGANIZATION YOU BELONG TO?: NEVER

## 2024-02-22 SDOH — SOCIAL STABILITY: SOCIAL NETWORK: ARE YOU MARRIED, WIDOWED, DIVORCED, SEPARATED, NEVER MARRIED, OR LIVING WITH A PARTNER?: LIVING WITH PARTNER

## 2024-02-22 SDOH — ECONOMIC STABILITY: FOOD INSECURITY: WITHIN THE PAST 12 MONTHS, YOU WORRIED THAT YOUR FOOD WOULD RUN OUT BEFORE YOU GOT MONEY TO BUY MORE.: NEVER TRUE

## 2024-02-22 SDOH — SOCIAL STABILITY: SOCIAL NETWORK: IN A TYPICAL WEEK, HOW MANY TIMES DO YOU TALK ON THE PHONE WITH FAMILY, FRIENDS, OR NEIGHBORS?: ONCE A WEEK

## 2024-02-22 SDOH — ECONOMIC STABILITY: HOUSING INSECURITY
IN THE LAST 12 MONTHS, WAS THERE A TIME WHEN YOU DID NOT HAVE A STEADY PLACE TO SLEEP OR SLEPT IN A SHELTER (INCLUDING NOW)?: NO

## 2024-02-22 SDOH — SOCIAL STABILITY: SOCIAL NETWORK: HOW OFTEN DO YOU ATTEND CHURCH OR RELIGIOUS SERVICES?: NEVER

## 2024-02-22 SDOH — SOCIAL STABILITY: SOCIAL INSECURITY
WITHIN THE LAST YEAR, HAVE YOU BEEN KICKED, HIT, SLAPPED, OR OTHERWISE PHYSICALLY HURT BY YOUR PARTNER OR EX-PARTNER?: NO

## 2024-02-22 SDOH — ECONOMIC STABILITY: TRANSPORTATION INSECURITY
IN THE PAST 12 MONTHS, HAS THE LACK OF TRANSPORTATION KEPT YOU FROM MEDICAL APPOINTMENTS OR FROM GETTING MEDICATIONS?: NO

## 2024-02-22 SDOH — ECONOMIC STABILITY: FOOD INSECURITY: WITHIN THE PAST 12 MONTHS, THE FOOD YOU BOUGHT JUST DIDN'T LAST AND YOU DIDN'T HAVE MONEY TO GET MORE.: NEVER TRUE

## 2024-02-22 SDOH — HEALTH STABILITY: PHYSICAL HEALTH: ON AVERAGE, HOW MANY DAYS PER WEEK DO YOU ENGAGE IN MODERATE TO STRENUOUS EXERCISE (LIKE A BRISK WALK)?: 0 DAYS

## 2024-02-22 SDOH — SOCIAL STABILITY: SOCIAL INSECURITY: WITHIN THE LAST YEAR, HAVE YOU BEEN HUMILIATED OR EMOTIONALLY ABUSED IN OTHER WAYS BY YOUR PARTNER OR EX-PARTNER?: NO

## 2024-02-22 ASSESSMENT — PATIENT HEALTH QUESTIONNAIRE - PHQ9
SUM OF ALL RESPONSES TO PHQ QUESTIONS 1-9: 2
1. LITTLE INTEREST OR PLEASURE IN DOING THINGS: 1
SUM OF ALL RESPONSES TO PHQ QUESTIONS 1-9: 2
SUM OF ALL RESPONSES TO PHQ9 QUESTIONS 1 & 2: 2
2. FEELING DOWN, DEPRESSED OR HOPELESS: 1

## 2024-02-22 NOTE — PROGRESS NOTES
24   The patients primary care physician is Radha Tovar, DOMINIC - ALFRED    Jomar Morel is a 34 y.o.  male who presents in office today for follow up medication assisted treatment, substance use disorder.   He was a routine MAT pt of Dr Madison and transferred care to [facility]. Pt required late evening appts so I assmed care in 2023    Pt denies any urges, triggers, or cravings.     UDS acceptable    Pt agrees to routine labs in office today    No new concerns voiced. Denies symptoms related to anxiety, sleep, or mood. PHQ 9 completed, scored 4. He does  not feel any medication or counseling services are needed      Pertinent Drug History  Patient has had problems using heroin/fentanyl, pain pills  Patient started using [id number] years ago  He said his brother had  of a heroin overdose  Patient uses it snorting  Other drugs used: THC  Suboxone programs in the past no  Vivitrol in the past no  Patient would typically use $40-$50 worth of heroin/fentanyl daily       Past Medical History:   Diagnosis Date    Opioid use disorder          Social History     Socioeconomic History    Marital status: Single     Spouse name: Not on file    Number of children: Not on file    Years of education: Not on file    Highest education level: Not on file   Occupational History    Not on file   Tobacco Use    Smoking status: Every Day     Current packs/day: 1.00     Types: Cigarettes    Smokeless tobacco: Never   Substance and Sexual Activity    Alcohol use: Not Currently     Comment: social/occasional    Drug use: Yes     Types: Marijuana (Weed)     Comment: last used fentanyl 20    Sexual activity: Yes     Partners: Female   Other Topics Concern    Not on file   Social History Narrative    Not on file     Social Determinants of Health     Financial Resource Strain: Low Risk  (2024)    Overall Financial Resource Strain (CARDIA)     Difficulty of Paying Living Expenses: Not very hard   Food

## 2024-02-22 NOTE — PROGRESS NOTES
Verbal order per HIRO ALCOCER CNP for urine drug screen. Positive for BUP, THC. Verified results with VIRGINIA BARRETO LPN.

## 2024-03-21 ENCOUNTER — OFFICE VISIT (OUTPATIENT)
Dept: INTERNAL MEDICINE CLINIC | Age: 34
End: 2024-03-21
Payer: COMMERCIAL

## 2024-03-21 VITALS
SYSTOLIC BLOOD PRESSURE: 129 MMHG | HEIGHT: 70 IN | WEIGHT: 163 LBS | HEART RATE: 72 BPM | BODY MASS INDEX: 23.34 KG/M2 | RESPIRATION RATE: 16 BRPM | DIASTOLIC BLOOD PRESSURE: 64 MMHG

## 2024-03-21 DIAGNOSIS — F11.20 SEVERE OPIOID USE DISORDER (HCC): Primary | ICD-10-CM

## 2024-03-21 PROCEDURE — 80305 DRUG TEST PRSMV DIR OPT OBS: CPT | Performed by: NURSE PRACTITIONER

## 2024-03-21 PROCEDURE — G8427 DOCREV CUR MEDS BY ELIG CLIN: HCPCS | Performed by: NURSE PRACTITIONER

## 2024-03-21 PROCEDURE — 4004F PT TOBACCO SCREEN RCVD TLK: CPT | Performed by: NURSE PRACTITIONER

## 2024-03-21 PROCEDURE — G8484 FLU IMMUNIZE NO ADMIN: HCPCS | Performed by: NURSE PRACTITIONER

## 2024-03-21 PROCEDURE — 99214 OFFICE O/P EST MOD 30 MIN: CPT | Performed by: NURSE PRACTITIONER

## 2024-03-21 PROCEDURE — G8420 CALC BMI NORM PARAMETERS: HCPCS | Performed by: NURSE PRACTITIONER

## 2024-03-21 RX ORDER — BUPRENORPHINE AND NALOXONE 12; 3 MG/1; MG/1
1 FILM, SOLUBLE BUCCAL; SUBLINGUAL DAILY
Qty: 28 FILM | Refills: 0 | Status: SHIPPED | OUTPATIENT
Start: 2024-03-21 | End: 2024-04-18

## 2024-03-21 NOTE — PROGRESS NOTES
24   The patients primary care physician is Radha Tovar, DOMINIC - ALFRED    Jomar Morel is a 34 y.o.  male who presents in office today for follow up medication assisted treatment, substance use disorder.   He was a routine MAT pt of Dr Madison and transferred care to [facility]. Pt required late evening appts so I assmed care in 2023     Pt denies any urges, triggers, or cravings.     UDS acceptable    Labs reviewed with pt. No acute concerns    Pertinent Drug History  Patient has had problems using heroin/fentanyl, pain pills  Patient started using [id number] years ago  He said his brother had  of a heroin overdose  Patient uses it snorting  Other drugs used: THC  Suboxone programs in the past no  Vivitrol in the past no  Patient would typically use $40-$50 worth of heroin/fentanyl daily  Past Medical History:   Diagnosis Date    Opioid use disorder          Social History     Socioeconomic History    Marital status: Single     Spouse name: Not on file    Number of children: Not on file    Years of education: Not on file    Highest education level: Not on file   Occupational History    Not on file   Tobacco Use    Smoking status: Every Day     Current packs/day: 1.00     Types: Cigarettes    Smokeless tobacco: Never   Substance and Sexual Activity    Alcohol use: Not Currently     Comment: social/occasional    Drug use: Yes     Types: Marijuana (Weed)     Comment: last used fentanyl 20    Sexual activity: Yes     Partners: Female   Other Topics Concern    Not on file   Social History Narrative    Not on file     Social Determinants of Health     Financial Resource Strain: Low Risk  (2024)    Overall Financial Resource Strain (CARDIA)     Difficulty of Paying Living Expenses: Not very hard   Food Insecurity: No Food Insecurity (2024)    Hunger Vital Sign     Worried About Running Out of Food in the Last Year: Never true     Ran Out of Food in the Last Year: Never true

## 2024-04-18 ENCOUNTER — OFFICE VISIT (OUTPATIENT)
Dept: INTERNAL MEDICINE CLINIC | Age: 34
End: 2024-04-18
Payer: COMMERCIAL

## 2024-04-18 VITALS
RESPIRATION RATE: 16 BRPM | DIASTOLIC BLOOD PRESSURE: 58 MMHG | HEART RATE: 78 BPM | WEIGHT: 160 LBS | SYSTOLIC BLOOD PRESSURE: 129 MMHG | BODY MASS INDEX: 22.96 KG/M2

## 2024-04-18 DIAGNOSIS — Z79.899 ENCOUNTER FOR MONITORING SUBOXONE MAINTENANCE THERAPY: ICD-10-CM

## 2024-04-18 DIAGNOSIS — Z51.81 ENCOUNTER FOR MONITORING SUBOXONE MAINTENANCE THERAPY: ICD-10-CM

## 2024-04-18 DIAGNOSIS — F11.20 SEVERE OPIOID USE DISORDER (HCC): Primary | ICD-10-CM

## 2024-04-18 PROCEDURE — G8420 CALC BMI NORM PARAMETERS: HCPCS | Performed by: NURSE PRACTITIONER

## 2024-04-18 PROCEDURE — 80305 DRUG TEST PRSMV DIR OPT OBS: CPT | Performed by: NURSE PRACTITIONER

## 2024-04-18 PROCEDURE — 4004F PT TOBACCO SCREEN RCVD TLK: CPT | Performed by: NURSE PRACTITIONER

## 2024-04-18 PROCEDURE — G8427 DOCREV CUR MEDS BY ELIG CLIN: HCPCS | Performed by: NURSE PRACTITIONER

## 2024-04-18 PROCEDURE — 99213 OFFICE O/P EST LOW 20 MIN: CPT | Performed by: NURSE PRACTITIONER

## 2024-04-18 RX ORDER — BUPRENORPHINE AND NALOXONE 12; 3 MG/1; MG/1
1 FILM, SOLUBLE BUCCAL; SUBLINGUAL DAILY
Qty: 28 FILM | Refills: 0 | Status: SHIPPED | OUTPATIENT
Start: 2024-04-18 | End: 2024-05-16

## 2024-04-18 NOTE — PROGRESS NOTES
Verbal order per HIRO ALCOCER CNP for urine drug screen. Positive for BUP, THC.   
hard   Food Insecurity: No Food Insecurity (2/22/2024)    Hunger Vital Sign     Worried About Running Out of Food in the Last Year: Never true     Ran Out of Food in the Last Year: Never true   Transportation Needs: No Transportation Needs (2/22/2024)    PRAPARE - Transportation     Lack of Transportation (Medical): No     Lack of Transportation (Non-Medical): No   Physical Activity: Inactive (2/22/2024)    Exercise Vital Sign     Days of Exercise per Week: 0 days     Minutes of Exercise per Session: 0 min   Stress: No Stress Concern Present (2/22/2024)    [employer] - Occupational Stress Questionnaire     Feeling of Stress : Only a little   Social Connections: Socially Isolated (2/22/2024)    Social Connection and Isolation Panel [NHANES]     Frequency of Communication with Friends and Family: Once a week     Frequency of Social Gatherings with Friends and Family: Never     Attends Yarsanism Services: Never     Active Member of Clubs or Organizations: No     Attends Club or Organization Meetings: Never     Marital Status: Living with partner   Intimate Partner Violence: Not At Risk (2/22/2024)    Humiliation, Afraid, Rape, and Kick questionnaire     Fear of Current or Ex-Partner: No     Emotionally Abused: No     Physically Abused: No     Sexually Abused: No   Housing Stability: Unknown (2/22/2024)    Housing Stability Vital Sign     Unable to Pay for Housing in the Last Year: No     Number of Places Lived in the Last Year: Not on file     Unstable Housing in the Last Year: No         Current Outpatient Medications on File Prior to Visit   Medication Sig Dispense Refill    buprenorphine-naloxone (SUBOXONE) 12-3 MG sublingual film Place 1 Film under the tongue daily for 28 days. Max Daily Amount: 1 Film 28 Film 0    ibuprofen (ADVIL;MOTRIN) 200 MG tablet Take 1 tablet by mouth every 6 hours as needed for Pain       No current facility-administered medications on file prior to visit.

## 2024-05-16 ENCOUNTER — OFFICE VISIT (OUTPATIENT)
Dept: INTERNAL MEDICINE CLINIC | Age: 34
End: 2024-05-16
Payer: COMMERCIAL

## 2024-05-16 VITALS
BODY MASS INDEX: 22.33 KG/M2 | RESPIRATION RATE: 16 BRPM | DIASTOLIC BLOOD PRESSURE: 64 MMHG | HEART RATE: 86 BPM | HEIGHT: 70 IN | SYSTOLIC BLOOD PRESSURE: 131 MMHG | WEIGHT: 156 LBS

## 2024-05-16 DIAGNOSIS — F11.20 SEVERE OPIOID USE DISORDER (HCC): Primary | ICD-10-CM

## 2024-05-16 PROCEDURE — G8427 DOCREV CUR MEDS BY ELIG CLIN: HCPCS | Performed by: NURSE PRACTITIONER

## 2024-05-16 PROCEDURE — 99213 OFFICE O/P EST LOW 20 MIN: CPT | Performed by: NURSE PRACTITIONER

## 2024-05-16 PROCEDURE — 80305 DRUG TEST PRSMV DIR OPT OBS: CPT | Performed by: NURSE PRACTITIONER

## 2024-05-16 PROCEDURE — G8420 CALC BMI NORM PARAMETERS: HCPCS | Performed by: NURSE PRACTITIONER

## 2024-05-16 PROCEDURE — 4004F PT TOBACCO SCREEN RCVD TLK: CPT | Performed by: NURSE PRACTITIONER

## 2024-05-16 RX ORDER — BUPRENORPHINE AND NALOXONE 12; 3 MG/1; MG/1
1 FILM, SOLUBLE BUCCAL; SUBLINGUAL DAILY
Qty: 28 FILM | Refills: 0 | Status: SHIPPED | OUTPATIENT
Start: 2024-05-16 | End: 2024-06-13

## 2024-05-22 NOTE — PROGRESS NOTES
24   The patients primary care physician is Radha Tovar, DOMINIC - ALFRED    Jomar Morel is a 34 y.o.  male who presents in office today for follow up medication assisted treatment, substance use disorder.   He was a routine MAT pt of Dr Madison (est 2020)and transferred care to [facility]. Pt required late evening appts so I assmed care in 2023     Pt denies any urges, triggers, or cravings.     UDS acceptable    Denies any new concerns or additional needs      Pertinent Drug History  Patient has had problems using heroin/fentanyl, pain pills  Patient started using [id number] years ago  He said his brother had  of a heroin overdose  Patient uses it snorting  Other drugs used: THC  Suboxone programs in the past no  Vivitrol in the past no  Patient would typically use $40-$50 worth of heroin/fentanyl daily        Past Medical History:   Diagnosis Date    Opioid use disorder          Social History     Socioeconomic History    Marital status: Single     Spouse name: Not on file    Number of children: Not on file    Years of education: Not on file    Highest education level: Not on file   Occupational History    Not on file   Tobacco Use    Smoking status: Every Day     Current packs/day: 1.00     Types: Cigarettes    Smokeless tobacco: Never   Substance and Sexual Activity    Alcohol use: Not Currently     Comment: social/occasional    Drug use: Yes     Types: Marijuana (Weed)     Comment: last used fentanyl 20    Sexual activity: Yes     Partners: Female   Other Topics Concern    Not on file   Social History Narrative    Not on file     Social Determinants of Health     Financial Resource Strain: Low Risk  (2024)    Overall Financial Resource Strain (CARDIA)     Difficulty of Paying Living Expenses: Not very hard   Food Insecurity: No Food Insecurity (2024)    Hunger Vital Sign     Worried About Running Out of Food in the Last Year: Never true     Ran Out of Food in the Last

## 2024-06-13 ENCOUNTER — OFFICE VISIT (OUTPATIENT)
Dept: INTERNAL MEDICINE CLINIC | Age: 34
End: 2024-06-13
Payer: COMMERCIAL

## 2024-06-13 VITALS
DIASTOLIC BLOOD PRESSURE: 73 MMHG | HEIGHT: 70 IN | BODY MASS INDEX: 22.19 KG/M2 | WEIGHT: 155 LBS | SYSTOLIC BLOOD PRESSURE: 124 MMHG | HEART RATE: 64 BPM | RESPIRATION RATE: 16 BRPM

## 2024-06-13 DIAGNOSIS — F11.20 SEVERE OPIOID USE DISORDER (HCC): Primary | ICD-10-CM

## 2024-06-13 PROCEDURE — 80305 DRUG TEST PRSMV DIR OPT OBS: CPT | Performed by: NURSE PRACTITIONER

## 2024-06-13 PROCEDURE — G8427 DOCREV CUR MEDS BY ELIG CLIN: HCPCS | Performed by: NURSE PRACTITIONER

## 2024-06-13 PROCEDURE — 4004F PT TOBACCO SCREEN RCVD TLK: CPT | Performed by: NURSE PRACTITIONER

## 2024-06-13 PROCEDURE — 99214 OFFICE O/P EST MOD 30 MIN: CPT | Performed by: NURSE PRACTITIONER

## 2024-06-13 PROCEDURE — G8420 CALC BMI NORM PARAMETERS: HCPCS | Performed by: NURSE PRACTITIONER

## 2024-06-13 RX ORDER — BUPRENORPHINE AND NALOXONE 12; 3 MG/1; MG/1
1 FILM, SOLUBLE BUCCAL; SUBLINGUAL DAILY
Qty: 28 FILM | Refills: 0 | Status: SHIPPED | OUTPATIENT
Start: 2024-06-13 | End: 2024-07-11

## 2024-06-13 NOTE — PROGRESS NOTES
24   The patients primary care physician is Radha Tovar, DOMINIC - ALFRED    Jomar Morel is a 34 y.o.  male who presents in office today for follow up medication assisted treatment, substance use disorder.   He was a routine MAT pt of Dr Madison (est 2020)and transferred care to [facility]. Pt required late evening appts so I assmed care in 2023      Pt denies any urges, triggers, or cravings.      UDS acceptable     Denies any new concerns or additional needs        Pertinent Drug History  Patient has had problems using heroin/fentanyl, pain pills  Patient started using [id number] years ago  He said his brother had  of a heroin overdose  Patient uses it snorting  Other drugs used: THC  Suboxone programs in the past no  Vivitrol in the past no  Patient would typically use $40-$50 worth of heroin/fentanyl daily    Past Medical History:   Diagnosis Date    Opioid use disorder          Social History     Socioeconomic History    Marital status: Single     Spouse name: Not on file    Number of children: Not on file    Years of education: Not on file    Highest education level: Not on file   Occupational History    Not on file   Tobacco Use    Smoking status: Every Day     Current packs/day: 1.00     Types: Cigarettes    Smokeless tobacco: Never   Substance and Sexual Activity    Alcohol use: Not Currently     Comment: social/occasional    Drug use: Yes     Types: Marijuana (Weed)     Comment: last used fentanyl 20    Sexual activity: Yes     Partners: Female   Other Topics Concern    Not on file   Social History Narrative    Not on file     Social Determinants of Health     Financial Resource Strain: Low Risk  (2024)    Overall Financial Resource Strain (CARDIA)     Difficulty of Paying Living Expenses: Not very hard   Food Insecurity: No Food Insecurity (2024)    Hunger Vital Sign     Worried About Running Out of Food in the Last Year: Never true     Ran Out of Food in the Last

## 2024-07-11 ENCOUNTER — OFFICE VISIT (OUTPATIENT)
Dept: INTERNAL MEDICINE CLINIC | Age: 34
End: 2024-07-11
Payer: COMMERCIAL

## 2024-07-11 VITALS
HEART RATE: 71 BPM | BODY MASS INDEX: 21.81 KG/M2 | RESPIRATION RATE: 16 BRPM | SYSTOLIC BLOOD PRESSURE: 136 MMHG | WEIGHT: 152 LBS | DIASTOLIC BLOOD PRESSURE: 72 MMHG

## 2024-07-11 DIAGNOSIS — F11.20 SEVERE OPIOID USE DISORDER (HCC): Primary | ICD-10-CM

## 2024-07-11 LAB
ALCOHOL URINE: ABNORMAL
AMPHETAMINE SCREEN URINE: ABNORMAL
BARBITURATE SCREEN URINE: ABNORMAL
BENZODIAZEPINE SCREEN, URINE: ABNORMAL
BUPRENORPHINE URINE: ABNORMAL
COCAINE METABOLITE SCREEN URINE: ABNORMAL
FENTANYL SCREEN, URINE: ABNORMAL
GABAPENTIN SCREEN, URINE: ABNORMAL
MDMA URINE: ABNORMAL
METHADONE SCREEN, URINE: ABNORMAL
METHAMPHETAMINE, URINE: ABNORMAL
OPIATE SCREEN URINE: ABNORMAL
OXYCODONE SCREEN URINE: ABNORMAL
PHENCYCLIDINE SCREEN URINE: ABNORMAL
PROPOXYPHENE SCREEN, URINE: ABNORMAL
SYNTHETIC CANNABINOIDS(K2) SCREEN, URINE: ABNORMAL
THC SCREEN, URINE: ABNORMAL
TRAMADOL SCREEN URINE: ABNORMAL
TRICYCLIC ANTIDEPRESSANTS, UR: ABNORMAL

## 2024-07-11 PROCEDURE — 99213 OFFICE O/P EST LOW 20 MIN: CPT | Performed by: NURSE PRACTITIONER

## 2024-07-11 PROCEDURE — 80305 DRUG TEST PRSMV DIR OPT OBS: CPT | Performed by: NURSE PRACTITIONER

## 2024-07-11 RX ORDER — BUPRENORPHINE AND NALOXONE 12; 3 MG/1; MG/1
1 FILM, SOLUBLE BUCCAL; SUBLINGUAL DAILY
Qty: 28 FILM | Refills: 0 | Status: SHIPPED | OUTPATIENT
Start: 2024-07-11 | End: 2024-08-08

## 2024-07-11 NOTE — PROGRESS NOTES
Verbal order per HIRO ALCOCER CNP for urine drug screen. Positive for BUP, THC.   
Never true   Transportation Needs: No Transportation Needs (2/22/2024)    PRAPARE - Transportation     Lack of Transportation (Medical): No     Lack of Transportation (Non-Medical): No   Physical Activity: Inactive (2/22/2024)    Exercise Vital Sign     Days of Exercise per Week: 0 days     Minutes of Exercise per Session: 0 min   Stress: No Stress Concern Present (2/22/2024)    [employer] - Occupational Stress Questionnaire     Feeling of Stress : Only a little   Social Connections: Socially Isolated (2/22/2024)    Social Connection and Isolation Panel [NHANES]     Frequency of Communication with Friends and Family: Once a week     Frequency of Social Gatherings with Friends and Family: Never     Attends Taoism Services: Never     Active Member of Clubs or Organizations: No     Attends Club or Organization Meetings: Never     Marital Status: Living with partner   Intimate Partner Violence: Not At Risk (2/22/2024)    Humiliation, Afraid, Rape, and Kick questionnaire     Fear of Current or Ex-Partner: No     Emotionally Abused: No     Physically Abused: No     Sexually Abused: No   Housing Stability: Unknown (2/22/2024)    Housing Stability Vital Sign     Unable to Pay for Housing in the Last Year: No     Number of Places Lived in the Last Year: Not on file     Unstable Housing in the Last Year: No         Current Outpatient Medications on File Prior to Visit   Medication Sig Dispense Refill    buprenorphine-naloxone (SUBOXONE) 12-3 MG sublingual film Place 1 Film under the tongue daily for 28 days. Max Daily Amount: 1 Film 28 Film 0    ibuprofen (ADVIL;MOTRIN) 200 MG tablet Take 1 tablet by mouth every 6 hours as needed for Pain       No current facility-administered medications on file prior to visit.         Review of Systems      Physical Exam    Vitals:    07/11/24 1644   BP: 136/72   Pulse: 71   Resp: 16        Cognition: alert, oriented to person, place, and time  Appearance:

## 2024-08-08 ENCOUNTER — OFFICE VISIT (OUTPATIENT)
Dept: INTERNAL MEDICINE CLINIC | Age: 34
End: 2024-08-08

## 2024-08-08 VITALS
WEIGHT: 156 LBS | BODY MASS INDEX: 22.38 KG/M2 | DIASTOLIC BLOOD PRESSURE: 64 MMHG | SYSTOLIC BLOOD PRESSURE: 117 MMHG | RESPIRATION RATE: 16 BRPM | HEART RATE: 59 BPM

## 2024-08-08 DIAGNOSIS — F11.20 SEVERE OPIOID USE DISORDER (HCC): Primary | ICD-10-CM

## 2024-08-08 LAB
ALCOHOL URINE: ABNORMAL
AMPHETAMINE SCREEN URINE: ABNORMAL
BARBITURATE SCREEN URINE: ABNORMAL
BENZODIAZEPINE SCREEN, URINE: ABNORMAL
BUPRENORPHINE URINE: ABNORMAL
COCAINE METABOLITE SCREEN URINE: ABNORMAL
FENTANYL SCREEN, URINE: ABNORMAL
GABAPENTIN SCREEN, URINE: ABNORMAL
MDMA, URINE: ABNORMAL
METHADONE SCREEN, URINE: ABNORMAL
METHAMPHETAMINE, URINE: ABNORMAL
OPIATE SCREEN URINE: ABNORMAL
OXYCODONE SCREEN URINE: ABNORMAL
PHENCYCLIDINE SCREEN URINE: ABNORMAL
PROPOXYPHENE SCREEN, URINE: ABNORMAL
SYNTHETIC CANNABINOIDS(K2) SCREEN, URINE: ABNORMAL
THC SCREEN, URINE: ABNORMAL
TRAMADOL SCREEN URINE: ABNORMAL
TRICYCLIC ANTIDEPRESSANTS, UR: ABNORMAL

## 2024-08-08 RX ORDER — BUPRENORPHINE AND NALOXONE 12; 3 MG/1; MG/1
1 FILM, SOLUBLE BUCCAL; SUBLINGUAL DAILY
Qty: 28 FILM | Refills: 0 | Status: SHIPPED | OUTPATIENT
Start: 2024-08-08 | End: 2024-09-05

## 2024-08-08 NOTE — PROGRESS NOTES
24   The patients primary care physician is Radha Tovar, DOMINIC - ALFRED    Jomar Morel is a 34 y.o.  male who presents in office today for follow up medication assisted treatment, substance use disorder.   He was a routine MAT pt of Dr Madison (est 2020)and transferred care to [facility]. Pt required late evening appts so I assmed care in 2023      Pt denies any urges, triggers, or cravings.      UDS acceptable     Voices concerns regarding his weight. States he is eating several meals daily, adding protein and trying to gain but having difficulty. BMI is within healthy range however pt states he does not want to be this thin. He has been trying to gain for several months. He denies any reports of abd pain, n/v,diarrhea. No physical complaints.   Labs were last completed in Feb with no acute concerns. Platelets low however this appears to be consistent since .  He has gained 4lbs since visit last month.  Will complete routine at next visit.   Discussed weight possibly due to suboxone. Pt reports stable on this medication x 4 years and weighing less than when he was actively using.   Education on benefits of injectable buprenorphine- pamphlets given. Pt open to this, will review material and notify provider.       Pertinent Drug History  Patient has had problems using heroin/fentanyl, pain pills  Patient started using [id number] years ago  He said his brother had  of a heroin overdose  Patient uses it snorting  Other drugs used: THC  Suboxone programs in the past no  Vivitrol in the past no  Patient would typically use $40-$50 worth of heroin/fentanyl daily         Past Medical History:   Diagnosis Date    Opioid use disorder          Social History     Socioeconomic History    Marital status: Single     Spouse name: Not on file    Number of children: Not on file    Years of education: Not on file    Highest education level: Not on file   Occupational History    Not on file   Tobacco

## 2024-08-23 ENCOUNTER — TELEPHONE (OUTPATIENT)
Dept: INTERNAL MEDICINE CLINIC | Age: 34
End: 2024-08-23

## 2024-09-05 DIAGNOSIS — F11.20 SEVERE OPIOID USE DISORDER (HCC): ICD-10-CM

## 2024-09-05 RX ORDER — BUPRENORPHINE AND NALOXONE 12; 3 MG/1; MG/1
1 FILM, SOLUBLE BUCCAL; SUBLINGUAL DAILY
Qty: 4 FILM | Refills: 0 | Status: SHIPPED | OUTPATIENT
Start: 2024-09-05 | End: 2024-09-09

## 2024-09-05 NOTE — TELEPHONE ENCOUNTER
The patient called and said he missed his appt. Script is out today. Yuni Austin, CNP okay's meds until next his appt.

## 2024-09-09 ENCOUNTER — NURSE ONLY (OUTPATIENT)
Dept: INTERNAL MEDICINE CLINIC | Age: 34
End: 2024-09-09
Payer: COMMERCIAL

## 2024-09-09 VITALS
HEIGHT: 70 IN | WEIGHT: 156 LBS | RESPIRATION RATE: 14 BRPM | DIASTOLIC BLOOD PRESSURE: 60 MMHG | HEART RATE: 86 BPM | SYSTOLIC BLOOD PRESSURE: 121 MMHG | BODY MASS INDEX: 22.33 KG/M2

## 2024-09-09 DIAGNOSIS — F11.20 SEVERE OPIOID USE DISORDER (HCC): ICD-10-CM

## 2024-09-09 LAB
ALCOHOL URINE: NORMAL
AMPHETAMINE SCREEN URINE: NORMAL
BARBITURATE SCREEN URINE: NORMAL
BENZODIAZEPINE SCREEN, URINE: NORMAL
BUPRENORPHINE URINE: NORMAL
COCAINE METABOLITE SCREEN URINE: NORMAL
FENTANYL SCREEN, URINE: NORMAL
GABAPENTIN SCREEN, URINE: NORMAL
MDMA, URINE: NORMAL
METHADONE SCREEN, URINE: NORMAL
METHAMPHETAMINE, URINE: NORMAL
OPIATE SCREEN URINE: NORMAL
OXYCODONE SCREEN URINE: NORMAL
PHENCYCLIDINE SCREEN URINE: NORMAL
PROPOXYPHENE SCREEN, URINE: NORMAL
SYNTHETIC CANNABINOIDS(K2) SCREEN, URINE: NORMAL
THC SCREEN, URINE: NORMAL
TRAMADOL SCREEN URINE: NORMAL
TRICYCLIC ANTIDEPRESSANTS, UR: NORMAL

## 2024-09-09 PROCEDURE — 80305 DRUG TEST PRSMV DIR OPT OBS: CPT | Performed by: NURSE PRACTITIONER

## 2024-09-09 RX ORDER — BUPRENORPHINE AND NALOXONE 12; 3 MG/1; MG/1
1 FILM, SOLUBLE BUCCAL; SUBLINGUAL DAILY
Qty: 28 FILM | Refills: 0 | Status: SHIPPED | OUTPATIENT
Start: 2024-09-09 | End: 2024-10-07

## 2024-10-07 ENCOUNTER — OFFICE VISIT (OUTPATIENT)
Dept: INTERNAL MEDICINE CLINIC | Age: 34
End: 2024-10-07

## 2024-10-07 VITALS
BODY MASS INDEX: 22.38 KG/M2 | DIASTOLIC BLOOD PRESSURE: 64 MMHG | HEART RATE: 79 BPM | RESPIRATION RATE: 16 BRPM | SYSTOLIC BLOOD PRESSURE: 133 MMHG | WEIGHT: 156 LBS

## 2024-10-07 DIAGNOSIS — R53.83 OTHER FATIGUE: ICD-10-CM

## 2024-10-07 DIAGNOSIS — F11.20 SEVERE OPIOID USE DISORDER (HCC): Primary | ICD-10-CM

## 2024-10-07 PROCEDURE — 99213 OFFICE O/P EST LOW 20 MIN: CPT | Performed by: NURSE PRACTITIONER

## 2024-10-07 PROCEDURE — 80305 DRUG TEST PRSMV DIR OPT OBS: CPT | Performed by: NURSE PRACTITIONER

## 2024-10-07 RX ORDER — BUPRENORPHINE AND NALOXONE 12; 3 MG/1; MG/1
1 FILM, SOLUBLE BUCCAL; SUBLINGUAL DAILY
Qty: 28 FILM | Refills: 0 | Status: SHIPPED | OUTPATIENT
Start: 2024-10-07 | End: 2024-11-04

## 2024-10-07 NOTE — PROGRESS NOTES
10/07/24   The patients primary care physician is Radha Tovar, DOMINIC - ALFRED    Jomar Morel is a 34 y.o.  male who presents in office today for follow up medication assisted treatment, substance use disorder.   He was a routine MAT pt of Dr Madison (est 2020)and transferred care to [facility]. Pt required late evening appts so I assmed care in 2023     Pt denies any urges, triggers, or cravings.     UDS acceptable    Pt requested to transition to subcutaneous buprenorphine however this is excluded from his policy. He is paying 90.00 a month out of pocket for monthly prescription through [email or web address].     Weight has remained stable. Pt is wanting to gain weight.   Labs planned for next visit.     Pertinent Drug History  Patient has had problems using heroin/fentanyl, pain pills  Patient started using [id number] years ago  He said his brother had  of a heroin overdose  Patient uses it snorting  Other drugs used: THC  Suboxone programs in the past no  Vivitrol in the past no  Patient would typically use $40-$50 worth of heroin/fentanyl daily  Past Medical History:   Diagnosis Date    Opioid use disorder          Social History     Socioeconomic History    Marital status: Single     Spouse name: Not on file    Number of children: Not on file    Years of education: Not on file    Highest education level: Not on file   Occupational History    Not on file   Tobacco Use    Smoking status: Every Day     Current packs/day: 1.00     Types: Cigarettes    Smokeless tobacco: Never   Substance and Sexual Activity    Alcohol use: Not Currently     Comment: social/occasional    Drug use: Yes     Types: Marijuana (Weed)     Comment: last used fentanyl 20    Sexual activity: Yes     Partners: Female   Other Topics Concern    Not on file   Social History Narrative    Not on file     Social Determinants of Health     Financial Resource Strain: Low Risk  (2024)    Overall Financial Resource Strain ([facility])

## 2024-10-15 ENCOUNTER — TELEPHONE (OUTPATIENT)
Dept: INTERNAL MEDICINE CLINIC | Age: 34
End: 2024-10-15

## 2024-10-15 NOTE — TELEPHONE ENCOUNTER
----- Message from DOMINIC Lawton CNP sent at 10/7/2024  4:43 PM EDT -----  Did we hear back from his medical to see if they will cover injectable.

## 2024-11-04 ENCOUNTER — OFFICE VISIT (OUTPATIENT)
Dept: INTERNAL MEDICINE CLINIC | Age: 34
End: 2024-11-04

## 2024-11-04 ENCOUNTER — LAB (OUTPATIENT)
Dept: LAB | Age: 34
End: 2024-11-04

## 2024-11-04 VITALS
HEART RATE: 68 BPM | BODY MASS INDEX: 22.19 KG/M2 | SYSTOLIC BLOOD PRESSURE: 119 MMHG | WEIGHT: 155 LBS | RESPIRATION RATE: 16 BRPM | DIASTOLIC BLOOD PRESSURE: 56 MMHG | HEIGHT: 70 IN

## 2024-11-04 DIAGNOSIS — R53.83 OTHER FATIGUE: ICD-10-CM

## 2024-11-04 DIAGNOSIS — F11.20 SEVERE OPIOID USE DISORDER (HCC): Primary | ICD-10-CM

## 2024-11-04 DIAGNOSIS — F11.20 SEVERE OPIOID USE DISORDER (HCC): ICD-10-CM

## 2024-11-04 LAB
25(OH)D3 SERPL-MCNC: 30 NG/ML (ref 30–100)
ALBUMIN SERPL BCG-MCNC: 4.4 G/DL (ref 3.5–5.1)
ALCOHOL URINE: ABNORMAL
ALP SERPL-CCNC: 58 U/L (ref 38–126)
ALT SERPL W/O P-5'-P-CCNC: 13 U/L (ref 11–66)
AMPHETAMINE SCREEN URINE: ABNORMAL
ANION GAP SERPL CALC-SCNC: 9 MEQ/L (ref 8–16)
AST SERPL-CCNC: 18 U/L (ref 5–40)
BARBITURATE SCREEN URINE: ABNORMAL
BASOPHILS ABSOLUTE: 0 THOU/MM3 (ref 0–0.1)
BASOPHILS NFR BLD AUTO: 0.7 %
BENZODIAZEPINE SCREEN, URINE: ABNORMAL
BILIRUB SERPL-MCNC: 0.2 MG/DL (ref 0.3–1.2)
BUN SERPL-MCNC: 16 MG/DL (ref 7–22)
BUPRENORPHINE URINE: ABNORMAL
CALCIUM SERPL-MCNC: 9.5 MG/DL (ref 8.5–10.5)
CHLORIDE SERPL-SCNC: 102 MEQ/L (ref 98–111)
CO2 SERPL-SCNC: 30 MEQ/L (ref 23–33)
COCAINE METABOLITE SCREEN URINE: ABNORMAL
CREAT SERPL-MCNC: 1 MG/DL (ref 0.4–1.2)
DEPRECATED RDW RBC AUTO: 42.2 FL (ref 35–45)
EOSINOPHIL NFR BLD AUTO: 2 %
EOSINOPHILS ABSOLUTE: 0.1 THOU/MM3 (ref 0–0.4)
ERYTHROCYTE [DISTWIDTH] IN BLOOD BY AUTOMATED COUNT: 12.5 % (ref 11.5–14.5)
FENTANYL SCREEN, URINE: ABNORMAL
FOLATE SERPL-MCNC: 9.6 NG/ML (ref 4.8–24.2)
GABAPENTIN SCREEN, URINE: ABNORMAL
GFR SERPL CREATININE-BSD FRML MDRD: > 90 ML/MIN/1.73M2
GLUCOSE SERPL-MCNC: 69 MG/DL (ref 70–108)
HCT VFR BLD AUTO: 40.7 % (ref 42–52)
HGB BLD-MCNC: 13.3 GM/DL (ref 14–18)
IMM GRANULOCYTES # BLD AUTO: 0.02 THOU/MM3 (ref 0–0.07)
IMM GRANULOCYTES NFR BLD AUTO: 0.4 %
LYMPHOCYTES ABSOLUTE: 2.2 THOU/MM3 (ref 1–4.8)
LYMPHOCYTES NFR BLD AUTO: 40.8 %
MCH RBC QN AUTO: 30.1 PG (ref 26–33)
MCHC RBC AUTO-ENTMCNC: 32.7 GM/DL (ref 32.2–35.5)
MCV RBC AUTO: 92.1 FL (ref 80–94)
MDMA, URINE: ABNORMAL
METHADONE SCREEN, URINE: ABNORMAL
METHAMPHETAMINE, URINE: ABNORMAL
MONOCYTES ABSOLUTE: 0.4 THOU/MM3 (ref 0.4–1.3)
MONOCYTES NFR BLD AUTO: 6.4 %
NEUTROPHILS ABSOLUTE: 2.7 THOU/MM3 (ref 1.8–7.7)
NEUTROPHILS NFR BLD AUTO: 49.7 %
NRBC BLD AUTO-RTO: 0 /100 WBC
OPIATE SCREEN URINE: ABNORMAL
OXYCODONE SCREEN URINE: ABNORMAL
PHENCYCLIDINE SCREEN URINE: ABNORMAL
PLATELET # BLD AUTO: 106 THOU/MM3 (ref 130–400)
PMV BLD AUTO: 13.3 FL (ref 9.4–12.4)
POTASSIUM SERPL-SCNC: 5.3 MEQ/L (ref 3.5–5.2)
PROPOXYPHENE SCREEN, URINE: ABNORMAL
PROT SERPL-MCNC: 7.4 G/DL (ref 6.1–8)
RBC # BLD AUTO: 4.42 MILL/MM3 (ref 4.7–6.1)
SODIUM SERPL-SCNC: 141 MEQ/L (ref 135–145)
SYNTHETIC CANNABINOIDS(K2) SCREEN, URINE: ABNORMAL
THC SCREEN, URINE: ABNORMAL
TRAMADOL SCREEN URINE: ABNORMAL
TRICYCLIC ANTIDEPRESSANTS, UR: ABNORMAL
VIT B12 SERPL-MCNC: 527 PG/ML (ref 211–911)
WBC # BLD AUTO: 5.5 THOU/MM3 (ref 4.8–10.8)

## 2024-11-04 RX ORDER — BUPRENORPHINE AND NALOXONE 12; 3 MG/1; MG/1
1 FILM, SOLUBLE BUCCAL; SUBLINGUAL DAILY
Qty: 28 FILM | Refills: 0 | Status: SHIPPED | OUTPATIENT
Start: 2024-11-04 | End: 2024-12-02

## 2024-11-04 NOTE — PROGRESS NOTES
Verbal order per HIRO ALCOCER CNP for urine drug screen. Positive for BIUP,THC . Witness Corrie GOOD RN  
Year: Never true     Ran Out of Food in the Last Year: Never true   Transportation Needs: No Transportation Needs (2/22/2024)    PRAPARE - Transportation     Lack of Transportation (Medical): No     Lack of Transportation (Non-Medical): No   Physical Activity: Inactive (2/22/2024)    Exercise Vital Sign     Days of Exercise per Week: 0 days     Minutes of Exercise per Session: 0 min   Stress: No Stress Concern Present (2/22/2024)    [employer] - Occupational Stress Questionnaire     Feeling of Stress : Only a little   Social Connections: Socially Isolated (2/22/2024)    Social Connection and Isolation Panel [NHANES]     Frequency of Communication with Friends and Family: Once a week     Frequency of Social Gatherings with Friends and Family: Never     Attends Congregation Services: Never     Active Member of Clubs or Organizations: No     Attends Club or Organization Meetings: Never     Marital Status: Living with partner   Intimate Partner Violence: Not At Risk (2/22/2024)    Humiliation, Afraid, Rape, and Kick questionnaire     Fear of Current or Ex-Partner: No     Emotionally Abused: No     Physically Abused: No     Sexually Abused: No   Housing Stability: Unknown (2/22/2024)    Housing Stability Vital Sign     Unable to Pay for Housing in the Last Year: No     Number of Places Lived in the Last Year: Not on file     Unstable Housing in the Last Year: No         Current Outpatient Medications on File Prior to Visit   Medication Sig Dispense Refill    buprenorphine-naloxone (SUBOXONE) 12-3 MG sublingual film Place 1 Film under the tongue daily for 28 days. Max Daily Amount: 1 Film 28 Film 0    ibuprofen (ADVIL;MOTRIN) 200 MG tablet Take 1 tablet by mouth every 6 hours as needed for Pain       No current facility-administered medications on file prior to visit.           Cognition: alert, oriented to person, place, and time  Appearance: appropriate, no acute distress, does not appear

## 2024-11-11 ENCOUNTER — LAB (OUTPATIENT)
Dept: LAB | Age: 34
End: 2024-11-11

## 2024-11-11 DIAGNOSIS — D69.6 THROMBOCYTOPENIA (HCC): ICD-10-CM

## 2024-11-11 DIAGNOSIS — E87.5 HYPERKALEMIA: Primary | ICD-10-CM

## 2024-11-11 DIAGNOSIS — E87.5 HYPERKALEMIA: ICD-10-CM

## 2024-11-11 LAB
FIBRINOGEN PPP-MCNC: 209 MG/100ML (ref 155–475)
POTASSIUM SERPL-SCNC: 4.4 MEQ/L (ref 3.5–5.2)

## 2024-11-12 LAB — INR PPP: 1.36 (ref 0.85–1.13)

## 2024-11-15 ENCOUNTER — HOSPITAL ENCOUNTER (OUTPATIENT)
Dept: ULTRASOUND IMAGING | Age: 34
Discharge: HOME | End: 2024-11-15
Payer: COMMERCIAL

## 2024-11-15 DIAGNOSIS — D69.6 THROMBOCYTOPENIA (HCC): ICD-10-CM

## 2024-11-15 PROCEDURE — 76705 ECHO EXAM OF ABDOMEN: CPT

## 2024-12-02 ENCOUNTER — NURSE ONLY (OUTPATIENT)
Dept: INTERNAL MEDICINE CLINIC | Age: 34
End: 2024-12-02

## 2024-12-02 VITALS
RESPIRATION RATE: 16 BRPM | DIASTOLIC BLOOD PRESSURE: 61 MMHG | BODY MASS INDEX: 23.24 KG/M2 | SYSTOLIC BLOOD PRESSURE: 134 MMHG | HEART RATE: 72 BPM | WEIGHT: 162 LBS

## 2024-12-02 DIAGNOSIS — F11.20 SEVERE OPIOID USE DISORDER (HCC): Primary | ICD-10-CM

## 2024-12-02 RX ORDER — BUPRENORPHINE AND NALOXONE 12; 3 MG/1; MG/1
1 FILM, SOLUBLE BUCCAL; SUBLINGUAL DAILY
Qty: 28 FILM | Refills: 0 | OUTPATIENT
Start: 2024-12-02 | End: 2024-12-30

## 2024-12-30 ENCOUNTER — OFFICE VISIT (OUTPATIENT)
Dept: INTERNAL MEDICINE CLINIC | Age: 34
End: 2024-12-30
Payer: COMMERCIAL

## 2024-12-30 VITALS
SYSTOLIC BLOOD PRESSURE: 130 MMHG | WEIGHT: 157 LBS | DIASTOLIC BLOOD PRESSURE: 62 MMHG | HEART RATE: 62 BPM | RESPIRATION RATE: 16 BRPM | BODY MASS INDEX: 22.53 KG/M2

## 2024-12-30 DIAGNOSIS — F11.20 SEVERE OPIOID USE DISORDER (HCC): Primary | ICD-10-CM

## 2024-12-30 PROCEDURE — 99214 OFFICE O/P EST MOD 30 MIN: CPT | Performed by: NURSE PRACTITIONER

## 2024-12-30 PROCEDURE — 80305 DRUG TEST PRSMV DIR OPT OBS: CPT | Performed by: NURSE PRACTITIONER

## 2024-12-30 RX ORDER — BUPRENORPHINE AND NALOXONE 12; 3 MG/1; MG/1
1 FILM, SOLUBLE BUCCAL; SUBLINGUAL DAILY
Qty: 28 FILM | Refills: 0 | Status: SHIPPED | OUTPATIENT
Start: 2024-12-30 | End: 2025-01-27

## 2024-12-30 NOTE — PROGRESS NOTES
24   The patients primary care physician is Radha Tovar, DOMINIC - ALFRED    Jomar Morel is a 34 y.o.  male who presents in office today for follow up medication assisted treatment, substance use disorder.   He was a routine MAT pt of Dr Madison (est 2020)and transferred care to [facility]. Pt required late evening appts so I assmed care in 2023     Pt denies any urges, triggers, or cravings.     UDS acceptable    Labs and US completed, no acute concerns.   Recommended OTC MV with iron supplement    Pt states he has been feeling well, symptoms improved, no additional concerns voiced      Pertinent Drug History  Patient has had problems using heroin/fentanyl, pain pills  Patient started using [id number] years ago  He said his brother had  of a heroin overdose  Patient uses it snorting  Other drugs used: THC  Suboxone programs in the past no  Vivitrol in the past no  Patient would typically use $40-$50 worth of heroin/fentanyl daily  Past Medical History:   Diagnosis Date    Opioid use disorder          Social History     Socioeconomic History    Marital status: Single     Spouse name: Not on file    Number of children: Not on file    Years of education: Not on file    Highest education level: Not on file   Occupational History    Not on file   Tobacco Use    Smoking status: Every Day     Current packs/day: 1.00     Types: Cigarettes    Smokeless tobacco: Never   Substance and Sexual Activity    Alcohol use: Not Currently     Comment: social/occasional    Drug use: Yes     Types: Marijuana (Weed)     Comment: last used fentanyl 20    Sexual activity: Yes     Partners: Female   Other Topics Concern    Not on file   Social History Narrative    Not on file     Social Determinants of Health     Financial Resource Strain: Low Risk  (2024)    Overall Financial Resource Strain (CARDIA)     Difficulty of Paying Living Expenses: Not very hard   Food Insecurity: No Food Insecurity (2024)

## 2024-12-30 NOTE — PROGRESS NOTES
Verbal order per HIRO ALCOCER CNP for urine drug screen. Positive for BUP, THC. Verified results with JULITO GOOD RN.

## 2025-01-27 ENCOUNTER — OFFICE VISIT (OUTPATIENT)
Dept: INTERNAL MEDICINE CLINIC | Age: 35
End: 2025-01-27

## 2025-01-27 VITALS
HEART RATE: 63 BPM | SYSTOLIC BLOOD PRESSURE: 132 MMHG | DIASTOLIC BLOOD PRESSURE: 60 MMHG | WEIGHT: 157 LBS | RESPIRATION RATE: 16 BRPM | BODY MASS INDEX: 22.53 KG/M2

## 2025-01-27 DIAGNOSIS — F11.20 SEVERE OPIOID USE DISORDER (HCC): Primary | ICD-10-CM

## 2025-01-27 ASSESSMENT — PATIENT HEALTH QUESTIONNAIRE - PHQ9
5. POOR APPETITE OR OVEREATING: NOT AT ALL
3. TROUBLE FALLING OR STAYING ASLEEP: NOT AT ALL
6. FEELING BAD ABOUT YOURSELF - OR THAT YOU ARE A FAILURE OR HAVE LET YOURSELF OR YOUR FAMILY DOWN: SEVERAL DAYS
SUM OF ALL RESPONSES TO PHQ QUESTIONS 1-9: 3
1. LITTLE INTEREST OR PLEASURE IN DOING THINGS: NOT AT ALL
SUM OF ALL RESPONSES TO PHQ QUESTIONS 1-9: 3
2. FEELING DOWN, DEPRESSED OR HOPELESS: SEVERAL DAYS
10. IF YOU CHECKED OFF ANY PROBLEMS, HOW DIFFICULT HAVE THESE PROBLEMS MADE IT FOR YOU TO DO YOUR WORK, TAKE CARE OF THINGS AT HOME, OR GET ALONG WITH OTHER PEOPLE: NOT DIFFICULT AT ALL
8. MOVING OR SPEAKING SO SLOWLY THAT OTHER PEOPLE COULD HAVE NOTICED. OR THE OPPOSITE, BEING SO FIGETY OR RESTLESS THAT YOU HAVE BEEN MOVING AROUND A LOT MORE THAN USUAL: NOT AT ALL
9. THOUGHTS THAT YOU WOULD BE BETTER OFF DEAD, OR OF HURTING YOURSELF: NOT AT ALL
4. FEELING TIRED OR HAVING LITTLE ENERGY: SEVERAL DAYS
SUM OF ALL RESPONSES TO PHQ QUESTIONS 1-9: 3
7. TROUBLE CONCENTRATING ON THINGS, SUCH AS READING THE NEWSPAPER OR WATCHING TELEVISION: NOT AT ALL
SUM OF ALL RESPONSES TO PHQ9 QUESTIONS 1 & 2: 1
SUM OF ALL RESPONSES TO PHQ QUESTIONS 1-9: 3

## 2025-01-27 NOTE — PROGRESS NOTES
Verbal order per HIRO ALCOCER CNP for urine drug screen. Positive for BUP,THC. Patient here for a nurse visit medication will be sent to pharmacy.

## 2025-01-27 NOTE — PROGRESS NOTES
Patient updated PHQ-9 depression  Score: 3  Patient identifies the following symptoms, feeling down/ depressed, feeling tired/ having little energy,feeling bad about himself / letting himself or others down  Provider updated.  Paper copy scanned into chart.

## 2025-01-28 RX ORDER — BUPRENORPHINE AND NALOXONE 12; 3 MG/1; MG/1
1 FILM, SOLUBLE BUCCAL; SUBLINGUAL DAILY
Qty: 27 FILM | Refills: 0 | OUTPATIENT
Start: 2025-01-28 | End: 2025-02-24

## 2025-01-29 NOTE — PROGRESS NOTES
25   The patients primary care physician is Radha Tovar, DOMINIC - ALFRED    Jomar Morel is a 34 y.o.  male who presents in office today for follow up medication assisted treatment, substance use disorder.   Pt was a routine MAT pt of Dr Madison (est 2020)and transferred care to [facility]. Pt required late evening appts so I assmed care in 2023      Pt denies any urges, triggers, or cravings.      UDS acceptable     Pt states he has been feeling well, no additional concerns voiced        Pertinent Drug History  Patient has had problems using heroin/fentanyl, pain pills  Patient started using [id number] years ago  He said his brother had  of a heroin overdose  Patient uses it snorting  Other drugs used: THC  Suboxone programs in the past no  Vivitrol in the past no  Patient would typically use $40-$50 worth of heroin/fentanyl daily      Past Medical History:   Diagnosis Date    Opioid use disorder          Social History     Socioeconomic History    Marital status: Single     Spouse name: Not on file    Number of children: Not on file    Years of education: Not on file    Highest education level: Not on file   Occupational History    Not on file   Tobacco Use    Smoking status: Every Day     Current packs/day: 1.00     Types: Cigarettes    Smokeless tobacco: Never   Substance and Sexual Activity    Alcohol use: Not Currently     Comment: social/occasional    Drug use: Yes     Types: Marijuana (Weed)     Comment: last used fentanyl 20    Sexual activity: Yes     Partners: Female   Other Topics Concern    Not on file   Social History Narrative    Not on file     Social Determinants of Health     Financial Resource Strain: Low Risk  (2024)    Overall Financial Resource Strain (CARDIA)     Difficulty of Paying Living Expenses: Not very hard   Food Insecurity: No Food Insecurity (2024)    Hunger Vital Sign     Worried About Running Out of Food in the Last Year: Never true     Ran

## 2025-02-24 ENCOUNTER — OFFICE VISIT (OUTPATIENT)
Dept: INTERNAL MEDICINE CLINIC | Age: 35
End: 2025-02-24

## 2025-02-24 VITALS
BODY MASS INDEX: 22.38 KG/M2 | DIASTOLIC BLOOD PRESSURE: 62 MMHG | WEIGHT: 156 LBS | RESPIRATION RATE: 16 BRPM | SYSTOLIC BLOOD PRESSURE: 132 MMHG | HEART RATE: 76 BPM

## 2025-02-24 DIAGNOSIS — F11.20 SEVERE OPIOID USE DISORDER (HCC): Primary | ICD-10-CM

## 2025-02-24 RX ORDER — BUPRENORPHINE AND NALOXONE 12; 3 MG/1; MG/1
1 FILM, SOLUBLE BUCCAL; SUBLINGUAL DAILY
Qty: 28 FILM | Refills: 0 | Status: SHIPPED | OUTPATIENT
Start: 2025-02-24 | End: 2025-03-24

## 2025-02-24 NOTE — PROGRESS NOTES
25   The patients primary care physician is Radha Tovar, DOMINIC - ALFRED    Jomar Morel is a 35 y.o.  male who presents in office today for follow up medication assisted treatment, substance use disorder.   Pt was a routine MAT pt of Dr Madison (est 2020)and transferred care to [facility]. Pt required late evening appts so I assmed care in 2023      Pt denies any urges, triggers, or cravings.     UDS acceptable    No new concerns or additional needs voiced      Pertinent Drug History  Patient has had problems using heroin/fentanyl, pain pills  Patient started using [id number] years ago  He said his brother had  of a heroin overdose  Patient uses it snorting  Other drugs used: THC  Suboxone programs in the past no  Vivitrol in the past no  Patient would typically use $40-$50 worth of heroin/fentanyl daily     Past Medical History:   Diagnosis Date    Opioid use disorder          Social History     Socioeconomic History    Marital status: Single     Spouse name: Not on file    Number of children: Not on file    Years of education: Not on file    Highest education level: Not on file   Occupational History    Not on file   Tobacco Use    Smoking status: Every Day     Current packs/day: 1.00     Types: Cigarettes    Smokeless tobacco: Never   Substance and Sexual Activity    Alcohol use: Not Currently     Comment: social/occasional    Drug use: Yes     Types: Marijuana (Weed)     Comment: last used fentanyl 20    Sexual activity: Yes     Partners: Female   Other Topics Concern    Not on file   Social History Narrative    Not on file     Social Determinants of Health     Financial Resource Strain: Low Risk  (2024)    Overall Financial Resource Strain (CARDIA)     Difficulty of Paying Living Expenses: Not very hard   Food Insecurity: No Food Insecurity (2024)    Hunger Vital Sign     Worried About Running Out of Food in the Last Year: Never true     Ran Out of Food in the Last Year:

## 2025-02-25 ENCOUNTER — TELEPHONE (OUTPATIENT)
Dept: INTERNAL MEDICINE CLINIC | Age: 35
End: 2025-02-25

## 2025-02-25 NOTE — TELEPHONE ENCOUNTER
This nurse called patient he was supposed to get a copy of his labs to take to work with him and forgot. This nurse asked him if he still needed them he stated yes and is going to call back with a fax number so we can fax them to him.

## 2025-03-24 ENCOUNTER — OFFICE VISIT (OUTPATIENT)
Dept: INTERNAL MEDICINE CLINIC | Age: 35
End: 2025-03-24
Payer: COMMERCIAL

## 2025-03-24 VITALS
HEART RATE: 72 BPM | SYSTOLIC BLOOD PRESSURE: 129 MMHG | BODY MASS INDEX: 21.81 KG/M2 | WEIGHT: 152 LBS | RESPIRATION RATE: 16 BRPM | DIASTOLIC BLOOD PRESSURE: 63 MMHG

## 2025-03-24 DIAGNOSIS — F11.20 SEVERE OPIOID USE DISORDER: Primary | ICD-10-CM

## 2025-03-24 PROCEDURE — 99213 OFFICE O/P EST LOW 20 MIN: CPT | Performed by: NURSE PRACTITIONER

## 2025-03-24 PROCEDURE — 80305 DRUG TEST PRSMV DIR OPT OBS: CPT | Performed by: NURSE PRACTITIONER

## 2025-03-24 RX ORDER — BUPRENORPHINE AND NALOXONE 12; 3 MG/1; MG/1
1 FILM, SOLUBLE BUCCAL; SUBLINGUAL DAILY
Qty: 28 FILM | Refills: 0 | Status: SHIPPED | OUTPATIENT
Start: 2025-03-24 | End: 2025-04-21

## 2025-03-24 NOTE — PROGRESS NOTES
25   The patients primary care physician is Rahda Tovar APRN - ALFRED    Jomar Morel is a 35 y.o.  male who presents in office today for follow up medication assisted treatment, substance use disorder.   Pt was a routine MAT pt of Dr Madison (est 2020)and transferred care to [facility]. Pt required late evening appts so I assmed care in 2023      Pt denies any urges, triggers, or cravings. Presents in office for subcutaneous buprenorphine.   Injectable excluded from plan    UDS acceptable    Plan to order routine labs next visit      Pertinent Drug History  Patient has had problems using heroin/fentanyl, pain pills  Patient started using [id number] years ago  He said his brother had  of a heroin overdose  Patient uses it snorting  Other drugs used: THC  Suboxone programs in the past no  Vivitrol in the past no  Patient would typically use $40-$50 worth of heroin/fentanyl daily     Past Medical History:   Diagnosis Date    Opioid use disorder          Social History     Socioeconomic History    Marital status: Single     Spouse name: Not on file    Number of children: Not on file    Years of education: Not on file    Highest education level: Not on file   Occupational History    Not on file   Tobacco Use    Smoking status: Every Day     Current packs/day: 1.00     Types: Cigarettes    Smokeless tobacco: Never   Substance and Sexual Activity    Alcohol use: Not Currently     Comment: social/occasional    Drug use: Yes     Types: Marijuana (Weed)     Comment: last used fentanyl 20    Sexual activity: Yes     Partners: Female   Other Topics Concern    Not on file   Social History Narrative    Not on file     Social Drivers of Health     Financial Resource Strain: Low Risk  (2024)    Overall Financial Resource Strain (CARDIA)     Difficulty of Paying Living Expenses: Not very hard   Food Insecurity: No Food Insecurity (2024)    Hunger Vital Sign     Worried About Running Out of

## 2025-04-21 ENCOUNTER — OFFICE VISIT (OUTPATIENT)
Dept: INTERNAL MEDICINE CLINIC | Age: 35
End: 2025-04-21
Payer: COMMERCIAL

## 2025-04-21 ENCOUNTER — LAB (OUTPATIENT)
Dept: LAB | Age: 35
End: 2025-04-21

## 2025-04-21 VITALS
DIASTOLIC BLOOD PRESSURE: 64 MMHG | SYSTOLIC BLOOD PRESSURE: 126 MMHG | HEART RATE: 81 BPM | RESPIRATION RATE: 18 BRPM | BODY MASS INDEX: 22.24 KG/M2 | WEIGHT: 155 LBS

## 2025-04-21 DIAGNOSIS — F11.20 SEVERE OPIOID USE DISORDER (HCC): Primary | ICD-10-CM

## 2025-04-21 DIAGNOSIS — F11.20 SEVERE OPIOID USE DISORDER (HCC): ICD-10-CM

## 2025-04-21 LAB
ALBUMIN SERPL BCG-MCNC: 4.1 G/DL (ref 3.4–4.9)
ALCOHOL URINE: ABNORMAL
ALP SERPL-CCNC: 62 U/L (ref 40–129)
ALT SERPL W/O P-5'-P-CCNC: 25 U/L (ref 10–50)
AMPHETAMINE SCREEN URINE: ABNORMAL
ANION GAP SERPL CALC-SCNC: 8 MEQ/L (ref 8–16)
AST SERPL-CCNC: 29 U/L (ref 10–50)
BARBITURATE SCREEN URINE: ABNORMAL
BASOPHILS ABSOLUTE: 0 THOU/MM3 (ref 0–0.1)
BASOPHILS NFR BLD AUTO: 0.8 %
BENZODIAZEPINE SCREEN, URINE: ABNORMAL
BILIRUB SERPL-MCNC: < 0.2 MG/DL (ref 0.3–1.2)
BUN SERPL-MCNC: 15 MG/DL (ref 8–23)
BUPRENORPHINE URINE: ABNORMAL
CALCIUM SERPL-MCNC: 9.3 MG/DL (ref 8.6–10)
CHLORIDE SERPL-SCNC: 102 MEQ/L (ref 98–111)
CO2 SERPL-SCNC: 30 MEQ/L (ref 22–29)
COCAINE METABOLITE SCREEN URINE: ABNORMAL
CREAT SERPL-MCNC: 1.1 MG/DL (ref 0.7–1.2)
DEPRECATED RDW RBC AUTO: 40.7 FL (ref 35–45)
EOSINOPHIL NFR BLD AUTO: 3.6 %
EOSINOPHILS ABSOLUTE: 0.2 THOU/MM3 (ref 0–0.4)
ERYTHROCYTE [DISTWIDTH] IN BLOOD BY AUTOMATED COUNT: 12.3 % (ref 11.5–14.5)
FENTANYL SCREEN, URINE: ABNORMAL
GABAPENTIN SCREEN, URINE: ABNORMAL
GFR SERPL CREATININE-BSD FRML MDRD: 90 ML/MIN/1.73M2
GLUCOSE SERPL-MCNC: 68 MG/DL (ref 74–109)
HCT VFR BLD AUTO: 41.1 % (ref 42–52)
HGB BLD-MCNC: 13.5 GM/DL (ref 14–18)
IMM GRANULOCYTES # BLD AUTO: 0.01 THOU/MM3 (ref 0–0.07)
IMM GRANULOCYTES NFR BLD AUTO: 0.2 %
LYMPHOCYTES ABSOLUTE: 2.1 THOU/MM3 (ref 1–4.8)
LYMPHOCYTES NFR BLD AUTO: 40.3 %
MCH RBC QN AUTO: 29.7 PG (ref 26–33)
MCHC RBC AUTO-ENTMCNC: 32.8 GM/DL (ref 32.2–35.5)
MCV RBC AUTO: 90.3 FL (ref 80–94)
MDMA, URINE: ABNORMAL
METHADONE SCREEN, URINE: ABNORMAL
METHAMPHETAMINE, URINE: ABNORMAL
MONOCYTES ABSOLUTE: 0.3 THOU/MM3 (ref 0.4–1.3)
MONOCYTES NFR BLD AUTO: 6.4 %
NEUTROPHILS ABSOLUTE: 2.6 THOU/MM3 (ref 1.8–7.7)
NEUTROPHILS NFR BLD AUTO: 48.7 %
NRBC BLD AUTO-RTO: 0 /100 WBC
OPIATE SCREEN URINE: ABNORMAL
OXYCODONE SCREEN URINE: ABNORMAL
PHENCYCLIDINE SCREEN URINE: ABNORMAL
PLATELET # BLD AUTO: 107 THOU/MM3 (ref 130–400)
PMV BLD AUTO: 13.6 FL (ref 9.4–12.4)
POTASSIUM SERPL-SCNC: 4.7 MEQ/L (ref 3.5–5.2)
PROPOXYPHENE SCREEN, URINE: ABNORMAL
PROT SERPL-MCNC: 7.1 G/DL (ref 6.4–8.3)
RBC # BLD AUTO: 4.55 MILL/MM3 (ref 4.7–6.1)
SODIUM SERPL-SCNC: 140 MEQ/L (ref 135–145)
SYNTHETIC CANNABINOIDS(K2) SCREEN, URINE: ABNORMAL
THC SCREEN, URINE: ABNORMAL
TRAMADOL SCREEN URINE: ABNORMAL
TRICYCLIC ANTIDEPRESSANTS, UR: ABNORMAL
WBC # BLD AUTO: 5.3 THOU/MM3 (ref 4.8–10.8)

## 2025-04-21 PROCEDURE — 80305 DRUG TEST PRSMV DIR OPT OBS: CPT | Performed by: NURSE PRACTITIONER

## 2025-04-21 PROCEDURE — 99213 OFFICE O/P EST LOW 20 MIN: CPT | Performed by: NURSE PRACTITIONER

## 2025-04-21 RX ORDER — BUPRENORPHINE AND NALOXONE 12; 3 MG/1; MG/1
1 FILM, SOLUBLE BUCCAL; SUBLINGUAL DAILY
Qty: 28 FILM | Refills: 0 | Status: SHIPPED | OUTPATIENT
Start: 2025-04-21 | End: 2025-05-19

## 2025-04-21 NOTE — PROGRESS NOTES
Verbal order per HIRO ALCOCER CNP for urine drug screen. Positive for BUP,THC.    
  Transportation Needs: No Transportation Needs (2/22/2024)    PRAPARE - Transportation     Lack of Transportation (Medical): No     Lack of Transportation (Non-Medical): No   Physical Activity: Inactive (2/22/2024)    Exercise Vital Sign     Days of Exercise per Week: 0 days     Minutes of Exercise per Session: 0 min   Stress: No Stress Concern Present (2/22/2024)    [employer] [place] [employer] - Occupational Stress Questionnaire     Feeling of Stress : Only a little   Social Connections: Socially Isolated (2/22/2024)    Social Connection and Isolation Panel [NHANES]     Frequency of Communication with Friends and Family: Once a week     Frequency of Social Gatherings with Friends and Family: Never     Attends Islam Services: Never     Active Member of Clubs or Organizations: No     Attends Club or Organization Meetings: Never     Marital Status: Living with partner   Intimate Partner Violence: Not At Risk (2/22/2024)    Humiliation, Afraid, Rape, and Kick questionnaire     Fear of Current or Ex-Partner: No     Emotionally Abused: No     Physically Abused: No     Sexually Abused: No   Housing Stability: Unknown (2/22/2024)    Housing Stability Vital Sign     Unable to Pay for Housing in the Last Year: No     Number of Places Lived in the Last Year: Not on file     Unstable Housing in the Last Year: No         Current Outpatient Medications on File Prior to Visit   Medication Sig Dispense Refill    ibuprofen (ADVIL;MOTRIN) 200 MG tablet Take 1 tablet by mouth every 6 hours as needed for Pain (Patient not taking: Reported on 4/21/2025)       No current facility-administered medications on file prior to visit.           Vitals:    04/21/25 1626   BP: 126/64   Pulse: 81   Resp: 18        Cognition: alert, oriented to person, place, and time  Appearance: appropriate, no acute distress, does not appear intoxicated or in withdrawal  Memory: Normal  Behavioral/motor: normal  Affect: congruent  Attitude

## 2025-05-19 ENCOUNTER — OFFICE VISIT (OUTPATIENT)
Dept: INTERNAL MEDICINE CLINIC | Age: 35
End: 2025-05-19

## 2025-05-19 VITALS
RESPIRATION RATE: 16 BRPM | SYSTOLIC BLOOD PRESSURE: 127 MMHG | WEIGHT: 154 LBS | BODY MASS INDEX: 22.1 KG/M2 | DIASTOLIC BLOOD PRESSURE: 77 MMHG | HEART RATE: 56 BPM

## 2025-05-19 DIAGNOSIS — D64.9 ANEMIA, UNSPECIFIED TYPE: ICD-10-CM

## 2025-05-19 DIAGNOSIS — F11.20 SEVERE OPIOID USE DISORDER (HCC): Primary | ICD-10-CM

## 2025-05-19 PROCEDURE — 99214 OFFICE O/P EST MOD 30 MIN: CPT | Performed by: NURSE PRACTITIONER

## 2025-05-19 PROCEDURE — 80305 DRUG TEST PRSMV DIR OPT OBS: CPT | Performed by: NURSE PRACTITIONER

## 2025-05-19 RX ORDER — BUPRENORPHINE AND NALOXONE 12; 3 MG/1; MG/1
1 FILM, SOLUBLE BUCCAL; SUBLINGUAL DAILY
Qty: 28 FILM | Refills: 0 | Status: SHIPPED | OUTPATIENT
Start: 2025-05-19 | End: 2025-06-16

## 2025-05-19 NOTE — PROGRESS NOTES
25   The patients primary care physician is Radha Tovar APRN - ALFRED    Jomar Morel is a 35 y.o.  male who presents in office today for follow up medication assisted treatment, substance use disorder.   Est in this office 2020. I first saw pt in 2023- he has remained compliant with all aspects of care.     Pt denies any urges, triggers, or cravings.     UDS acceptable    Labs reviewed with pt. Recommended a Multivitamin with iron supplement      Pertinent Drug History  Patient has had problems using heroin/fentanyl, pain pills  Patient started using [id number] years ago  He said his brother had  of a heroin overdose  Patient uses it snorting  Other drugs used: THC  Suboxone programs in the past no  Vivitrol in the past no  Patient would typically use $40-$50 worth of heroin/fentanyl amarilis  Past Medical History:   Diagnosis Date    Opioid use disorder          Social History     Socioeconomic History    Marital status: Single     Spouse name: Not on file    Number of children: Not on file    Years of education: Not on file    Highest education level: Not on file   Occupational History    Not on file   Tobacco Use    Smoking status: Every Day     Current packs/day: 1.00     Types: Cigarettes    Smokeless tobacco: Never   Substance and Sexual Activity    Alcohol use: Not Currently     Comment: social/occasional    Drug use: Yes     Types: Marijuana (Weed)     Comment: last used fentanyl 20    Sexual activity: Yes     Partners: Female   Other Topics Concern    Not on file   Social History Narrative    Not on file     Social Drivers of Health     Financial Resource Strain: Low Risk  (2024)    Overall Financial Resource Strain (CARDIA)     Difficulty of Paying Living Expenses: Not very hard   Food Insecurity: No Food Insecurity (2024)    Hunger Vital Sign     Worried About Running Out of Food in the Last Year: Never true     Ran Out of Food in the Last Year: Never true

## 2025-06-16 ENCOUNTER — CLINICAL SUPPORT (OUTPATIENT)
Age: 35
End: 2025-06-16

## 2025-06-16 DIAGNOSIS — F11.20 SEVERE OPIOID USE DISORDER (HCC): Primary | ICD-10-CM

## 2025-06-16 PROCEDURE — 80305 DRUG TEST PRSMV DIR OPT OBS: CPT | Performed by: NURSE PRACTITIONER

## 2025-06-16 RX ORDER — BUPRENORPHINE AND NALOXONE 12; 3 MG/1; MG/1
1 FILM, SOLUBLE BUCCAL; SUBLINGUAL DAILY
Qty: 28 FILM | Refills: 0 | OUTPATIENT
Start: 2025-06-16 | End: 2025-07-14

## 2025-06-16 NOTE — PROGRESS NOTES
Verbal order per HIRO ALCOCER CNP for urine drug screen. Positive for BUP, THC.  Patient was here for a nurse visit per Hiro Alcocer CNP. Medication called into pharmacy.

## 2025-07-14 ENCOUNTER — OFFICE VISIT (OUTPATIENT)
Age: 35
End: 2025-07-14

## 2025-07-14 VITALS
WEIGHT: 157 LBS | HEART RATE: 73 BPM | BODY MASS INDEX: 22.53 KG/M2 | TEMPERATURE: 98.2 F | OXYGEN SATURATION: 97 % | DIASTOLIC BLOOD PRESSURE: 59 MMHG | SYSTOLIC BLOOD PRESSURE: 114 MMHG

## 2025-07-14 DIAGNOSIS — F11.20 SEVERE OPIOID USE DISORDER (HCC): Primary | ICD-10-CM

## 2025-07-14 PROCEDURE — 80305 DRUG TEST PRSMV DIR OPT OBS: CPT | Performed by: NURSE PRACTITIONER

## 2025-07-14 PROCEDURE — 99213 OFFICE O/P EST LOW 20 MIN: CPT | Performed by: NURSE PRACTITIONER

## 2025-07-14 RX ORDER — BUPRENORPHINE AND NALOXONE 12; 3 MG/1; MG/1
1 FILM, SOLUBLE BUCCAL; SUBLINGUAL DAILY
Qty: 28 FILM | Refills: 0 | Status: SHIPPED | OUTPATIENT
Start: 2025-07-14 | End: 2025-08-11

## 2025-07-14 SDOH — ECONOMIC STABILITY: FOOD INSECURITY: WITHIN THE PAST 12 MONTHS, YOU WORRIED THAT YOUR FOOD WOULD RUN OUT BEFORE YOU GOT MONEY TO BUY MORE.: NEVER TRUE

## 2025-07-14 SDOH — ECONOMIC STABILITY: FOOD INSECURITY: WITHIN THE PAST 12 MONTHS, THE FOOD YOU BOUGHT JUST DIDN'T LAST AND YOU DIDN'T HAVE MONEY TO GET MORE.: NEVER TRUE

## 2025-07-14 NOTE — PROGRESS NOTES
25   The patients primary care physician is Radha Tovar, DOMINIC - ALFRED    Jomar Morel is a 35 y.o.  male who presents in office today for follow up medication assisted treatment, substance use disorder.   Est in this office 2020. I first saw pt in 2023- he has remained compliant with all aspects of care.      Pt denies any urges, triggers, or cravings.     UDS acceptable    Denies any concerns r/t to mood or sleep. No additional needs voiced    Pertinent Drug History  Patient has had problems using heroin/fentanyl, pain pills  Patient started using [id number] years ago  He said his brother had  of a heroin overdose  Patient uses it snorting  Other drugs used: THC  Suboxone programs in the past no  Vivitrol in the past no  Patient would typically use $40-$50 worth of heroin/fentanyl amarilis  Past Medical History:   Diagnosis Date    Opioid use disorder          Social History     Socioeconomic History    Marital status: Single     Spouse name: Not on file    Number of children: Not on file    Years of education: Not on file    Highest education level: Not on file   Occupational History    Not on file   Tobacco Use    Smoking status: Every Day     Current packs/day: 1.00     Types: Cigarettes    Smokeless tobacco: Never   Substance and Sexual Activity    Alcohol use: Not Currently     Comment: social/occasional    Drug use: Yes     Types: Marijuana (Weed)     Comment: last used fentanyl 20    Sexual activity: Yes     Partners: Female   Other Topics Concern    Not on file   Social History Narrative    Not on file     Social Drivers of Health     Financial Resource Strain: Low Risk  (2024)    Overall Financial Resource Strain (CARDIA)     Difficulty of Paying Living Expenses: Not very hard   Food Insecurity: No Food Insecurity (2025)    Hunger Vital Sign     Worried About Running Out of Food in the Last Year: Never true     Ran Out of Food in the Last Year: Never true

## 2025-08-11 ENCOUNTER — OFFICE VISIT (OUTPATIENT)
Age: 35
End: 2025-08-11

## 2025-08-11 VITALS
BODY MASS INDEX: 22.1 KG/M2 | HEART RATE: 73 BPM | RESPIRATION RATE: 16 BRPM | WEIGHT: 154 LBS | DIASTOLIC BLOOD PRESSURE: 62 MMHG | SYSTOLIC BLOOD PRESSURE: 126 MMHG

## 2025-08-11 DIAGNOSIS — F11.20 SEVERE OPIOID USE DISORDER (HCC): Primary | ICD-10-CM

## 2025-08-11 PROCEDURE — 80305 DRUG TEST PRSMV DIR OPT OBS: CPT | Performed by: NURSE PRACTITIONER

## 2025-08-11 PROCEDURE — 99214 OFFICE O/P EST MOD 30 MIN: CPT | Performed by: NURSE PRACTITIONER

## 2025-08-11 RX ORDER — BUPRENORPHINE AND NALOXONE 12; 3 MG/1; MG/1
1 FILM, SOLUBLE BUCCAL; SUBLINGUAL DAILY
Qty: 28 FILM | Refills: 0 | Status: SHIPPED | OUTPATIENT
Start: 2025-08-11 | End: 2025-09-08

## (undated) DEVICE — SUTURE VCRL + SZ 0 L27IN ABSRB VLT L26MM UR-6 5/8 CIR VCP603H

## (undated) DEVICE — INSUFFLATION NEEDLE TO ESTABLISH PNEUMOPERITONEUM.: Brand: INSUFFLATION NEEDLE

## (undated) DEVICE — BASIC SINGLE BASIN BTC-LF: Brand: MEDLINE INDUSTRIES, INC.

## (undated) DEVICE — ELECTRO LUBE IS A SINGLE PATIENT USE DEVICE THAT IS INTENDED TO BE USED ON ELECTROSURGICAL ELECTRODES TO REDUCE STICKING.: Brand: KEY SURGICAL ELECTRO LUBE

## (undated) DEVICE — GENERAL LAPAROSCOPY-LF: Brand: MEDLINE INDUSTRIES, INC.

## (undated) DEVICE — GLOVE ORANGE PI 8   MSG9080

## (undated) DEVICE — PENCIL SMK EVAC ALL IN 1 DSGN ENH VISIBILITY IMPROVED AIR

## (undated) DEVICE — 35 ML SYRINGE LUER-LOCK TIP: Brand: MONOJECT

## (undated) DEVICE — TROCAR: Brand: KII FIOS FIRST ENTRY

## (undated) DEVICE — GLOVE ORANGE PI 7   MSG9070

## (undated) DEVICE — PUMP SUC IRR TBNG L10FT W/ HNDPC ASSEMB STRYKEFLOW 2

## (undated) DEVICE — ARM DRAPE

## (undated) DEVICE — COLUMN DRAPE

## (undated) DEVICE — SUTURE VCRL + SZ 0 L18IN ABSRB TIE VCP106G

## (undated) DEVICE — STAPLER ECHELON 3000 45MM LONG

## (undated) DEVICE — TIP COVER ACCESSORY

## (undated) DEVICE — SEAL

## (undated) DEVICE — BANDAGE ADH W1XL3IN NAT FAB WVN FLX DURABLE N ADH PD SEAL

## (undated) DEVICE — BAG SPEC REM 224ML W4XL6IN DIA10MM 1 HND GYN DISP ENDOPCH

## (undated) DEVICE — BLADELESS OBTURATOR: Brand: WECK VISTA

## (undated) DEVICE — GOWN,SIRUS,NON REINFRCD,LARGE,SET IN SL: Brand: MEDLINE

## (undated) DEVICE — TUBING INSUFFLATOR HEAT HUMIDIFIED SMK EVAC SET PNEUMOCLEAR

## (undated) DEVICE — RELOAD STPL L45MM H1-2.6MM MESENTERY THN TISS WHT GRIPPING